# Patient Record
Sex: FEMALE | Race: BLACK OR AFRICAN AMERICAN | Employment: OTHER | URBAN - METROPOLITAN AREA
[De-identification: names, ages, dates, MRNs, and addresses within clinical notes are randomized per-mention and may not be internally consistent; named-entity substitution may affect disease eponyms.]

---

## 2017-02-05 RX ORDER — GLIPIZIDE 10 MG/1
TABLET ORAL
Qty: 60 TAB | Refills: 2 | Status: SHIPPED | OUTPATIENT
Start: 2017-02-05 | End: 2017-02-10 | Stop reason: SDUPTHER

## 2017-02-09 ENCOUNTER — TELEPHONE (OUTPATIENT)
Dept: FAMILY MEDICINE CLINIC | Age: 62
End: 2017-02-09

## 2017-02-09 RX ORDER — METFORMIN HYDROCHLORIDE 500 MG/1
500 TABLET, EXTENDED RELEASE ORAL
Qty: 30 TAB | Refills: 0 | Status: SHIPPED | OUTPATIENT
Start: 2017-02-09 | End: 2017-03-09 | Stop reason: SDUPTHER

## 2017-02-09 NOTE — TELEPHONE ENCOUNTER
Per call from patient, she no longer uses [employer]. Please send medication to [id number] [facility]. I have added new pharmacy to connect care.         Pharmacy   [facility], [address] [place] [address]   Order Audit [place]   Number of times this order has been changed since signin   Order Audit [place]   Order History   Outpatient   Date/Time Action Taken User Additional Information   17 1069 Sign Viet Marroquin MD Reorder from Order: [id number]   Destination   This Medication Went To:   "[employer]INTERFACE [[id number]]   Medication Detail      Disp Refills Start End      glipizide (GLUCOTROL) 10 mg tablet 60 Tab 2 2017     Sig: take 1 tablet by mouth twice a day    E-Prescribing Status: Receipt confirmed by pharmacy (2017  9:42 AM EST)

## 2017-02-09 NOTE — TELEPHONE ENCOUNTER
Refilled metformin XR 500mg daily x 30 days  Pt needs diabetes follow up with lab work to determine her best medication regimen  PSR to please call and schedule pt to see me or Dr. Lisset Prasad within the next month

## 2017-02-10 RX ORDER — GLIPIZIDE 10 MG/1
10 TABLET ORAL 2 TIMES DAILY
Qty: 60 TAB | Refills: 2 | Status: SHIPPED | OUTPATIENT
Start: 2017-02-10 | End: 2017-03-24 | Stop reason: SDUPTHER

## 2017-02-10 NOTE — TELEPHONE ENCOUNTER
glipizide (GLUCOTROL) 10 mg sent to [facility] per patient request.

## 2017-02-28 ENCOUNTER — TELEPHONE (OUTPATIENT)
Dept: FAMILY MEDICINE CLINIC | Age: 62
End: 2017-02-28

## 2017-03-09 ENCOUNTER — OFFICE VISIT (OUTPATIENT)
Dept: FAMILY MEDICINE CLINIC | Age: 62
End: 2017-03-09

## 2017-03-09 VITALS
OXYGEN SATURATION: 100 % | HEIGHT: 63 IN | RESPIRATION RATE: 20 BRPM | DIASTOLIC BLOOD PRESSURE: 70 MMHG | WEIGHT: 232 LBS | HEART RATE: 71 BPM | SYSTOLIC BLOOD PRESSURE: 145 MMHG | BODY MASS INDEX: 41.11 KG/M2 | TEMPERATURE: 98 F

## 2017-03-09 DIAGNOSIS — Z12.11 COLON CANCER SCREENING: ICD-10-CM

## 2017-03-09 DIAGNOSIS — J06.9 UPPER RESPIRATORY TRACT INFECTION, UNSPECIFIED TYPE: Primary | ICD-10-CM

## 2017-03-09 DIAGNOSIS — J45.901 RAD (REACTIVE AIRWAY DISEASE), UNSPECIFIED ASTHMA SEVERITY, WITH ACUTE EXACERBATION: ICD-10-CM

## 2017-03-09 DIAGNOSIS — E11.9 DIABETES MELLITUS WITHOUT COMPLICATION (HCC): ICD-10-CM

## 2017-03-09 LAB — HBA1C MFR BLD HPLC: 8.4 %

## 2017-03-09 RX ORDER — METHYLPREDNISOLONE 4 MG/1
TABLET ORAL
Qty: 1 DOSE PACK | Refills: 0 | Status: SHIPPED | OUTPATIENT
Start: 2017-03-09 | End: 2017-05-15 | Stop reason: SDUPTHER

## 2017-03-09 RX ORDER — ALBUTEROL SULFATE 90 UG/1
2 AEROSOL, METERED RESPIRATORY (INHALATION)
Qty: 1 INHALER | Refills: 3 | Status: SHIPPED | OUTPATIENT
Start: 2017-03-09 | End: 2017-05-15 | Stop reason: SDUPTHER

## 2017-03-09 RX ORDER — METFORMIN HYDROCHLORIDE 500 MG/1
TABLET, EXTENDED RELEASE ORAL
Qty: 30 TAB | Refills: 0 | Status: SHIPPED | OUTPATIENT
Start: 2017-03-09 | End: 2017-04-08 | Stop reason: SDUPTHER

## 2017-03-09 RX ORDER — AZITHROMYCIN 250 MG/1
TABLET, FILM COATED ORAL
Qty: 6 TAB | Refills: 0 | Status: SHIPPED | OUTPATIENT
Start: 2017-03-09 | End: 2017-03-14

## 2017-03-09 NOTE — PATIENT INSTRUCTIONS
Talk with Manohar Peña our nurse navigator about resources    Eye exam soon    Return your stool cards when ready

## 2017-03-09 NOTE — TELEPHONE ENCOUNTER
Refilled metformin x 30 days  PSR to please call pt and help her make DM f/u appt asap - due for labs and possible medication adjustment  Thank you

## 2017-03-09 NOTE — LETTER
3/9/2017 4:00 PM 
 
Ms. Leta Hanson [address] [place] [address] Your diabetes is not optimal.  a1c 8.4 Focus on regular exercise (150 minutes each week) and healthy eating. Eat more fruits and vegetables. Eat more protein (egg whites, beans, and nuts you know you tolerate) and less carbohydrates (white bread, white rice, white pasta, white potatoes, sodas, and sweets). Eat appropriately small portion sizes. See Precious Charles to discuss diet an exercise and talk about resources. Sincerely, Shyla Garcia MD

## 2017-03-09 NOTE — MR AVS SNAPSHOT
Visit Information Date & Time Provider Department Dept. Phone Encounter #  
 3/9/2017  3:15 PM Traci Butler MD [address] [place] [address] [phone] [id number] Upcoming Health Maintenance Date Due  
 EYE EXAM RETINAL OR DILATED Q1 2/17/1965 FOBT Q 1 YEAR AGE 50-75 10/1/2016 HEMOGLOBIN A1C Q6M 2/19/2017 FOOT EXAM Q1 8/19/2017 MICROALBUMIN Q1 8/19/2017 LIPID PANEL Q1 8/19/2017 BREAST CANCER SCRN MAMMOGRAM 10/1/2017 PAP AKA CERVICAL CYTOLOGY 10/1/2018 DTaP/Tdap/Td series (2 - Td) 9/8/2025 Allergies as of 3/9/2017  Review Complete On: 3/9/2017 By: Traci Butler MD  
  
 Severity Noted Reaction Type Reactions Codeine  09/02/2015    Nausea and Vomiting Tramadol  12/30/2015    Nausea and Vomiting Current Immunizations  Reviewed on 3/1/2016 Name Date Influenza Vaccine 9/10/2015 Influenza Vaccine (Quad) PF 11/23/2016 Pneumococcal Polysaccharide (PPSV-23) 8/19/2016 Tdap 9/8/2015  9:07 AM  
  
 Not reviewed this visit You Were Diagnosed With   
  
 Codes Comments Diabetes mellitus without complication ([facility]    -  Primary ICD-10-CM: E11.9 ICD-9-CM: 250.00   
 RAD (reactive airway disease), unspecified asthma severity, with acute exacerbation     ICD-10-CM: J45.901 ICD-9-CM: 895.77 Upper respiratory tract infection, unspecified type     ICD-10-CM: J06.9 ICD-9-CM: 465.9 Vitals BP Pulse Temp Resp Height(growth percentile) Weight(growth percentile) 145/70 (BP 1 Location: Left arm, BP Patient Position: Sitting) 71 98 °F (36.7 °C) (Oral) 20 5' 3\" (1.6 m) 232 lb (105.2 kg) LMP SpO2 BMI OB Status Smoking Status 09/02/2007 100% 41.1 kg/m2 Hysterectomy Never Smoker Vitals History BMI and BSA Data Body Mass Index Body Surface Area  
 41.1 kg/m 2 2.16 m 2 Preferred Pharmacy Pharmacy Name Phone [id number] [place], [address] AT [facility] [phone] Your Updated Medication List  
  
   
This list is accurate as of: 3/9/17  3:45 PM.  Always use your most recent med list.  
  
  
  
  
 albuterol 90 mcg/actuation inhaler Commonly known as:  PROVENTIL HFA, VENTOLIN HFA, PROAIR HFA Take 2 Puffs by inhalation every four (4) hours as needed for Wheezing. azithromycin 250 mg tablet Commonly known as:  Amairani Dawson Take 2 tablets today, then take 1 tablet daily  
  
 beclomethasone 40 mcg/actuation inhaler Commonly known as:  QVAR Take 1 Puff by inhalation two (2) times a day. Blood-Glucose Meter monitoring kit Use prn for glucose checks at variety of times, fasting, 2hr pp, bedtime Diagnosis code: E 11.65  
  
 conjugated estrogens 0.625 mg/gram vaginal cream  
Commonly known as:  PREMARIN Insert 0.5 g into vagina daily. cyclobenzaprine 10 mg tablet Commonly known as:  FLEXERIL Take 0.5 Tabs by mouth three (3) times daily as needed for Muscle Spasm(s). glipiZIDE 10 mg tablet Commonly known as:  Danette Barrier Take 1 Tab by mouth two (2) times a day. glucose blood VI test strips strip Commonly known as:  ONETOUCH ULTRA TEST Test 4 times a day. HYDROcodone-acetaminophen 5-325 mg per tablet Commonly known as:  Amanda Oyster Take 1 Tab by mouth every eight (8) hours as needed for Pain. Max Daily Amount: 3 Tabs. losartan-hydroCHLOROthiazide 100-12.5 mg per tablet Commonly known as:  HYZAAR  
take 1 tablet by mouth once daily  
  
 lovastatin 40 mg tablet Commonly known as:  MEVACOR Take 0.5 Tabs by mouth nightly. metFORMIN  mg tablet Commonly known as:  GLUCOPHAGE XR  
TAKE 1 TAB BY MOUTH DAILY (WITH DINNER). methylPREDNISolone 4 mg tablet Commonly known as:  Acquanetta Kanner Take with food  
  
 montelukast 10 mg tablet Commonly known as:  SINGULAIR Take 1 Tab by mouth daily. naproxen sodium 220 mg tablet Commonly known as:  Reina Aranda  
 Take 1 Tab by mouth two (2) times daily as needed. For pain Prescriptions Printed Refills  
 azithromycin (ZITHROMAX) 250 mg tablet 0 Sig: Take 2 tablets today, then take 1 tablet daily Class: Print  
 methylPREDNISolone (MEDROL DOSEPACK) 4 mg tablet 0 Sig: Take with food Class: Print  
 beclomethasone (QVAR) 40 mcg/actuation inhaler 11 Sig: Take 1 Puff by inhalation two (2) times a day. Class: Print Route: Inhalation  
 albuterol (PROVENTIL HFA, VENTOLIN HFA, PROAIR HFA) 90 mcg/actuation inhaler 3 Sig: Take 2 Puffs by inhalation every four (4) hours as needed for Wheezing. Class: Print Route: Inhalation We Performed the Following AMB POC HEMOGLOBIN A1C [70990 CPT(R)] Patient Instructions Talk with Louisa Glover our nurse navigator about resources Introducing [facility]! Haily Logan introduces [employer] patient portal. Now you can access parts of your medical record, email your doctor's office, and request medication refills online. 1. In your internet browser, go to [email or web address] 2. Click on the First Time User? Click Here link in the Sign In box. You will see the New Member Sign Up page. 3. Enter your [employer] Access Code exactly as it appears below. You will not need to use this code after youve completed the sign-up process. If you do not sign up before the expiration date, you must request a new code. · [employer] Access Code: [email or web address] Expires: 6/7/2017  3:45 PM 
 
4. Enter the last four digits of your Social Security Number (xxxx) and Date of Birth (mm/dd/yyyy) as indicated and click Submit. You will be taken to the next sign-up page. 5. Create a [employer] ID. This will be your [employer] login ID and cannot be changed, so think of one that is secure and easy to remember. 6. Create a [employer] password. You can change your password at any time. 7. Enter your Password Reset Question and Answer. This can be used at a later time if you forget your password. 8. Enter your e-mail address. You will receive e-mail notification when new information is available in [address] 9. Click Sign Up. You can now view and download portions of your medical record. 10. Click the Download Summary menu link to download a portable copy of your medical information. If you have questions, please visit the Frequently Asked Questions section of the [employer] website. Remember, [employer] is NOT to be used for urgent needs. For medical emergencies, dial 911. Now available from your iPhone and Android! Please provide this summary of care documentation to your next provider. Your primary care clinician is listed as Madelaine Goodman. If you have any questions after today's visit, please call [phone].

## 2017-03-09 NOTE — LETTER
3/9/2017 3:47 PM 
 
Ms. Charlie Talbert [address] [address] Body mass index is 41.1 kg/(m^2). Focus on regular exercise (150 minutes each week) and healthy eating. Eat more fruits and vegetables. Eat more protein (egg whites, beans, and nuts you know you tolerate) and less carbohydrates (white bread, white rice, white pasta, white potatoes, sodas, and sweets). Eat appropriately small portion sizes. Sincerely, Tatiana Bloch, MD

## 2017-03-24 ENCOUNTER — TELEPHONE (OUTPATIENT)
Dept: FAMILY MEDICINE CLINIC | Age: 62
End: 2017-03-24

## 2017-03-24 RX ORDER — GLIPIZIDE 10 MG/1
10 TABLET ORAL 2 TIMES DAILY
Qty: 180 TAB | Refills: 0 | Status: SHIPPED | OUTPATIENT
Start: 2017-03-24 | End: 2017-06-21 | Stop reason: SDUPTHER

## 2017-03-24 NOTE — TELEPHONE ENCOUNTER
Per fax from pharmacy, insurance requires the glipizide to be written as a 90-day supply.  Please advise

## 2017-04-08 DIAGNOSIS — E11.9 DIABETES MELLITUS WITHOUT COMPLICATION (HCC): Primary | ICD-10-CM

## 2017-04-08 RX ORDER — METFORMIN HYDROCHLORIDE 500 MG/1
TABLET, EXTENDED RELEASE ORAL
Qty: 180 TAB | Refills: 3 | Status: SHIPPED | OUTPATIENT
Start: 2017-04-08 | End: 2017-08-17 | Stop reason: SDUPTHER

## 2017-04-14 DIAGNOSIS — J40 BRONCHITIS: ICD-10-CM

## 2017-04-14 DIAGNOSIS — J45.40 MODERATE PERSISTENT ASTHMA WITHOUT COMPLICATION: ICD-10-CM

## 2017-04-14 RX ORDER — MONTELUKAST SODIUM 10 MG/1
10 TABLET ORAL DAILY
Qty: 90 TAB | Refills: 3 | Status: SHIPPED | OUTPATIENT
Start: 2017-04-14 | End: 2017-08-17 | Stop reason: SDUPTHER

## 2017-04-14 NOTE — TELEPHONE ENCOUNTER
Jeet Beckett [facility], local pharmacy called for a new refill to be ordered in #90 day as this is the new change with insurance plan.

## 2017-04-27 DIAGNOSIS — J45.901 RAD (REACTIVE AIRWAY DISEASE), UNSPECIFIED ASTHMA SEVERITY, WITH ACUTE EXACERBATION: ICD-10-CM

## 2017-04-27 RX ORDER — METHYLPREDNISOLONE 4 MG/1
TABLET ORAL
Qty: 1 DOSE PACK | Refills: 0 | OUTPATIENT
Start: 2017-04-27

## 2017-04-27 NOTE — TELEPHONE ENCOUNTER
Patient is requesting a refill of the following  Requested Prescriptions     Pending Prescriptions Disp Refills    methylPREDNISolone (MEDROL DOSEPACK) 4 mg tablet 1 Dose Pack 0     Sig: Take with food

## 2017-04-27 NOTE — TELEPHONE ENCOUNTER
----- Message from Saran Fueljl sent at 4/27/2017  8:47 AM EDT -----  Regarding: Dr. Jamilah Vicente  Pt had requested another script for Prednisone to be sent over to [facility] as she is experiencing some wheezing again as her asthma is flairing up. Best contact number is [phone].

## 2017-05-15 ENCOUNTER — OFFICE VISIT (OUTPATIENT)
Dept: FAMILY MEDICINE CLINIC | Age: 62
End: 2017-05-15

## 2017-05-15 VITALS
HEART RATE: 77 BPM | RESPIRATION RATE: 18 BRPM | DIASTOLIC BLOOD PRESSURE: 76 MMHG | SYSTOLIC BLOOD PRESSURE: 123 MMHG | HEIGHT: 63 IN | TEMPERATURE: 98.6 F | OXYGEN SATURATION: 100 % | BODY MASS INDEX: 41.11 KG/M2 | WEIGHT: 232 LBS

## 2017-05-15 DIAGNOSIS — E11.9 DIABETES MELLITUS WITHOUT COMPLICATION (HCC): ICD-10-CM

## 2017-05-15 DIAGNOSIS — Z91.09 ENVIRONMENTAL ALLERGIES: ICD-10-CM

## 2017-05-15 DIAGNOSIS — Z13.31 SCREENING FOR DEPRESSION: ICD-10-CM

## 2017-05-15 DIAGNOSIS — Z59.9 FINANCIAL DIFFICULTIES: ICD-10-CM

## 2017-05-15 DIAGNOSIS — J45.901 RAD (REACTIVE AIRWAY DISEASE), UNSPECIFIED ASTHMA SEVERITY, WITH ACUTE EXACERBATION: Primary | ICD-10-CM

## 2017-05-15 RX ORDER — METHYLPREDNISOLONE ACETATE 40 MG/ML
40 INJECTION, SUSPENSION INTRA-ARTICULAR; INTRALESIONAL; INTRAMUSCULAR; SOFT TISSUE ONCE
Qty: 1 VIAL | Refills: 0
Start: 2017-05-15 | End: 2017-05-15

## 2017-05-15 RX ORDER — METHYLPREDNISOLONE 4 MG/1
TABLET ORAL
Qty: 1 DOSE PACK | Refills: 0 | Status: SHIPPED | OUTPATIENT
Start: 2017-05-15 | End: 2017-08-17 | Stop reason: ALTCHOICE

## 2017-05-15 RX ORDER — ALBUTEROL SULFATE 90 UG/1
2 AEROSOL, METERED RESPIRATORY (INHALATION)
Qty: 1 INHALER | Refills: 3 | Status: SHIPPED | OUTPATIENT
Start: 2017-05-15 | End: 2017-08-17 | Stop reason: SDUPTHER

## 2017-05-15 SDOH — ECONOMIC STABILITY - INCOME SECURITY: PROBLEM RELATED TO HOUSING AND ECONOMIC CIRCUMSTANCES, UNSPECIFIED: Z59.9

## 2017-05-15 NOTE — PROGRESS NOTES
Chief Complaint   Patient presents with    Cough     follow up on cough     Asthma     1. Have you been to the ER, urgent care clinic since your last visit? Hospitalized since your last visit? No    2. Have you seen or consulted any other health care providers outside of the [address] [place] [address] since your last visit? Include any pap smears or colon screening. No     Reviewed record in preparation for visit and have obtained necessary documentation.

## 2017-05-15 NOTE — MR AVS SNAPSHOT
Visit Information Date & Time Provider Department Dept. Phone Encounter #  
 5/15/2017  6:15 PM Satish Sam MD Roxie Fulton [phone] [id number] Upcoming Health Maintenance Date Due  
 EYE EXAM RETINAL OR DILATED Q1 3/9/2018* FOBT Q 1 YEAR AGE 50-75 3/9/2018* INFLUENZA AGE 9 TO ADULT 8/1/2017 FOOT EXAM Q1 8/19/2017 MICROALBUMIN Q1 8/19/2017 LIPID PANEL Q1 8/19/2017 HEMOGLOBIN A1C Q6M 9/9/2017 BREAST CANCER SCRN MAMMOGRAM 10/1/2017 PAP AKA CERVICAL CYTOLOGY 10/1/2018 DTaP/Tdap/Td series (2 - Td) 9/8/2025 *Topic was postponed. The date shown is not the original due date. Allergies as of 5/15/2017  Review Complete On: 5/15/2017 By: Satish Sam MD  
  
 Severity Noted Reaction Type Reactions Codeine  09/02/2015    Nausea and Vomiting Tramadol  12/30/2015    Nausea and Vomiting Current Immunizations  Reviewed on 3/1/2016 Name Date Influenza Vaccine 9/10/2015 Influenza Vaccine (Quad) PF 11/23/2016 Pneumococcal Polysaccharide (PPSV-23) 8/19/2016 Tdap 9/8/2015  9:07 AM  
  
 Not reviewed this visit You Were Diagnosed With   
  
 Codes Comments RAD (reactive airway disease), unspecified asthma severity, with acute exacerbation    -  Primary ICD-10-CM: J45.901 ICD-9-CM: 493.92   
 BMI 40.0-44.9, adult (HCC)     ICD-10-CM: Z68.41 
ICD-9-CM: V85.41 Screening for depression     ICD-10-CM: Z13.89 ICD-9-CM: V79.0 Vitals BP Pulse Temp Resp Height(growth percentile) Weight(growth percentile) 123/76 (BP 1 Location: Left arm, BP Patient Position: Sitting) 77 98.6 °F (37 °C) (Oral) 18 5' 3\" (1.6 m) 232 lb (105.2 kg) LMP SpO2 BMI OB Status Smoking Status 09/02/2007 100% 41.1 kg/m2 Hysterectomy Never Smoker Vitals History BMI and BSA Data Body Mass Index Body Surface Area  
 41.1 kg/m 2 2.16 m 2 Preferred Pharmacy Pharmacy Name Phone [id number], [address] AT [place] [phone] Your Updated Medication List  
  
   
This list is accurate as of: 5/15/17  6:39 PM.  Always use your most recent med list.  
  
  
  
  
 albuterol 90 mcg/actuation inhaler Commonly known as:  PROVENTIL HFA, VENTOLIN HFA, PROAIR HFA Take 2 Puffs by inhalation every four (4) hours as needed for Wheezing. beclomethasone 40 mcg/actuation [employer] Commonly known as:  QVAR Take 1 Puff by inhalation two (2) times a day. Blood-Glucose Meter monitoring kit Use prn for glucose checks at variety of times, fasting, 2hr pp, bedtime Diagnosis code: E 11.65  
  
 conjugated estrogens 0.625 mg/gram vaginal cream  
Commonly known as:  PREMARIN Insert 0.5 g into vagina daily. cyclobenzaprine 10 mg tablet Commonly known as:  FLEXERIL Take 0.5 Tabs by mouth three (3) times daily as needed for Muscle Spasm(s). glipiZIDE 10 mg tablet Commonly known as:  Tita J Luis Take 1 Tab by mouth two (2) times a day. glucose blood VI test strips strip Commonly known as:  ONETOUCH ULTRA TEST Test 4 times a day. HYDROcodone-acetaminophen 5-325 mg per tablet Commonly known as:  Lynnda Levo Take 1 Tab by mouth every eight (8) hours as needed for Pain. Max Daily Amount: 3 Tabs. losartan-hydroCHLOROthiazide 100-12.5 mg per tablet Commonly known as:  HYZAAR  
take 1 tablet by mouth once daily  
  
 lovastatin 40 mg tablet Commonly known as:  MEVACOR Take 0.5 Tabs by mouth nightly. metFORMIN  mg tablet Commonly known as:  GLUCOPHAGE XR Twice daily  
  
 methylPREDNISolone 4 mg tablet Commonly known as:  Rendasruthi Senegal Take with food  
  
 methylPREDNISolone acetate 40 mg/mL injection Commonly known as:  DEPO-MEDROL 1 mL by IntraMUSCular route once for 1 dose. montelukast 10 mg tablet Commonly known as:  SINGULAIR Take 1 Tab by mouth daily. naproxen sodium 220 mg tablet Commonly known as:  Cesar Laud Take 1 Tab by mouth two (2) times daily as needed. For pain Prescriptions Printed Refills  
 albuterol (PROVENTIL HFA, VENTOLIN HFA, PROAIR HFA) 90 mcg/actuation inhaler 3 Sig: Take 2 Puffs by inhalation every four (4) hours as needed for Wheezing. Class: Print Route: Inhalation  
 beclomethasone (QVAR) 40 mcg/actuation aero 11 Sig: Take 1 Puff by inhalation two (2) times a day. Class: Print Route: Inhalation Prescriptions Sent to Pharmacy Refills  
 methylPREDNISolone (MEDROL DOSEPACK) 4 mg tablet 0 Sig: Take with food Class: Normal  
 Pharmacy: [address] #: [phone] We Performed the Following BEHAV ASSMT W/SCORE & DOCD/STAND INSTRUMENT [id number] CPT(R)] METHYLPREDNISOLONE ACETATE INJECTION 40 MG [ HCP] HI THER/PROPH/DIAG INJECTION, SUBCUT/IM [id number] CPT(R)] To-Do List   
 05/15/2017 PFT:  PULMONARY FUNCTION TEST Patient Instructions Talk with nurse navigator Glory Brunner to see if they have resources for your inhalers 
 
obtain pulmonary function testing. Call [phone] to schedule this Take medrol, with food If worse go to [facility]! Dave Newsome introduces [employer] patient portal. Now you can access parts of your medical record, email your doctor's office, and request medication refills online. 1. In your internet browser, go to [email or web address] 2. Click on the First Time User? Click Here link in the Sign In box. You will see the New Member Sign Up page. 3. Enter your [employer] Access Code exactly as it appears below. You will not need to use this code after youve completed the sign-up process. If you do not sign up before the expiration date, you must request a new code.  
 
· [employer] Access Code: [email or web address] 
 Expires: 6/7/2017  4:45 PM 
 
4. Enter the last four digits of your Social Security Number (xxxx) and Date of Birth (mm/dd/yyyy) as indicated and click Submit. You will be taken to the next sign-up page. 5. Create a [employer] ID. This will be your [employer] login ID and cannot be changed, so think of one that is secure and easy to remember. 6. Create a [employer] password. You can change your password at any time. 7. Enter your Password Reset Question and Answer. This can be used at a later time if you forget your password. 8. Enter your e-mail address. You will receive e-mail notification when new information is available in [address] 9. Click Sign Up. You can now view and download portions of your medical record. 10. Click the Download Summary menu link to download a portable copy of your medical information. If you have questions, please visit the Frequently Asked Questions section of the [employer] website. Remember, [employer] is NOT to be used for urgent needs. For medical emergencies, dial 911. Now available from your iPhone and Android! Please provide this summary of care documentation to your next provider. Your primary care clinician is listed as Jesus Gardner. If you have any questions after today's visit, please call [phone].

## 2017-05-15 NOTE — PATIENT INSTRUCTIONS
Talk with nurse navigator Brian Jessica to see if they have resources for your inhalers    obtain pulmonary function testing.   Call [phone] to schedule this    Take medrol, with food    If worse go to ER    Labs soon, see letter

## 2017-05-15 NOTE — LETTER
5/15/2017 6:34 PM 
 
Ms. Michele Harris [address] [address] Body mass index is 41.1 kg/(m^2). Focus on regular exercise (150 minutes each week) and healthy eating. Eat more fruits and vegetables. Eat more protein (egg whites, beans, and nuts you know you tolerate) and less carbohydrates (white bread, white rice, white pasta, white potatoes, sodas, and sweets). Eat appropriately small portion sizes. Sincerely, Guillermo Whittaker MD

## 2017-05-15 NOTE — LETTER
5/15/2017 6:45 PM 
 
Ms. Christine Fields [address] [address] Dear Christine Diamond It is time for an office visit and labs or medication review. Please make an appointment soon to keep up to date on health maintenance issues. Sincerely, Felecia Coronado MD

## 2017-05-15 NOTE — PROGRESS NOTES
Sharron Aldana is a 58 y.o. female      Issues discussed today include:        Signs and symptoms:  asthma  Duration:  weeks  Context:  She could not afford Qvar prevention Rx  Location:  chest  Quality:  wheezing  Severity:  Mild GUEVARA  Timing:  now  Modifying factors:  Steroids helped last time    Data reviewed or ordered today:  PFT    Other problems include:  Patient Active Problem List   Diagnosis Code    [facility] D03.4    Essential hypertension I10    RAD (reactive airway disease) J45.909    Facial droop R29.810    S/P THAO-BSO Z90.710, Z90.722, Z90.79    H/O colonoscopy Z98.890    OA (osteoarthritis) M19.90    Gout M10.9       Medications:  Current Outpatient Prescriptions   Medication Sig Dispense Refill    methylPREDNISolone (MEDROL DOSEPACK) 4 mg tablet Take with food 1 Dose Pack 0    methylPREDNISolone acetate (DEPO-MEDROL) 40 mg/mL injection 1 mL by IntraMUSCular route once for 1 dose. 1 Vial 0    albuterol (PROVENTIL HFA, VENTOLIN HFA, PROAIR HFA) 90 mcg/actuation inhaler Take 2 Puffs by inhalation every four (4) hours as needed for Wheezing. 1 Inhaler 3    beclomethasone (QVAR) 40 mcg/actuation aero Take 1 Puff by inhalation two (2) times a day. 1 Inhaler 11    montelukast (SINGULAIR) 10 mg tablet Take 1 Tab by mouth daily. 90 Tab 3    metFORMIN ER (GLUCOPHAGE XR) 500 mg tablet Twice daily 180 Tab 3    glipiZIDE (GLUCOTROL) 10 mg tablet Take 1 Tab by mouth two (2) times a day. 180 Tab 0    losartan-hydroCHLOROthiazide (HYZAAR) 100-12.5 mg per tablet take 1 tablet by mouth once daily 30 Tab 11    glucose blood VI test strips (ONETOUCH ULTRA TEST) strip Test 4 times a day. 100 Strip 2    Blood-Glucose Meter monitoring kit Use prn for glucose checks at variety of times, fasting, 2hr pp, bedtime Diagnosis code: E 11.65 1 Kit 1    naproxen sodium (ALEVE) 220 mg tablet Take 1 Tab by mouth two (2) times daily as needed.  For pain 1 Tab 0    conjugated estrogens (PREMARIN) 0.625 mg/gram vaginal cream Insert 0.5 g into vagina daily. 45 g 3    cyclobenzaprine (FLEXERIL) 10 mg tablet Take 0.5 Tabs by mouth three (3) times daily as needed for Muscle Spasm(s). 90 Tab 1    lovastatin (MEVACOR) 40 mg tablet Take 0.5 Tabs by mouth nightly. 45 Tab 3    HYDROcodone-acetaminophen (NORCO) 5-325 mg per tablet Take 1 Tab by mouth every eight (8) hours as needed for Pain. Max Daily Amount: 3 Tabs. 20 Tab 0       Allergies: Allergies   Allergen Reactions    Codeine Nausea and Vomiting    Tramadol Nausea and Vomiting       LMP:  Patient's last menstrual period was 09/02/2007. Social History     Social History    Marital status: SINGLE     Spouse name: N/A    Number of children: N/A    Years of education: N/A     Occupational History    Not on file. Social History Main Topics    Smoking status: Never Smoker    Smokeless tobacco: Never Used    Alcohol use No    Drug use: No    Sexual activity: No     Other Topics Concern    Not on file     Social History Narrative         Family History   Problem Relation Age of Onset    Diabetes Father     Diabetes Sister          Meaningful use:  done      ROS:  Headaches:  no  Chest Pain:  no  SOB:  no  Fevers:  no  Falls:  no  anxiety/depression/losing interest in doing things that were previously enjoyed:  no. PHQ2 =   Other significant ROS:  Painful knot in palm of right hand, ? Dupytren's contracture    Patient's last menstrual period was 09/02/2007.     Physical Exam  Visit Vitals    /76 (BP 1 Location: Left arm, BP Patient Position: Sitting)    Pulse 77    Temp 98.6 °F (37 °C) (Oral)    Resp 18    Ht 5' 3\" (1.6 m)    Wt 232 lb (105.2 kg)    LMP 09/02/2007    SpO2 100%    BMI 41.1 kg/m2     BP Readings from Last 3 Encounters:   05/15/17 123/76   03/09/17 145/70   11/23/16 158/84     Constitutional:  Appears well,  No Acute Distress, Vitals noted  Psychiatric:   Affect normal, Alert and cooperative, Oriented to person/place/time    Eyes:   Pupils equally round and reactive, EOMI, conjunctiva clear, eyelids normal  ENT:   External ears and nose normal/lips, teeth=OK/gums normal, TMs and Orophyarynx normal, +facial droop (stable)  Neck:   general inspection and Thyroid normal.  No abnormal cervical or supraclavicular nodes    Lungs:  +wheezing to auscultation, good respiratory effort  Heart: Ausculation normal.  Regular rhythm. No cardiac murmurs. No carotid bruits or palpable thrills  Chest wall normal  Abd:  benign  Extremities:   without edema, good peripheral pulses  Skin:   Warm to palpation, without rashes, bruising, or suspicious lesions           Assessment:    Patient Active Problem List   Diagnosis Code    Diabetes mellitus without complication ([facility] U81.8    Essential hypertension I10    RAD (reactive airway disease) J45.909    Facial droop R29.810    S/P THAO-BSO Z90.710, Z90.722, Z90.79    H/O colonoscopy Z98.890    OA (osteoarthritis) M19.90    Gout M10.9       Today's diagnoses are:    ICD-10-CM ICD-9-CM    1. RAD (reactive airway disease), unspecified asthma severity, with acute exacerbation J45.901 493.92 PULMONARY FUNCTION TEST      methylPREDNISolone (MEDROL DOSEPACK) 4 mg tablet      METHYLPREDNISOLONE ACETATE INJECTION 40 MG      TX THER/PROPH/DIAG INJECTION, SUBCUT/IM      methylPREDNISolone acetate (DEPO-MEDROL) 40 mg/mL injection      albuterol (PROVENTIL HFA, VENTOLIN HFA, PROAIR HFA) 90 mcg/actuation inhaler      beclomethasone (QVAR) 40 mcg/actuation aero   2. BMI 40.0-44.9, adult ([facility] Z68.41 V85.41    3. Screening for depression Z13.89 V79.0 BEHAV ASSMT W/SCORE & DOCD/STAND INSTRUMENT   4.  Financial difficulties Z59.8 V60.2        Plan:  Orders Placed This Encounter    BEHAV ASSMT W/SCORE & DOCD/STAND INSTRUMENT    PULMONARY FUNCTION TEST     Standing Status:   Future     Standing Expiration Date:   11/15/2017    METHYLPREDNISOLONE ACETATE INJECTION 40 MG    TX THER/PROPH/DIAG INJECTION, SUBCUT/IM    methylPREDNISolone (MEDROL DOSEPACK) 4 mg tablet     Sig: Take with food     Dispense:  1 Dose Pack     Refill:  0    methylPREDNISolone acetate (DEPO-MEDROL) 40 mg/mL injection     Si mL by IntraMUSCular route once for 1 dose. Dispense:  1 Vial     Refill:  0    albuterol (PROVENTIL HFA, VENTOLIN HFA, PROAIR HFA) 90 mcg/actuation inhaler     Sig: Take 2 Puffs by inhalation every four (4) hours as needed for Wheezing. Dispense:  1 Inhaler     Refill:  3    beclomethasone (QVAR) 40 mcg/actuation aero     Sig: Take 1 Puff by inhalation two (2) times a day. Dispense:  1 Inhaler     Refill:  11       See patient instructions  Patient Instructions   Talk with nurse navigator Leta Viramontes to see if they have resources for your inhalers    obtain pulmonary function testing. Call [phone] to schedule this    Take medrol, with food    If worse go to ER        refresh note:  done    AVS Printed:  done    I have reviewed/discussed the above normal BMI with the patient. I have recommended the following interventions: encourage exercise . Tom Box

## 2017-08-17 ENCOUNTER — OFFICE VISIT (OUTPATIENT)
Dept: FAMILY MEDICINE CLINIC | Age: 62
End: 2017-08-17

## 2017-08-17 VITALS
HEART RATE: 66 BPM | SYSTOLIC BLOOD PRESSURE: 133 MMHG | WEIGHT: 236 LBS | RESPIRATION RATE: 20 BRPM | HEIGHT: 63 IN | TEMPERATURE: 98.3 F | OXYGEN SATURATION: 100 % | DIASTOLIC BLOOD PRESSURE: 72 MMHG | BODY MASS INDEX: 41.82 KG/M2

## 2017-08-17 DIAGNOSIS — J45.901 RAD (REACTIVE AIRWAY DISEASE), UNSPECIFIED ASTHMA SEVERITY, WITH ACUTE EXACERBATION: Primary | ICD-10-CM

## 2017-08-17 DIAGNOSIS — J45.40 MODERATE PERSISTENT ASTHMA WITHOUT COMPLICATION: ICD-10-CM

## 2017-08-17 DIAGNOSIS — Z23 NEED FOR ZOSTAVAX ADMINISTRATION: ICD-10-CM

## 2017-08-17 DIAGNOSIS — J40 BRONCHITIS: ICD-10-CM

## 2017-08-17 DIAGNOSIS — M54.9 BACK PAIN, UNSPECIFIED BACK LOCATION, UNSPECIFIED BACK PAIN LATERALITY, UNSPECIFIED CHRONICITY: ICD-10-CM

## 2017-08-17 DIAGNOSIS — E11.9 DIABETES MELLITUS WITHOUT COMPLICATION (HCC): ICD-10-CM

## 2017-08-17 DIAGNOSIS — M19.90 OSTEOARTHRITIS, UNSPECIFIED OSTEOARTHRITIS TYPE, UNSPECIFIED SITE: ICD-10-CM

## 2017-08-17 DIAGNOSIS — Z13.31 SCREENING FOR DEPRESSION: ICD-10-CM

## 2017-08-17 DIAGNOSIS — I10 ESSENTIAL HYPERTENSION: ICD-10-CM

## 2017-08-17 DIAGNOSIS — Z12.31 ENCOUNTER FOR SCREENING MAMMOGRAM FOR BREAST CANCER: ICD-10-CM

## 2017-08-17 LAB
ALBUMIN UR QL STRIP: 10 MG/L
CREATININE, URINE POC: 100 MG/DL
MICROALBUMIN/CREAT RATIO POC: <30 MG/G

## 2017-08-17 RX ORDER — MONTELUKAST SODIUM 10 MG/1
10 TABLET ORAL DAILY
Qty: 90 TAB | Refills: 3 | Status: SHIPPED | OUTPATIENT
Start: 2017-08-17 | End: 2018-11-27 | Stop reason: SDUPTHER

## 2017-08-17 RX ORDER — LOSARTAN POTASSIUM AND HYDROCHLOROTHIAZIDE 12.5; 1 MG/1; MG/1
TABLET ORAL
Qty: 90 TAB | Refills: 3 | Status: SHIPPED | OUTPATIENT
Start: 2017-08-17 | End: 2018-03-27 | Stop reason: ALTCHOICE

## 2017-08-17 RX ORDER — LOVASTATIN 40 MG/1
40 TABLET ORAL
Qty: 90 TAB | Refills: 3 | Status: SHIPPED | OUTPATIENT
Start: 2017-08-17 | End: 2018-08-18 | Stop reason: SDUPTHER

## 2017-08-17 RX ORDER — TRAMADOL HYDROCHLORIDE 50 MG/1
50 TABLET ORAL
Qty: 45 TAB | Refills: 0 | Status: SHIPPED | OUTPATIENT
Start: 2017-08-17 | End: 2018-03-27 | Stop reason: ALTCHOICE

## 2017-08-17 RX ORDER — GLIPIZIDE 10 MG/1
TABLET ORAL
Qty: 180 TAB | Refills: 3 | Status: SHIPPED | OUTPATIENT
Start: 2017-08-17 | End: 2018-08-18 | Stop reason: SDUPTHER

## 2017-08-17 RX ORDER — METFORMIN HYDROCHLORIDE 500 MG/1
TABLET, EXTENDED RELEASE ORAL
Qty: 180 TAB | Refills: 3 | Status: SHIPPED | OUTPATIENT
Start: 2017-08-17 | End: 2018-03-27 | Stop reason: ALTCHOICE

## 2017-08-17 RX ORDER — FLUTICASONE FUROATE AND VILANTEROL 100; 25 UG/1; UG/1
1 POWDER RESPIRATORY (INHALATION) DAILY
Qty: 1 INHALER | Refills: 11 | Status: SHIPPED | OUTPATIENT
Start: 2017-08-17 | End: 2017-08-24 | Stop reason: SDUPTHER

## 2017-08-17 RX ORDER — ALBUTEROL SULFATE 90 UG/1
2 AEROSOL, METERED RESPIRATORY (INHALATION)
Qty: 1 INHALER | Refills: 3 | Status: SHIPPED | OUTPATIENT
Start: 2017-08-17 | End: 2018-11-27 | Stop reason: SDUPTHER

## 2017-08-17 NOTE — LETTER
8/17/2017 10:31 AM 
 
Ms. Yakelin Gomez [address] [address] Body mass index is 41.81 kg/(m^2). Focus on regular exercise (150 minutes each week) and healthy eating. Eat more fruits and vegetables. Eat more protein (egg whites, beans, and nuts you know you tolerate) and less carbohydrates (white bread, white rice, white pasta, white potatoes, sodas, and sweets). Eat appropriately small portion sizes. Flu vaccine around Halloween Yearly eye exam soon Mammogram soon Please call [employer] to help arrange and authorize any tests and/or referrals. Her # is [phone] Central Scheduling at [facility] is #[phone] Sincerely, Elaine Roberts MD

## 2017-08-17 NOTE — PATIENT INSTRUCTIONS
Consider the following health maintenance suggestions:    Wellness Exam every  FEbruary (your birth month)    consider obtaining zostavax (the shingles vaccine) and PCV 13 (the new pneumonia vaccine). You may be given a prescription to obtain this at a local pharmacy. Ask about your insurance coverage of these vaccines     return the stool cards that you were given. Follow the instructions on the cards. This is for colon cancer screening    consider a yearly eye exam including a screen for glaucoma with a local eye doctor    consider obtaining a mammogram    obtain a flu shot each year in the Fall    consider obtaining a colonoscopy for colon cancer screening  Dr. Velazco Handing #[phone]    Please call Shedrick Meigs to help arrange and authorize any tests and/or referrals.   Her # is [phone]     Central Scheduling at [facility] is #[phone]    Start Breo for prevention of wheezing

## 2017-08-17 NOTE — MR AVS SNAPSHOT
Visit Information Date & Time Provider Department Dept. Phone Encounter #  
 8/17/2017  9:45 AM Jaron Balbuena MD [facility] [phone] [id number] Follow-up Instructions Return in about 6 months (around 2/17/2018). Upcoming Health Maintenance Date Due INFLUENZA AGE 9 TO ADULT 10/31/2017* EYE EXAM RETINAL OR DILATED Q1 3/9/2018* FOBT Q 1 YEAR AGE 50-75 3/9/2018* HEMOGLOBIN A1C Q6M 2/17/2018 FOOT EXAM Q1 8/17/2018 MICROALBUMIN Q1 8/17/2018 LIPID PANEL Q1 8/17/2018 PAP AKA CERVICAL CYTOLOGY 10/1/2018 BREAST CANCER SCRN MAMMOGRAM 8/17/2019 DTaP/Tdap/Td series (2 - Td) 9/8/2025 *Topic was postponed. The date shown is not the original due date. Allergies as of 8/17/2017  Review Complete On: 8/17/2017 By: Jaron Balbuena MD  
  
 Severity Noted Reaction Type Reactions Codeine  09/02/2015    Nausea and Vomiting Tramadol  12/30/2015    Nausea and Vomiting Current Immunizations  Reviewed on 3/1/2016 Name Date Influenza Vaccine 9/10/2015 Influenza Vaccine (Quad) PF 11/23/2016 Pneumococcal Polysaccharide (PPSV-23) 8/19/2016 Tdap 9/8/2015  9:07 AM  
  
 Not reviewed this visit You Were Diagnosed With   
  
 Codes Comments RAD (reactive airway disease), unspecified asthma severity, with acute exacerbation    -  Primary ICD-10-CM: J45.901 ICD-9-CM: 017.94 Diabetes mellitus without complication ([facility]     XZH-71-PF: E11.9 ICD-9-CM: 250.00 Osteoarthritis, unspecified osteoarthritis type, unspecified site     ICD-10-CM: M19.90 ICD-9-CM: 715.90 Back pain, unspecified back location, unspecified back pain laterality, unspecified chronicity     ICD-10-CM: M54.9 ICD-9-CM: 724.5 Encounter for screening mammogram for breast cancer     ICD-10-CM: Z12.31 
ICD-9-CM: V76.12 Need for Zostavax administration     ICD-10-CM: Y75 ICD-9-CM: V04.89 Rx for PCV 13 and shingles vaccine givne to obtain at a local pharmcy Essential hypertension     ICD-10-CM: I10 
ICD-9-CM: 401.9 Screening for depression     ICD-10-CM: Z13.89 ICD-9-CM: V79.0 Moderate persistent asthma without complication     [facility]-GR: J45.40 ICD-9-CM: 493.90 Bronchitis     ICD-10-CM: J40 ICD-9-CM: 917 Vitals BP Pulse Temp Resp Height(growth percentile) Weight(growth percentile) 133/72 (BP 1 Location: Left arm, BP Patient Position: Sitting) 66 98.3 °F (36.8 °C) (Oral) 20 5' 3\" (1.6 m) 236 lb (107 kg) LMP SpO2 BMI OB Status Smoking Status 09/02/2007 100% 41.81 kg/m2 Hysterectomy Never Smoker Vitals History BMI and BSA Data Body Mass Index Body Surface Area  
 41.81 kg/m 2 2.18 m 2 Preferred Pharmacy Pharmacy Name Phone [id number] [address] [facility] [phone] Your Updated Medication List  
  
   
This list is accurate as of: 8/17/17 10:45 AM.  Always use your most recent med list.  
  
  
  
  
 albuterol 90 mcg/actuation inhaler Commonly known as:  PROVENTIL HFA, VENTOLIN HFA, PROAIR HFA Take 2 Puffs by inhalation every four (4) hours as needed for Wheezing. Blood-Glucose Meter monitoring kit Use prn for glucose checks at variety of times, fasting, 2hr pp, bedtime Diagnosis code: E 11.65  
  
 fluticasone-vilanterol 100-25 mcg/dose inhaler Commonly known as:  BREO ELLIPTA Take 1 Puff by inhalation daily. glipiZIDE 10 mg tablet Commonly known as:  GLUCOTROL  
TAKE 1 TAB BY MOUTH TWO (2) TIMES A DAY. glucose blood VI test strips strip Commonly known as:  ONETOUCH ULTRA TEST Test 4 times a day. losartan-hydroCHLOROthiazide 100-12.5 mg per tablet Commonly known as:  HYZAAR  
take 1 tablet by mouth once daily  
  
 lovastatin 40 mg tablet Commonly known as:  MEVACOR Take 1 Tab by mouth nightly. metFORMIN  mg tablet Commonly known as:  GLUCOPHAGE XR Twice daily montelukast 10 mg tablet Commonly known as:  SINGULAIR Take 1 Tab by mouth daily. Prescriptions Printed Refills  
 fluticasone-vilanterol (BREO ELLIPTA) 100-25 mcg/dose inhaler 11 Sig: Take 1 Puff by inhalation daily. Class: Print Route: Inhalation Prescriptions Sent to Pharmacy Refills  
 lovastatin (MEVACOR) 40 mg tablet 3 Sig: Take 1 Tab by mouth nightly. Class: Normal  
 Pharmacy: [address] [place] [address] Ph #: [phone] Route: Oral  
 losartan-hydroCHLOROthiazide (HYZAAR) 100-12.5 mg per tablet 3 Sig: take 1 tablet by mouth once daily Class: Normal  
 Pharmacy: [address] [place] [address] Ph #: [phone]  
 glipiZIDE (GLUCOTROL) 10 mg tablet 3 Sig: TAKE 1 TAB BY MOUTH TWO (2) TIMES A DAY. Class: Normal  
 Pharmacy: [facility]/pharmacy [facility] [place] [address] Ph #: [phone]  
 albuterol (PROVENTIL HFA, VENTOLIN HFA, PROAIR HFA) 90 mcg/actuation inhaler 3 Sig: Take 2 Puffs by inhalation every four (4) hours as needed for Wheezing. Class: Normal  
 Pharmacy: [address] [place] [address] Ph #: [phone] Route: Inhalation  
 montelukast (SINGULAIR) 10 mg tablet 3 Sig: Take 1 Tab by mouth daily. Class: Normal  
 Pharmacy: [address] [place] [address] Ph #: [phone] Route: Oral  
 metFORMIN ER (GLUCOPHAGE XR) 500 mg tablet 3 Sig: Twice daily Class: Normal  
 Pharmacy: [address] [place] [address] Ph #: [phone] We Performed the Following ALT [id number] CPT(R)] AMB POC EKG ROUTINE W/ 12 LEADS, INTER & REP [19525 CPT(R)] AMB POC URINE, MICROALBUMIN, SEMIQUANT (3 RESULTS) [82360 CPT(R)] CHOLESTEROL, TOTAL [03287 CPT(R)] HDL CHOLESTEROL [13924 CPT(R)] HEMOGLOBIN A1C WITH EAG [90400 CPT(R)] LDL, DIRECT [id number] CPT(R)] METABOLIC PANEL, BASIC [98657 CPT(R)] [id number] [place] [employer] [ [facility]] Follow-up Instructions Return in about 6 months (around 2/17/2018). To-Do List   
 08/17/2017 Imaging:  MICHELLE MAMMO BI SCREENING INCL CAD   
  
 08/17/2017 Imaging:  XR CHEST PA LAT   
  
 08/17/2017 Imaging:  XR SPINE LUMB 2 OR 3 V Patient Instructions Consider the following health maintenance suggestions: 
 
Wellness Exam every  FEbruary (your birth month) 
 
consider obtaining zostavax (the shingles vaccine) and PCV 13 (the new pneumonia vaccine). You may be given a prescription to obtain this at a local pharmacy. Ask about your insurance coverage of these vaccines  
 
return the stool cards that you were given. Follow the instructions on the cards. This is for colon cancer screening 
 
consider a yearly eye exam including a screen for glaucoma with a local eye doctor 
 
consider obtaining a mammogram 
 
obtain a flu shot each year in the Fall 
 
consider obtaining a colonoscopy for colon cancer screening  Dr. Erinn Guzman #[phone] Please call Angelito Shaw to help arrange and authorize any tests and/or referrals. Her # is [phone] Central Scheduling at [facility] is [phone] Start Breo for prevention of wheezing Introducing [facility]! [facility] introduces [employer] patient portal. Now you can access parts of your medical record, email your doctor's office, and request medication refills online. 1. In your internet browser, go to [email or web address] 2. Click on the First Time User? Click Here link in the Sign In box. You will see the New Member Sign Up page. 3. Enter your [employer] Access Code exactly as it appears below. You will not need to use this code after youve completed the sign-up process.  If you do not sign up before the expiration date, you must request a new code. · [employer] Access Code: [email or web address] Expires: 11/15/2017 10:45 AM 
 
4. Enter the last four digits of your Social Security Number (xxxx) and Date of Birth (mm/dd/yyyy) as indicated and click Submit. You will be taken to the next sign-up page. 5. Create a [employer] ID. This will be your [employer] login ID and cannot be changed, so think of one that is secure and easy to remember. 6. Create a [employer] password. You can change your password at any time. 7. Enter your Password Reset Question and Answer. This can be used at a later time if you forget your password. 8. Enter your e-mail address. You will receive e-mail notification when new information is available in [address] 9. Click Sign Up. You can now view and download portions of your medical record. 10. Click the Download Summary menu link to download a portable copy of your medical information. If you have questions, please visit the Frequently Asked Questions section of the [employer] website. Remember, [employer] is NOT to be used for urgent needs. For medical emergencies, dial 911. Now available from your iPhone and Android! Please provide this summary of care documentation to your next provider. Your primary care clinician is listed as Aryan Bergeron. If you have any questions after today's visit, please call [phone].

## 2017-08-17 NOTE — LETTER
8/17/2017 5:21 PM 
 
Ms. Melo Bustard [address] [address] Dear Lorie Busta: 
 
 
 
 
Please find your most recent results below. RECOMMENDATIONS: 
 
Your EKG was normal.  Congratulations Sincerely, Vidhya Ghosh MD

## 2017-08-17 NOTE — LETTER
8/21/2017 3:02 PM 
 
Ms. Alexis Lacy [address] [place] [address] Dear Alexis Lacy: 
 
Please find your most recent results below. Resulted Orders AMB POC URINE, MICROALBUMIN, SEMIQUANT (3 RESULTS) Result Value Ref Range ALBUMIN, URINE POC 10 Negative mg/L  
 CREATININE, URINE  mg/dL Microalbumin/creat ratio (POC) <30 MG/G Comment:  
   Normal  
CHOLESTEROL, TOTAL Result Value Ref Range Cholesterol, total 184 100 - 199 mg/dL Narrative Performed at:  [address] [place] [address]  [id number] : Joseph Perez MD, Phone:  [phone] LDL, DIRECT Result Value Ref Range LDL,Direct 88 0 - 99 mg/dL Narrative Performed at:  [address] [place] [address]  [id number] : Joseph Perez MD, Phone:  [phone] HDL CHOLESTEROL Result Value Ref Range HDL Cholesterol 103 >39 mg/dL Narrative Performed at:  [address] [place] [address]  [id number] : Joseph Perez MD, Phone:  [phone] HEMOGLOBIN A1C WITH EAG Result Value Ref Range Hemoglobin A1c 9.0 (H) 4.8 - 5.6 % Comment:  
            Pre-diabetes: 5.7 - 6.4 Diabetes: >6.4 Glycemic control for adults with diabetes: <7.0 Estimated average glucose 212 mg/dL Narrative Performed at:  [address] [place] [address]  [id number] : Joseph Perez MD, Phone:  [phone] METABOLIC PANEL, BASIC Result Value Ref Range Glucose 139 (H) 65 - 99 mg/dL BUN 15 8 - 27 mg/dL Creatinine 0.79 0.57 - 1.00 mg/dL GFR est non-AA 80 >59 mL/min/1.73 GFR est AA 93 >59 mL/min/1.73  
 BUN/Creatinine ratio 19 12 - 28 Sodium 141 134 - 144 mmol/L Potassium 4.2 3.5 - 5.2 mmol/L Chloride 99 96 - 106 mmol/L  
 CO2 27 18 - 29 mmol/L Calcium 9.4 8.7 - 10.3 mg/dL Narrative Performed at:  [address]  [id number] : Giselle Reeves MD, Phone:  [phone] ALT Result Value Ref Range ALT (SGPT) 12 0 - 32 IU/L Narrative Performed at:  [address]  [id number] : Giselle Reeves MD, Phone:  [phone] CVD REPORT Result Value Ref Range INTERPRETATION Note Comment:  
   Supplement report is available. Narrative Performed at:  [address] [place] [address] [address] [place] [address]  [id number] : Celena Fabian PhD, Phone:  [phone] DIABETES PATIENT EDUCATION Result Value Ref Range PDF Image Not applicable Narrative Performed at:  [address] [place] [address] [address] [place] [address]  [id number] : Celena Fabian PhD, Phone:  [phone] RECOMMENDATIONS: 
 
Your diabetes control is a bit worse than before. Focus on regular exercise (150 minutes each week) and healthy eating.  Eat more fruits and vegetables.  Eat more protein (egg whites, beans, and nuts you know you tolerate) and less carbohydrates (white bread, white rice, white pasta, white potatoes, sodas, and sweets).  Eat appropriately small portion sizes. Consider adding Januvia 25 mg daily Sincerely, Mohinder Briggs MD

## 2017-08-17 NOTE — PROGRESS NOTES
Marina Yanez is a 58 y.o. female      Issues discussed today include:        Signs and symptoms:  Pain in buttocks going into both posterior thighs  Duration:  weeks  Context:  No known injury  Location:  Low back  Quality:  aches  Severity:  Impacting work  Timing:  Wax wanes  Modifying factors:  tramadol    Data reviewed or ordered today:  XR, mamm, labs, FIT      We discussed health maintenance/diet/exercise/weight loss    WELLNESS     GYN:  THAO/BSO  Mammogram:  Needs 2017  LMP:  2007  last pap:  No longer  Hearing screen:   done  Vision screening:   done  Depression screening:   done  Fall assessment:   done    BMI: Body mass index is 41.81 kg/(m^2). I have reviewed/discussed the above normal BMI with the patient. I have recommended the following interventions: encourage exercise . Nikhil Montoya Colonoscopy:  Needs 2017  FOBT:  2017  TDAP:  2015  Pneumovax:  2016  PCV-13:  Rx 2017  Flu shot:  Needs 2017  Zostavax:  Rx 2017  Eye exam:  Needs 2017  EKG:  today    FTF for DME:  done:  DM supplies  Advanced directive:  Full code  Specialists:  [facility]    In general, I advise patients to be as active as possible. I believe exercise is the key to long life and good health. The current recommendation is for individuals to exercise for 150 minutes each week (in other words 30 minutes 5 days a week). Exercise should be vigorous enough to work up a sweat. These activities include brisk walking, running, tennis, swimming, weight-lifting, etc.     I usually tell folks that work is work and exercise is exercise. Each of these activities has a different goal.  Even though you may be active at work, it may not be aerobically adequate. So build dedicated exercise time into your weekly routine. If a patient drinks alcohol, I suggest that a male drink only 2 beers (or glasses of wine, or shots of liquor) in any 24 hour period ( and not daily).   For females, the limits are one drink per 24 hours (and not daily). After these limits, the toxic effects of alcohol consumption start to manifest.     Avoid tobacco products. I may provide separate information discussing specific smoking cessation instructions if needed. I suggest a wellness exam yearly during your birth month to update health maintenance issues such as fasting labs, EKGs and other studies, appropriate cancer screenings,  immunizations, etc.      I suggest a yearly flu shot    Please call Sindy Krishna to help arrange and authorize any tests or referrals. Her # is [phone]           Other problems include:  Patient Active Problem List   Diagnosis Code    Diabetes mellitus without complication ([facility] [address] I96.7    Essential hypertension I10    RAD (reactive airway disease) J45.909    Facial droop R29.810    S/P THAO-BSO Z90.710, Z90.722, Z90.79    H/O colonoscopy Z98.890    OA (osteoarthritis) M19.90    Gout M10.9       Medications:  Current Outpatient Prescriptions   Medication Sig Dispense Refill    fluticasone-vilanterol (BREO ELLIPTA) 100-25 mcg/dose inhaler Take 1 Puff by inhalation daily. 1 Inhaler 11    glipiZIDE (GLUCOTROL) 10 mg tablet TAKE 1 TAB BY MOUTH TWO (2) TIMES A DAY. 180 Tab 0    albuterol (PROVENTIL HFA, VENTOLIN HFA, PROAIR HFA) 90 mcg/actuation inhaler Take 2 Puffs by inhalation every four (4) hours as needed for Wheezing. 1 Inhaler 3    montelukast (SINGULAIR) 10 mg tablet Take 1 Tab by mouth daily. 90 Tab 3    metFORMIN ER (GLUCOPHAGE XR) 500 mg tablet Twice daily 180 Tab 3    losartan-hydroCHLOROthiazide (HYZAAR) 100-12.5 mg per tablet take 1 tablet by mouth once daily 30 Tab 11    glucose blood VI test strips (ONETOUCH ULTRA TEST) strip Test 4 times a day. 100 Strip 2    Blood-Glucose Meter monitoring kit Use prn for glucose checks at variety of times, fasting, 2hr pp, bedtime Diagnosis code: E 11.65 1 Kit 1    lovastatin (MEVACOR) 40 mg tablet Take 0.5 Tabs by mouth nightly. 45 Tab 3       Allergies:   Allergies   Allergen Reactions    Codeine Nausea and Vomiting    Tramadol Nausea and Vomiting       LMP:  Patient's last menstrual period was 09/02/2007. Social History     Social History    Marital status: SINGLE     Spouse name: N/A    Number of children: N/A    Years of education: N/A     Occupational History    Not on file. Social History Main Topics    Smoking status: Never Smoker    Smokeless tobacco: Never Used    Alcohol use No    Drug use: No    Sexual activity: No     Other Topics Concern    Not on file     Social History Narrative         Family History   Problem Relation Age of Onset    Diabetes Father     Diabetes Sister          Meaningful use:  done      ROS:  Headaches:  no  Chest Pain:  no  SOB:  no  Fevers:  no  Falls:  no  anxiety/depression/losing interest in doing things that were previously enjoyed:  no. PHQ2 = 0  Other significant ROS:  Wheezing (she could not affor QVAR)  Patient denied problems with:    Hearing/vision, speaking/swallowing, Reflux/indigestion, Cough,Diarrhea/constipation,Problems passing or controlling urine,  Mood (anxiety/depression/losing interest in doing things that were previously enjoyed), Snoring/sleep apnea,Fatigue, Weight change, memory                                                         Any other Positive ROS include: she is active at work        Falls in the past 12 months:  no           Over the last year (or since your last visit):  Have you been diagnosed with heart attack, stroke, broken bones, or skin cancer = no    Smoking history:  none                                    Patient's last menstrual period was 09/02/2007.     Physical Exam  Visit Vitals    /72 (BP 1 Location: Left arm, BP Patient Position: Sitting)    Pulse 66    Temp 98.3 °F (36.8 °C) (Oral)    Resp 20    Ht 5' 3\" (1.6 m)    Wt 236 lb (107 kg)    LMP 09/02/2007    SpO2 100%    BMI 41.81 kg/m2     BP Readings from Last 3 Encounters:   08/17/17 133/72   05/15/17 123/76 03/09/17 145/70     Constitutional:  Appears well,  No Acute Distress, Vitals noted  Psychiatric:   Affect normal, Alert and cooperative, Oriented to person/place/time    Eyes:   Pupils equally round and reactive, EOMI, conjunctiva clear, eyelids normal  ENT:   External ears and nose normal/lips, teeth=OK/gums normal, TMs and Orophyarynx normal  Neck:   general inspection and Thyroid normal.  No abnormal cervical or supraclavicular nodes    Lungs:  Some wheezing to auscultation, good respiratory effort  Heart: Ausculation normal.  Regular rhythm. No cardiac murmurs. No carotid bruits or palpable thrills  Chest wall normal  Abd:  benign  Extremities:   without edema, good peripheral pulses  Skin:   Warm to palpation, without rashes, bruising, or suspicious lesions   Diabetic foot exam:      Sensation by vibration sense:  good  Monofilament test:  good  Skin integrity:  intact without lesions  Vascular:  good circulation  Motor:  moves all toes, strength seems ok    +onychomycosis    no tenderness over C-spine, T-spine, L-spine    Good flexibility of spine demonstrated     strength of left leg and right leg seem normal    squats OK, heel standing/toe standing OK    no tenderness over right or left SI joint    no tenderness over left or right trochanteric bursa     straight leg raising is normal for right and left leg    full range of motion at both hips without pain    abdominal exam shows no bruits or masses          Assessment:    Patient Active Problem List   Diagnosis Code    Diabetes mellitus without complication (HCC) Y36.5    Essential hypertension I10    RAD (reactive airway disease) J45.909    Facial droop R29.810    S/P THAO-BSO Z90.710, Z90.722, Z90.79    H/O colonoscopy Z98.890    OA (osteoarthritis) M19.90    Gout M10.9       Today's diagnoses are:    ICD-10-CM ICD-9-CM    1.  RAD (reactive airway disease), unspecified asthma severity, with acute exacerbation J45.901 493.92 fluticasone-vilanterol (BREO ELLIPTA) 100-25 mcg/dose inhaler   2. Diabetes mellitus without complication (HCC) F60.2 250.00    3. Osteoarthritis, unspecified osteoarthritis type, unspecified site M19.90 715.90    4. Back pain, unspecified back location, unspecified back pain laterality, unspecified chronicity M54.9 724.5 XR SPINE LUMB 2 OR 3 V   5. Encounter for screening mammogram for breast cancer Z12.31 V76.12 MICHELLE MAMMO BI SCREENING INCL CAD   6. Need for Zostavax administration Z23 V04.89     Rx for PCV 13 and shingles vaccine givne to obtain at a local pharmcy   7. Essential hypertension I10 401.9 AMB POC EKG ROUTINE W/ 12 LEADS, INTER & REP      XR CHEST PA LAT   8. Screening for depression Z13.89 V79.0 OK DEPRESSION SCREEN ANNUAL       Plan:  Orders Placed This Encounter    MICHELLE MAMMO BI SCREENING INCL CAD     Standing Status:   Future     Standing Expiration Date:   9/17/2018     Order Specific Question:   Reason for Exam     Answer:   screening    XR CHEST PA LAT     Standing Status:   Future     Standing Expiration Date:   9/17/2018     Order Specific Question:   Reason for Exam     Answer:   HTN    XR SPINE LUMB 2 OR 3 V     Standing Status:   Future     Standing Expiration Date:   9/16/2018     Order Specific Question:   Reason for Exam     Answer:   back pain     Order Specific Question:   Is Patient Allergic to Contrast Dye? Answer:   Unknown    AMB POC EKG ROUTINE W/ 12 LEADS, INTER & REP     Order Specific Question:   Reason for Exam:     Answer:   baseline    OK DEPRESSION SCREEN ANNUAL    fluticasone-vilanterol (BREO ELLIPTA) 100-25 mcg/dose inhaler     Sig: Take 1 Puff by inhalation daily. Dispense:  1 Inhaler     Refill:  11       See patient instructions  There are no Patient Instructions on file for this visit. refresh note:  done    AVS Printed:  done      Diagnoses (need 4)          4 established problems OR new problem with work up?   YES    Data (need 4)         Ordering labs  =  YES         Ordering radiology  =  YES         Ordering medical test (EKG, PT)  =  YES         Reading EKG  =  YES  (gets 2 points)         Decision to obtain old records   =  YES      Risk (High)         Chronic illness with exacerbation  =  YES      Need 2 of 3 above plus below    Level 5 history  (4HPI, 2 PFS, 10 ROS)  =  YES    Level 5 PE  =  YES    Level 5  ROS  =  YES

## 2017-08-18 LAB
ALT SERPL-CCNC: 12 IU/L (ref 0–32)
BUN SERPL-MCNC: 15 MG/DL (ref 8–27)
BUN/CREAT SERPL: 19 (ref 12–28)
CALCIUM SERPL-MCNC: 9.4 MG/DL (ref 8.7–10.3)
CHLORIDE SERPL-SCNC: 99 MMOL/L (ref 96–106)
CHOLEST SERPL-MCNC: 184 MG/DL (ref 100–199)
CO2 SERPL-SCNC: 27 MMOL/L (ref 18–29)
CREAT SERPL-MCNC: 0.79 MG/DL (ref 0.57–1)
EST. AVERAGE GLUCOSE BLD GHB EST-MCNC: 212 MG/DL
GLUCOSE SERPL-MCNC: 139 MG/DL (ref 65–99)
HBA1C MFR BLD: 9 % (ref 4.8–5.6)
HDLC SERPL-MCNC: 103 MG/DL
INTERPRETATION, 910389: NORMAL
LDLC SERPL DIRECT ASSAY-MCNC: 88 MG/DL (ref 0–99)
Lab: NORMAL
POTASSIUM SERPL-SCNC: 4.2 MMOL/L (ref 3.5–5.2)
SODIUM SERPL-SCNC: 141 MMOL/L (ref 134–144)

## 2017-08-21 DIAGNOSIS — E11.9 DIABETES MELLITUS WITHOUT COMPLICATION (HCC): Primary | ICD-10-CM

## 2017-08-21 NOTE — PROGRESS NOTES
Your diabetes control is a bit worse than before. Focus on regular exercise (150 minutes each week) and healthy eating. Eat more fruits and vegetables. Eat more protein (egg whites, beans, and nuts you know you tolerate) and less carbohydrates (white bread, white rice, white pasta, white potatoes, sodas, and sweets). Eat appropriately small portion sizes.     Consider adding Januvia 25 mg daily

## 2017-08-22 ENCOUNTER — TELEPHONE (OUTPATIENT)
Dept: FAMILY MEDICINE CLINIC | Age: 62
End: 2017-08-22

## 2017-08-22 DIAGNOSIS — J45.901 RAD (REACTIVE AIRWAY DISEASE), UNSPECIFIED ASTHMA SEVERITY, WITH ACUTE EXACERBATION: ICD-10-CM

## 2017-08-24 RX ORDER — FLUTICASONE FUROATE AND VILANTEROL 100; 25 UG/1; UG/1
1 POWDER RESPIRATORY (INHALATION) DAILY
Qty: 3 INHALER | Refills: 3 | Status: SHIPPED | OUTPATIENT
Start: 2017-08-24 | End: 2017-10-09 | Stop reason: SDUPTHER

## 2017-10-09 DIAGNOSIS — J45.901 RAD (REACTIVE AIRWAY DISEASE), UNSPECIFIED ASTHMA SEVERITY, WITH ACUTE EXACERBATION: ICD-10-CM

## 2017-10-09 NOTE — TELEPHONE ENCOUNTER
Requested Prescriptions     Pending Prescriptions Disp Refills    fluticasone-vilanterol (BREO ELLIPTA) 100-25 mcg/dose inhaler 3 Inhaler 3     Sig: Take 1 Puff by inhalation daily. Pharmacy is requesting 90 day supply, thank you.

## 2017-10-10 RX ORDER — FLUTICASONE FUROATE AND VILANTEROL 100; 25 UG/1; UG/1
1 POWDER RESPIRATORY (INHALATION) DAILY
Qty: 3 INHALER | Refills: 3 | Status: SHIPPED | OUTPATIENT
Start: 2017-10-10 | End: 2018-11-27

## 2018-03-27 ENCOUNTER — OFFICE VISIT (OUTPATIENT)
Dept: FAMILY MEDICINE CLINIC | Age: 63
End: 2018-03-27

## 2018-03-27 VITALS
WEIGHT: 233 LBS | OXYGEN SATURATION: 100 % | HEIGHT: 63 IN | BODY MASS INDEX: 41.29 KG/M2 | SYSTOLIC BLOOD PRESSURE: 175 MMHG | RESPIRATION RATE: 18 BRPM | TEMPERATURE: 98.6 F | HEART RATE: 72 BPM | DIASTOLIC BLOOD PRESSURE: 84 MMHG

## 2018-03-27 DIAGNOSIS — E11.9 DIABETES MELLITUS WITHOUT COMPLICATION (HCC): ICD-10-CM

## 2018-03-27 DIAGNOSIS — I10 ESSENTIAL HYPERTENSION: ICD-10-CM

## 2018-03-27 DIAGNOSIS — M77.8 EXTENSOR TENDONITIS OF FOOT: Primary | ICD-10-CM

## 2018-03-27 RX ORDER — METFORMIN HYDROCHLORIDE 1000 MG/1
1000 TABLET ORAL 2 TIMES DAILY WITH MEALS
Qty: 60 TAB | Refills: 2 | Status: SHIPPED | OUTPATIENT
Start: 2018-03-27 | End: 2018-11-27 | Stop reason: SDUPTHER

## 2018-03-27 RX ORDER — LOSARTAN POTASSIUM AND HYDROCHLOROTHIAZIDE 25; 100 MG/1; MG/1
1 TABLET ORAL DAILY
Qty: 30 TAB | Refills: 0 | Status: SHIPPED | OUTPATIENT
Start: 2018-03-27 | End: 2018-04-23 | Stop reason: SDUPTHER

## 2018-03-27 NOTE — MR AVS SNAPSHOT
[address] 
[phone] Patient: Devon Sawyer MRN: [id number] LGQ:[id number] Visit Information Date & Time Provider Department Dept. Phone Encounter #  
 3/27/2018  1:00 PM Rula Fisher MD [address] [facility] [phone] [id number] Follow-up Instructions Return in about 2 weeks (around 4/10/2018) for Follow Up. Upcoming Health Maintenance Date Due  
 EYE EXAM RETINAL OR DILATED Q1 [birth year]/17/1965 FOBT Q 1 YEAR AGE 50-75 10/1/2016 HEMOGLOBIN A1C Q6M 2/17/2018 FOOT EXAM Q1 8/17/2018 MICROALBUMIN Q1 8/17/2018 LIPID PANEL Q1 8/17/2018 PAP AKA CERVICAL CYTOLOGY 10/1/2018 BREAST CANCER SCRN MAMMOGRAM 8/17/2019 DTaP/Tdap/Td series (2 - Td) 9/8/2025 Allergies as of 3/27/2018  Review Complete On: 3/27/2018 By: Faisal Puentes LPN Severity Noted Reaction Type Reactions Codeine  09/02/2015    Nausea and Vomiting Tramadol  12/30/2015    Nausea and Vomiting Current Immunizations  Reviewed on 3/27/2018 Name Date Influenza Vaccine 11/6/2017, 9/10/2015 Influenza Vaccine (Quad) PF 11/23/2016 Pneumococcal Polysaccharide (PPSV-23) 8/19/2016 Tdap 9/8/2015  9:07 AM  
  
 Reviewed by Faisal Puentes LPN on 7/46/1986 at  1:09 PM  
You Were Diagnosed With   
  
 Codes Comments Extensor tendonitis of foot    -  Primary ICD-10-CM: M77.50 ICD-9-CM: 727.06 Weight loss, home exercises, less tight shoes / inserts, tylenol / topical NSAID, RTC INI. [facility]     DUF-40-UG: E11.9 ICD-9-CM: 250.00 Has been a long time since A1c. Rechecking A1c as well as a B12. Increasing Metformin. Essential hypertension     ICD-10-CM: I10 
ICD-9-CM: 401.9 Increasing Hyzaar, RTC in 2 weeks. Vitals BP Pulse Temp Resp Height(growth percentile) Weight(growth percentile) 175/84 (BP 1 Location: Left arm, BP Patient Position: Sitting) 72 98.6 °F (37 °C) (Oral) 18 5' 3\" (1.6 m) 233 lb (105.7 kg) LMP SpO2 BMI OB Status Smoking Status 09/02/2007 100% 41.27 kg/m2 Hysterectomy Never Smoker Vitals History BMI and BSA Data Body Mass Index Body Surface Area  
 41.27 kg/m 2 2.17 m 2 Preferred Pharmacy Pharmacy Name Phone [facility] [id number] AT [facility] [phone] Your Updated Medication List  
  
   
This list is accurate as of 3/27/18  1:51 PM.  Always use your most recent med list.  
  
  
  
  
 albuterol 90 mcg/actuation inhaler Commonly known as:  PROVENTIL HFA, VENTOLIN HFA, PROAIR HFA Take 2 Puffs by inhalation every four (4) hours as needed for Wheezing. Blood-Glucose Meter monitoring kit Use prn for glucose checks at variety of times, fasting, 2hr pp, bedtime Diagnosis code: E 11.65  
  
 fluticasone-vilanterol 100-25 mcg/dose inhaler Commonly known as:  BREO ELLIPTA Take 1 Puff by inhalation daily. glipiZIDE 10 mg tablet Commonly known as:  GLUCOTROL  
TAKE 1 TAB BY MOUTH TWO (2) TIMES A DAY. glucose blood VI test strips strip Commonly known as:  ONETOUCH ULTRA TEST Test 4 times a day. losartan-hydroCHLOROthiazide 100-25 mg per tablet Commonly known as:  HYZAAR Take 1 Tab by mouth daily. lovastatin 40 mg tablet Commonly known as:  MEVACOR Take 1 Tab by mouth nightly. metFORMIN 1,000 mg tablet Commonly known as:  GLUCOPHAGE Take 1 Tab by mouth two (2) times daily (with meals). montelukast 10 mg tablet Commonly known as:  SINGULAIR Take 1 Tab by mouth daily. Prescriptions Sent to Pharmacy Refills  
 metFORMIN (GLUCOPHAGE) 1,000 mg tablet 2 Sig: Take 1 Tab by mouth two (2) times daily (with meals). Class: Normal  
 Pharmacy: [id number] [place], [address]  AT [facility] Ph #: [phone] Route: Oral  
 losartan-hydroCHLOROthiazide (HYZAAR) 100-25 mg per tablet 0 Sig: Take 1 Tab by mouth daily. Class: Normal  
 Pharmacy: [address] Ph #: [phone] Route: Oral  
  
We Performed the Following HEMOGLOBIN A1C WITH EAG [26243 CPT(R)] VITAMIN B12 [id number] CPT(R)] Follow-up Instructions Return in about 2 weeks (around 4/10/2018) for Follow Up. Patient Instructions Foot Arthritis: Exercises Your Care Instructions Here are some examples of exercises for foot arthritis. Start each exercise slowly. Ease off the exercise if you start to have pain. Your doctor or physical therapist will tell you when you can start these exercises and which ones will work best for you. How to do the exercises Calf stretch (knees straight) 1. Place a book on the floor a few inches from a wall or countertop, and put the balls of your feet on it. Your heels should be on the floor. The book needs to be thick enough so that you can feel a gentle stretch in your calf. If you are not steady on your feet, hold on to a chair, counter, or wall while you do this stretch. 2. Keep your knees straight, and lean forward until you feel a stretch in your calf. 3. To get more stretch, add another book or use a thicker book, such as a phone book, a dictionary, or an encyclopedia. 4. Hold the stretch for at least 15 to 30 seconds. 5. Repeat 2 to 4 times. Calf stretch (knees bent) 1. Place a book on the floor a few inches from a wall or countertop, and put the balls of your feet on it. Your heels should be on the floor. The book needs to be thick enough so that you can feel a gentle stretch in your calf. If you are not steady on your feet, hold on to a chair, counter, or wall while you do this stretch. 2. Bend your knees, and lean forward until you feel a stretch in your calf. 3. To get more stretch, add another book or use a thicker book, such as a phone book, a dictionary, or an encyclopedia. 4. Hold the stretch for at least 15 to 30 seconds. 5. Repeat 2 to 4 times. Great toe extension stretch 1. Sit in a chair, and put your affected foot across your other knee. 2. Grasp your heel with one hand and then slowly pull your big toe back with your other hand. Pull your toe back toward your ankle until you feel a stretch along the bottom of your foot. 3. Hold the stretch for at least 15 to 30 seconds. 4. Repeat 2 to 4 times. 5. Switch feet and repeat steps 1 through 4, even if only one foot is sore. Great toe flexion stretch 1. Sit in a chair, and put your affected foot across your other knee. 2. Grasp your heel with one hand and then slowly push your big toe down with your other hand. Push your toe down and away from your ankle until you feel a stretch along the top of your foot. 3. Hold the stretch for at least 15 to 30 seconds. 4. Repeat 2 to 4 times. 5. Switch feet and repeat steps 1 through 4, even if only one foot is sore. Ankle alphabet 1. Sit in a chair with your feet flat on the floor. (You can also do this exercise lying on your back with your affected leg propped up on a pillow). 2. Lift the heel of your sore foot off the floor, and slowly trace the letters of the alphabet. 3. Switch feet and repeat steps 1 through 2, even if only one foot is sore. Resisted ankle dorsiflexion 1. Tie the ends of an exercise band together to form a loop. Attach one end of the loop to a secure object or shut a door on it to hold it in place. (Or you can have someone hold one end of the loop to provide resistance.) 2. While sitting on the floor or in a chair, loop the other end of the band over the top of your affected foot. 3. Keeping your knee and leg straight, slowly flex your foot to pull back on the exercise band, and then slowly relax. 4. Repeat 8 to 12 times. 5. Switch feet and repeat steps 1 through 4, even if only one foot is sore. Towel curl 1. While sitting, place your affected foot on a towel on the floor, and scrunch the towel toward you with your toes. 2. Then use your toes to push the towel away from you. 3. Repeat 8 to 12 times. 4. Switch feet and repeat steps 1 through 3, even if only one foot is sore. Resisted ankle eversion 1. Sit on the floor with your legs straight. 2. Hold both ends of an exercise band and loop the band around the outside of your affected foot. Then press your other foot against the band. 3. Keeping your leg straight, slowly push your affected foot outward against the band and away from your other foot without letting your leg rotate. Then slowly relax. 4. Repeat 8 to 12 times. 5. Switch feet and repeat steps 1 through 4, even if only one foot is sore. Resisted ankle inversion 1. Sit on the floor with your good leg crossed over your other leg. 2. Hold both ends of an exercise band and loop the band around the inside of your affected foot. Then press your other foot against the band. 3. Keeping your legs crossed, slowly push your affected foot against the band so that foot moves away from your other foot. Then slowly relax. 4. Repeat 8 to 12 times. 5. Switch feet and repeat steps 1 through 4, even if only one foot is sore. Follow-up care is a key part of your treatment and safety. Be sure to make and go to all appointments, and call your doctor if you are having problems. It's also a good idea to know your test results and keep a list of the medicines you take. Where can you learn more? Go to http://mateusz-tsering.info/. Enter K113 in the search box to learn more about \"Foot Arthritis: Exercises. \" Current as of: March 21, 2017 Content Version: 11.4 © 4746-6299 Healthwise, [employer].  Care instructions adapted under license by [employer] (which disclaims liability or warranty for this information). If you have questions about a medical condition or this instruction, always ask your healthcare professional. [address] any warranty or liability for your use of this information. Introducing [facility]! [facility] introduces [employer] patient portal. Now you can access parts of your medical record, email your doctor's office, and request medication refills online. 1. In your internet browser, go to [email or web address] 2. Click on the First Time User? Click Here link in the Sign In box. You will see the New Member Sign Up page. 3. Enter your [employer] Access Code exactly as it appears below. You will not need to use this code after youve completed the sign-up process. If you do not sign up before the expiration date, you must request a new code. · [employer] Access Code: [email or web address] Expires: 6/25/2018  1:47 PM 
 
4. Enter the last four digits of your Social Security Number (xxxx) and Date of Birth (mm/dd/yyyy) as indicated and click Submit. You will be taken to the next sign-up page. 5. Create a [employer] ID. This will be your [employer] login ID and cannot be changed, so think of one that is secure and easy to remember. 6. Create a [employer] password. You can change your password at any time. 7. Enter your Password Reset Question and Answer. This can be used at a later time if you forget your password. 8. Enter your e-mail address. You will receive e-mail notification when new information is available in [address]. 9. Click Sign Up. You can now view and download portions of your medical record. 10. Click the Download Summary menu link to download a portable copy of your medical information. If you have questions, please visit the Frequently Asked Questions section of the [employer] website. Remember, [employer] is NOT to be used for urgent needs. For medical emergencies, dial 911. Now available from your iPhone and Android! Please provide this summary of care documentation to your next provider. Your primary care clinician is listed as [employer]. If you have any questions after today's visit, please call [phone].

## 2018-03-27 NOTE — PROGRESS NOTES
Assessment / Plan   Diagnoses and all orders for this visit:    1. Extensor tendonitis of foot  Comments:  Weight loss, home exercises, less tight shoes / inserts, tylenol / topical NSAID, RTC INI. 2. Diabetes mellitus without complication ([address]  Comments:  Has been a long time since A1c. Rechecking A1c as well as a B12. Increasing Metformin. Orders:  -     HEMOGLOBIN A1C WITH EAG  -     VITAMIN B12  -     metFORMIN (GLUCOPHAGE) 1,000 mg tablet; Take 1 Tab by mouth two (2) times daily (with meals). 3. Essential hypertension  Comments:  Increasing Hyzaar, RTC in 2 weeks. Orders:  -     losartan-hydroCHLOROthiazide (HYZAAR) 100-25 mg per tablet; Take 1 Tab by mouth daily. Follow-up Disposition:  Return in about 2 weeks (around 4/10/2018) for Follow Up. I have discussed the diagnosis with the patient and the intended plan as seen in the above orders. The patient has received an after-visit summary and questions were answered concerning future plans. I have discussed medication side effects and warnings with the patient as well. Mara Klein MD  Family Medicine Resident       HPI     Chief Complaint   Patient presents with    Complete Physical    Foot Swelling     bilateral     She is a 61 y.o. female who presents for bilateral foot pain. She notes it's been going on for 2-3 weeks. She states her day starts off normal but with her work she is on her feet most of the time and by mid afternoon the top of her feet hurt bilaterally. Pain is dull, non-radiating, up to 6/10. She has tried foot inserts and tylenol once a day in the morning. She feels sometimes her feet get swollen by the end of the day. She reports compliance with her meds but never got Januvia as her insurance didn't pay for it. She takes 1000 mg metformin a day (500 BID) and Glipizide 10 mg BID. Review of Systems - No CP, nausea, vomiting, palpitations, diarrhea, other leg pains, rashes.      Reviewed PmHx, RxHx, FmHx, SocHx, AllgHx and updated and dated in the chart. Physical Exam:  Visit Vitals    /84 (BP 1 Location: Left arm, BP Patient Position: Sitting)    Pulse 72    Temp 98.6 °F (37 °C) (Oral)    Resp 18    Ht 5' 3\" (1.6 m)    Wt 233 lb (105.7 kg)    LMP 09/02/2007    SpO2 100%    BMI 41.27 kg/m2     Physical Exam   Constitutional: She is oriented to person, place, and time. She appears well-developed and well-nourished. HENT:   Head: Normocephalic and atraumatic. Nose: Nose normal.   Eyes: Conjunctivae are normal. Right eye exhibits no discharge. Left eye exhibits no discharge. No scleral icterus. Cardiovascular: Normal rate, regular rhythm and normal heart sounds. Exam reveals no gallop and no friction rub. Pulmonary/Chest: Effort normal and breath sounds normal. No respiratory distress. Abdominal: Soft. Bowel sounds are normal. She exhibits no distension. There is no tenderness. Musculoskeletal: She exhibits no edema. No masses palpated in the feet B/L. ROM intact, neurovascularly intact. Pain is reproduced with extension of the feet. TTP mid foot on the top, area of 2-4th metatarsals. Neurological: She is alert and oriented to person, place, and time. Skin: Skin is warm and dry. No rash noted. She is not diaphoretic. No erythema. No pallor. Psychiatric: She has a normal mood and affect. Judgment normal.   Vitals reviewed.

## 2018-03-27 NOTE — PROGRESS NOTES
1. Have you been to the ER, urgent care clinic since your last visit? Hospitalized since your last visit? No    2. Have you seen or consulted any other health care providers outside of the [address] [place] [address] since your last visit? Include any pap smears or colon screening. No    Chief Complaint   Patient presents with    Complete Physical    Foot Swelling     bilateral       Blood pressure 159/77, pulse 79, temperature 98.6 °F (37 °C), temperature source Oral, resp. rate 18, height 5' 3\" (1.6 m), weight 233 lb (105.7 kg), last menstrual period 09/02/2007, SpO2 100 %.

## 2018-03-27 NOTE — PROGRESS NOTES
Here for bilateral foot edema   Works on her feet a lot  Dull pain, non-radiating    Staying off her feet helps    Diabetic, HgbA1C markedly elevated last fall -- will make adjustments in her meds    Foot pain:  Better fitting shoes, check vitamin B12; weight control    HTN:  Will need adjustments with her medications    F/U in couple of weeks to assess medication changes    Needs to get caught up with diabetic treatments    I reviewed with the resident the medical history and the resident's findings on the physical examination. I discussed with the resident the patient's diagnosis and concur with the plan.

## 2018-03-27 NOTE — PATIENT INSTRUCTIONS
Foot Arthritis: Exercises  Your Care Instructions  Here are some examples of exercises for foot arthritis. Start each exercise slowly. Ease off the exercise if you start to have pain. Your doctor or physical therapist will tell you when you can start these exercises and which ones will work best for you. How to do the exercises  Calf stretch (knees straight)    1. Place a book on the floor a few inches from a wall or countertop, and put the balls of your feet on it. Your heels should be on the floor. The book needs to be thick enough so that you can feel a gentle stretch in your calf. If you are not steady on your feet, hold on to a chair, counter, or wall while you do this stretch. 2. Keep your knees straight, and lean forward until you feel a stretch in your calf. 3. To get more stretch, add another book or use a thicker book, such as a phone book, a dictionary, or an encyclopedia. 4. Hold the stretch for at least 15 to 30 seconds. 5. Repeat 2 to 4 times. Calf stretch (knees bent)    1. Place a book on the floor a few inches from a wall or countertop, and put the balls of your feet on it. Your heels should be on the floor. The book needs to be thick enough so that you can feel a gentle stretch in your calf. If you are not steady on your feet, hold on to a chair, counter, or wall while you do this stretch. 2. Bend your knees, and lean forward until you feel a stretch in your calf. 3. To get more stretch, add another book or use a thicker book, such as a phone book, a dictionary, or an encyclopedia. 4. Hold the stretch for at least 15 to 30 seconds. 5. Repeat 2 to 4 times. Great toe extension stretch    1. Sit in a chair, and put your affected foot across your other knee. 2. Grasp your heel with one hand and then slowly pull your big toe back with your other hand. Pull your toe back toward your ankle until you feel a stretch along the bottom of your foot.   3. Hold the stretch for at least 15 to 30 Patient reports feeling \"like my calf was strained,\" for the last two days. Patient reports then walking down the stairs today and felt \"a big pop\" and suddenly had severe left calf pain. Patient reports being unable to bear weight on left side.  [place] seconds. 4. Repeat 2 to 4 times. 5. Switch feet and repeat steps 1 through 4, even if only one foot is sore. Great toe flexion stretch    1. Sit in a chair, and put your affected foot across your other knee. 2. Grasp your heel with one hand and then slowly push your big toe down with your other hand. Push your toe down and away from your ankle until you feel a stretch along the top of your foot. 3. Hold the stretch for at least 15 to 30 seconds. 4. Repeat 2 to 4 times. 5. Switch feet and repeat steps 1 through 4, even if only one foot is sore. Ankle alphabet    1. Sit in a chair with your feet flat on the floor. (You can also do this exercise lying on your back with your affected leg propped up on a pillow). 2. Lift the heel of your sore foot off the floor, and slowly trace the letters of the alphabet. 3. Switch feet and repeat steps 1 through 2, even if only one foot is sore. Resisted ankle dorsiflexion    1. Tie the ends of an exercise band together to form a loop. Attach one end of the loop to a secure object or shut a door on it to hold it in place. (Or you can have someone hold one end of the loop to provide resistance.)  2. While sitting on the floor or in a chair, loop the other end of the band over the top of your affected foot. 3. Keeping your knee and leg straight, slowly flex your foot to pull back on the exercise band, and then slowly relax. 4. Repeat 8 to 12 times. 5. Switch feet and repeat steps 1 through 4, even if only one foot is sore. Towel curl    1. While sitting, place your affected foot on a towel on the floor, and scrunch the towel toward you with your toes. 2. Then use your toes to push the towel away from you. 3. Repeat 8 to 12 times. 4. Switch feet and repeat steps 1 through 3, even if only one foot is sore. Resisted ankle eversion    1. Sit on the floor with your legs straight.   2. Hold both ends of an exercise band and loop the band around the outside of your affected foot. Then press your other foot against the band. 3. Keeping your leg straight, slowly push your affected foot outward against the band and away from your other foot without letting your leg rotate. Then slowly relax. 4. Repeat 8 to 12 times. 5. Switch feet and repeat steps 1 through 4, even if only one foot is sore. Resisted ankle inversion    1. Sit on the floor with your good leg crossed over your other leg. 2. Hold both ends of an exercise band and loop the band around the inside of your affected foot. Then press your other foot against the band. 3. Keeping your legs crossed, slowly push your affected foot against the band so that foot moves away from your other foot. Then slowly relax. 4. Repeat 8 to 12 times. 5. Switch feet and repeat steps 1 through 4, even if only one foot is sore. Follow-up care is a key part of your treatment and safety. Be sure to make and go to all appointments, and call your doctor if you are having problems. It's also a good idea to know your test results and keep a list of the medicines you take. Where can you learn more? Go to http://mateusz-tsering.info/. Enter K113 in the search box to learn more about \"Foot Arthritis: Exercises. \"  Current as of: March 21, 2017  Content Version: 11.4  © 5181-5247 [employer]. Care instructions adapted under license by [employer] (which disclaims liability or warranty for this information). If you have questions about a medical condition or this instruction, always ask your healthcare professional. [place] [address] any warranty or liability for your use of this information.

## 2018-03-28 LAB
EST. AVERAGE GLUCOSE BLD GHB EST-MCNC: 203 MG/DL
HBA1C MFR BLD: 8.7 % (ref 4.8–5.6)
VIT B12 SERPL-MCNC: 411 PG/ML (ref 232–1245)

## 2018-04-18 ENCOUNTER — OFFICE VISIT (OUTPATIENT)
Dept: FAMILY MEDICINE CLINIC | Age: 63
End: 2018-04-18

## 2018-04-18 VITALS
OXYGEN SATURATION: 100 % | SYSTOLIC BLOOD PRESSURE: 167 MMHG | DIASTOLIC BLOOD PRESSURE: 80 MMHG | RESPIRATION RATE: 18 BRPM | HEIGHT: 63 IN | HEART RATE: 64 BPM | TEMPERATURE: 98.3 F | BODY MASS INDEX: 41.11 KG/M2 | WEIGHT: 232 LBS

## 2018-04-18 DIAGNOSIS — I10 ESSENTIAL HYPERTENSION: Primary | ICD-10-CM

## 2018-04-18 RX ORDER — AMLODIPINE BESYLATE 10 MG/1
10 TABLET ORAL DAILY
Qty: 30 TAB | Refills: 2 | Status: SHIPPED | OUTPATIENT
Start: 2018-04-18 | End: 2018-05-01 | Stop reason: SDUPTHER

## 2018-04-18 NOTE — PROGRESS NOTES
Assessment / Plan   Diagnoses and all orders for this visit:    1. Essential hypertension  Comments:  Not at goal at all, adding Amlodipine, discussed dietary changes. Orders:  -     amLODIPine (NORVASC) 10 mg tablet; Take 1 Tab by mouth daily. Follow-up Disposition:  Return in about 2 weeks (around 5/2/2018) for Follow Up BP. .    I have discussed the diagnosis with the patient and the intended plan as seen in the above orders. The patient has received an after-visit summary and questions were answered concerning future plans. I have discussed medication side effects and warnings with the patient as well. Michael Bazzi MD  Family Medicine Resident       HPI     Chief Complaint   Patient presents with    Blood Pressure Check     follow up last appointment     She is a 61 y.o. female who presents for HTN follow up. She is still drinking 2x diet sodas per day, eating fried chicken and other high salt foods. Has taken her meds for the past 4 days but admits to occasionally forgetting her medicines that day. Does not get regular exercise at this time. No CP, palpitations, SOB. Review of Systems - No fevers, chills, nausea, vomiting, diarrhea. Reviewed PmHx, RxHx, FmHx, SocHx, AllgHx and updated and dated in the chart. Physical Exam:  Visit Vitals    /80 (BP 1 Location: Left arm, BP Patient Position: Sitting)    Pulse 64    Temp 98.3 °F (36.8 °C) (Oral)    Resp 18    Ht 5' 3\" (1.6 m)    Wt 232 lb (105.2 kg)    LMP 09/02/2007    SpO2 100%    BMI 41.1 kg/m2     Physical Exam   Constitutional: She is oriented to person, place, and time. She appears well-developed and well-nourished. Morbidly obese. HENT:   Head: Normocephalic and atraumatic. Nose: Nose normal.   Eyes: Conjunctivae are normal. Right eye exhibits no discharge. Left eye exhibits no discharge. No scleral icterus. Cardiovascular: Normal rate, regular rhythm and normal heart sounds.   Exam reveals no gallop and no friction rub. Pulmonary/Chest: Effort normal and breath sounds normal. No respiratory distress. Musculoskeletal: She exhibits no edema or tenderness. Neurological: She is alert and oriented to person, place, and time. Skin: Skin is warm and dry. No rash noted. She is not diaphoretic. No erythema. No pallor. Psychiatric: She has a normal mood and affect. Judgment normal.   Vitals reviewed.

## 2018-04-18 NOTE — MR AVS SNAPSHOT
[address] [id number] Patient: Marina Yanez MRN: [id number] Visit Information Date & Time Provider Department Dept. Phone Encounter #  
 4/18/2018  8:20 AM Hero Mcfadden MD [address] [place] [address] [phone] [id number] Follow-up Instructions Return in about 2 weeks (around 5/2/2018) for Follow Up BP. Nikhil Montoya Follow-up and Disposition History Upcoming Health Maintenance Date Due  
 EYE EXAM RETINAL OR DILATED Q1 2/17/1965 FOBT Q 1 YEAR AGE 50-75 10/1/2016 FOOT EXAM Q1 8/17/2018 MICROALBUMIN Q1 8/17/2018 LIPID PANEL Q1 8/17/2018 HEMOGLOBIN A1C Q6M 9/27/2018 PAP AKA CERVICAL CYTOLOGY 10/1/2018 BREAST CANCER SCRN MAMMOGRAM 8/17/2019 DTaP/Tdap/Td series (2 - Td) 9/8/2025 Allergies as of 4/18/2018  Review Complete On: 4/18/2018 By: Sourav Lacey LPN Severity Noted Reaction Type Reactions Codeine  09/02/2015    Nausea and Vomiting Tramadol  12/30/2015    Nausea and Vomiting Current Immunizations  Reviewed on 3/27/2018 Name Date Influenza Vaccine 11/6/2017, 9/10/2015 Influenza Vaccine (Quad) PF 11/23/2016 Pneumococcal Polysaccharide (PPSV-23) 8/19/2016 Tdap 9/8/2015  9:07 AM  
  
 Not reviewed this visit You Were Diagnosed With   
  
 Codes Comments Essential hypertension    -  Primary ICD-10-CM: I10 
ICD-9-CM: 401.9 Not at goal at all, adding Amlodipine, discussed dietary changes. Vitals BP Pulse Temp Resp Height(growth percentile) Weight(growth percentile) 167/80 (BP 1 Location: Left arm, BP Patient Position: Sitting) 64 98.3 °F (36.8 °C) (Oral) 18 5' 3\" (1.6 m) 232 lb (105.2 kg) LMP SpO2 BMI OB Status Smoking Status 09/02/2007 100% 41.1 kg/m2 Hysterectomy Never Smoker Vitals History BMI and BSA Data Body Mass Index Body Surface Area  
 41.1 kg/m 2 2.16 m 2 Preferred Pharmacy Pharmacy Name Phone [id number]- [place], [address] AT [facility] [phone] Your Updated Medication List  
  
   
This list is accurate as of 4/18/18  4:16 PM.  Always use your most recent med list.  
  
  
  
  
 albuterol 90 mcg/actuation inhaler Commonly known as:  PROVENTIL HFA, VENTOLIN HFA, PROAIR HFA Take 2 Puffs by inhalation every four (4) hours as needed for Wheezing. amLODIPine 10 mg tablet Commonly known as:  Ac [place] Take 1 Tab by mouth daily. Blood-Glucose Meter monitoring kit Use prn for glucose checks at variety of times, fasting, 2hr pp, bedtime Diagnosis code: E 11.65  
  
 fluticasone-vilanterol 100-25 mcg/dose inhaler Commonly known as:  BREO ELLIPTA Take 1 Puff by inhalation daily. glipiZIDE 10 mg tablet Commonly known as:  GLUCOTROL  
TAKE 1 TAB BY MOUTH TWO (2) TIMES A DAY. glucose blood VI test strips strip Commonly known as:  ONETOUCH ULTRA TEST Test 4 times a day. losartan-hydroCHLOROthiazide 100-25 mg per tablet Commonly known as:  HYZAAR Take 1 Tab by mouth daily. lovastatin 40 mg tablet Commonly known as:  MEVACOR Take 1 Tab by mouth nightly. metFORMIN 1,000 mg tablet Commonly known as:  GLUCOPHAGE Take 1 Tab by mouth two (2) times daily (with meals). montelukast 10 mg tablet Commonly known as:  SINGULAIR Take 1 Tab by mouth daily. Prescriptions Sent to Pharmacy Refills  
 amLODIPine (NORVASC) 10 mg tablet 2 Sig: Take 1 Tab by mouth daily. Class: Normal  
 Pharmacy: [address] [place] [address] Ph #: [phone] Route: Oral  
  
Follow-up Instructions Return in about 2 weeks (around 5/2/2018) for Follow Up BP. Sang Melgars Introducing [facility]!    
 Milli Dill introduces [email or web address] patient portal. Now you can access parts of your medical record, email your doctor's office, and request medication refills online. 1. In your internet browser, go to [email or web address] 2. Click on the First Time User? Click Here link in the Sign In box. You will see the New Member Sign Up page. 3. Enter your [employer] Access Code exactly as it appears below. You will not need to use this code after youve completed the sign-up process. If you do not sign up before the expiration date, you must request a new code. · [employer] Access Code: [email or web address] Expires: 6/25/2018  1:47 PM 
 
4. Enter the last four digits of your Social Security Number (xxxx) and Date of Birth (mm/dd/yyyy) as indicated and click Submit. You will be taken to the next sign-up page. 5. Create a [employer] ID. This will be your [employer] login ID and cannot be changed, so think of one that is secure and easy to remember. 6. Create a [employer] password. You can change your password at any time. 7. Enter your Password Reset Question and Answer. This can be used at a later time if you forget your password. 8. Enter your e-mail address. You will receive e-mail notification when new information is available in [address] 9. Click Sign Up. You can now view and download portions of your medical record. 10. Click the Download Summary menu link to download a portable copy of your medical information. If you have questions, please visit the Frequently Asked Questions section of the [employer] website. Remember, [employer] is NOT to be used for urgent needs. For medical emergencies, dial 911. Now available from your iPhone and Android! Please provide this summary of care documentation to your next provider. Your primary care clinician is listed as Christi Lew. If you have any questions after today's visit, please call [phone].

## 2018-04-18 NOTE — PROGRESS NOTES
1. Have you been to the ER, urgent care clinic since your last visit? Hospitalized since your last visit? No    2. Have you seen or consulted any other health care providers outside of the [facility] since your last visit? Include any pap smears or colon screening. No    Chief Complaint   Patient presents with    Blood Pressure Check     follow up last appointment       Blood pressure 166/82, pulse 69, temperature 98.3 °F (36.8 °C), temperature source Oral, resp. rate 18, height 5' 3\" (1.6 m), weight 232 lb (105.2 kg), last menstrual period 09/02/2007, SpO2 100 %.       Rechecked B/P 167/80

## 2018-05-01 ENCOUNTER — OFFICE VISIT (OUTPATIENT)
Dept: FAMILY MEDICINE CLINIC | Age: 63
End: 2018-05-01

## 2018-05-01 VITALS
WEIGHT: 231 LBS | BODY MASS INDEX: 40.93 KG/M2 | SYSTOLIC BLOOD PRESSURE: 150 MMHG | HEIGHT: 63 IN | OXYGEN SATURATION: 100 % | RESPIRATION RATE: 18 BRPM | DIASTOLIC BLOOD PRESSURE: 71 MMHG | TEMPERATURE: 97.8 F | HEART RATE: 80 BPM

## 2018-05-01 DIAGNOSIS — Z00.00 PREVENTATIVE HEALTH CARE: Primary | ICD-10-CM

## 2018-05-01 DIAGNOSIS — Z12.11 COLON CANCER SCREENING: ICD-10-CM

## 2018-05-01 DIAGNOSIS — I10 ESSENTIAL HYPERTENSION: ICD-10-CM

## 2018-05-01 DIAGNOSIS — Z13.31 SCREENING FOR DEPRESSION: ICD-10-CM

## 2018-05-01 DIAGNOSIS — Z12.31 ENCOUNTER FOR SCREENING MAMMOGRAM FOR BREAST CANCER: ICD-10-CM

## 2018-05-01 DIAGNOSIS — Z90.710 H/O HYSTERECTOMY FOR BENIGN DISEASE: ICD-10-CM

## 2018-05-01 DIAGNOSIS — E11.9 DIABETES MELLITUS WITHOUT COMPLICATION (HCC): ICD-10-CM

## 2018-05-01 DIAGNOSIS — Z23 NEED FOR SHINGLES VACCINE: ICD-10-CM

## 2018-05-01 PROBLEM — E66.01 OBESITY, MORBID (HCC): Status: ACTIVE | Noted: 2018-05-01

## 2018-05-01 RX ORDER — LOSARTAN POTASSIUM 100 MG/1
100 TABLET ORAL DAILY
Qty: 90 TAB | Refills: 3 | Status: SHIPPED | OUTPATIENT
Start: 2018-05-01 | End: 2018-11-27 | Stop reason: SDUPTHER

## 2018-05-01 RX ORDER — METOPROLOL SUCCINATE 25 MG/1
25 TABLET, EXTENDED RELEASE ORAL DAILY
Qty: 90 TAB | Refills: 3 | Status: SHIPPED | OUTPATIENT
Start: 2018-05-01 | End: 2018-05-17 | Stop reason: SDUPTHER

## 2018-05-01 RX ORDER — ESTRADIOL 0.1 MG/G
CREAM VAGINAL
Qty: 42.5 G | Refills: 3 | Status: SHIPPED | OUTPATIENT
Start: 2018-05-01 | End: 2018-11-27

## 2018-05-01 RX ORDER — AMLODIPINE BESYLATE 5 MG/1
5 TABLET ORAL DAILY
Qty: 90 TAB | Refills: 3 | Status: SHIPPED | OUTPATIENT
Start: 2018-05-01 | End: 2018-11-27 | Stop reason: SDUPTHER

## 2018-05-01 NOTE — LETTER
5/1/2018 10:22 AM 
 
Ms. Phu Mcallister [address] [place] [id number] Body mass index is 40.92 kg/(m^2). Focus on regular exercise (150 minutes each week) and healthy eating. Eat more fruits and vegetables. Eat more protein (egg whites, beans, and nuts you know you tolerate) and less carbohydrates (white bread, white rice, white pasta, white potatoes, sodas, and sweets). Eat appropriately small portion sizes. Consider vaccines for shingles and PCV 13 Obtain colonoscopy Dr. Shelley El #[phone]. Return your stool cards when ready Please call [facility] to help arrange and authorize any tests and/or referrals. Her # is [phone] Central Scheduling at [facility] is #[phone] Sincerely, Diana Louis MD

## 2018-05-01 NOTE — PROGRESS NOTES
Chief Complaint   Patient presents with    Hypertension     2 week follow up on BP     1. Have you been to the ER, urgent care clinic since your last visit? Hospitalized since your last visit? No    2. Have you seen or consulted any other health care providers outside of the [facility] since your last visit? Include any pap smears or colon screening. No     Reviewed record in preparation for visit and have obtained necessary documentation.

## 2018-05-01 NOTE — PROGRESS NOTES
Ruddy Azul is a 61 y.o. female      Issues discussed today include:    BP check. Not optimal.  We should avoid thiazides due to gout. She is having pedal edema on norvasc 10 mg      Data reviewed or ordered today:  3/27/2018:  a1c 8.7/LDL 88/ MAB negative      WELLNESS     GYN:  THAO/RENEA age 46 for benign reasons  Mammogram:  Normal 2017 but I do not see report from [facility]  last pap:  No longer  DEXA:  Done and normal per patient    Hearing screen:   done  Vision screening:   done  Depression screening:   done  Fall assessment:   done      We discussed health maintenance    BMI = Body mass index is 40.92 kg/(m^2). See letter    We discussed diet/exercise/healthy weight    We reviewed and updated pertinent past medical history in the problem list      Colonoscopy:  She needs now  FOBT:  2018  TDAP:  2015  Pneumovax:  2016  PCV-13:  Rx given  Flu shot:  2017  Zostavax:  Rx given  Eye exam:  Needs 2018  EKG: On file    FTF for DME:  done:  She is not checking blood sugars  Advanced directive:  Full code  Specialists:  NEGRO Linares    In general, I advise patients to be as active as possible. I believe exercise is the key to long life and good health. The current recommendation is for individuals to exercise for 150 minutes each week (in other words 30 minutes 5 days a week). Exercise should be vigorous enough to work up a sweat. These activities include brisk walking, running, tennis, swimming, weight-lifting, etc.     I usually tell folks that work is work and exercise is exercise. Each of these activities has a different goal.  Even though you may be active at work, it may not be aerobically adequate. So build dedicated exercise time into your weekly routine. If any patient drinks alcohol, I typically suggest that a male drink only 2 beers (or glasses of wine, or shots of liquor) in any 24 hour period ( and not daily). For females, the limits are one drink per 24 hours (and not daily).   After these limits, the toxic effects of alcohol consumption start to manifest.     Avoid tobacco products. I may suggest specific smoking cessation instructions if needed. I typically suggest a wellness exam yearly during your birth month to update health maintenance issues such as fasting labs, EKGs and other studies, appropriate cancer screenings,  female exams as appropriate, immunizations, etc.    I suggest a yearly flu shot. Please call Kervin Buitrago to help arrange and authorize any tests or referrals. Her # is [phone]. For [employer] at [employer] #[phone]. Other problems include:  Patient Active Problem List   Diagnosis Code    [facility] complication [facility] P96.2    Essential hypertension I10    RAD (reactive airway disease) J45.909    Facial droop R29.810    S/P THAO-BSO Z90.710, Z90.722, Z90.79    H/O colonoscopy Z98.890    OA (osteoarthritis) M19.90    Gout M10.9    Obesity, morbid (HCC) E66.01       Medications:  Current Outpatient Prescriptions   Medication Sig Dispense Refill    losartan (COZAAR) 100 mg tablet Take 1 Tab by mouth daily. 90 Tab 3    amLODIPine (NORVASC) 5 mg tablet Take 1 Tab by mouth daily. 90 Tab 3    metoprolol succinate (TOPROL-XL) 25 mg XL tablet Take 1 Tab by mouth daily. 90 Tab 3    estradiol (ESTRACE) 0.01 % (0.1 mg/gram) vaginal cream Use intravaginally twice a week 42.5 g 3    metFORMIN (GLUCOPHAGE) 1,000 mg tablet Take 1 Tab by mouth two (2) times daily (with meals). 60 Tab 2    fluticasone-vilanterol (BREO ELLIPTA) 100-25 mcg/dose inhaler Take 1 Puff by inhalation daily. 3 Inhaler 3    lovastatin (MEVACOR) 40 mg tablet Take 1 Tab by mouth nightly. 90 Tab 3    glipiZIDE (GLUCOTROL) 10 mg tablet TAKE 1 TAB BY MOUTH TWO (2) TIMES A DAY. 180 Tab 3    albuterol (PROVENTIL HFA, VENTOLIN HFA, PROAIR HFA) 90 mcg/actuation inhaler Take 2 Puffs by inhalation every four (4) hours as needed for Wheezing.  1 Inhaler 3    montelukast (SINGULAIR) 10 mg tablet Take 1 Tab by mouth daily. 90 Tab 3    glucose blood VI test strips (ONETOUCH ULTRA TEST) strip Test 4 times a day. 100 Strip 2    Blood-Glucose Meter monitoring kit Use prn for glucose checks at variety of times, fasting, 2hr pp, bedtime Diagnosis code: E 11.65 1 Kit 1       Allergies: Allergies   Allergen Reactions    Codeine Nausea and Vomiting    Tramadol Nausea and Vomiting       LMP:  Patient's last menstrual period was 09/02/2007. Social History     Social History    Marital status: SINGLE     Spouse name: N/A    Number of children: N/A    Years of education: N/A     Occupational History    Not on file. Social History Main Topics    Smoking status: Never Smoker    Smokeless tobacco: Never Used    Alcohol use No    Drug use: No    Sexual activity: No     Other Topics Concern    Not on file     Social History Narrative         Family History   Problem Relation Age of Onset    Diabetes Father     Diabetes Sister      Other family history:  CAD, stroke    Meaningful use:  done      ROS:  Headaches:  no  Chest Pain:  no  SOB:  no  Fevers:  no  Falls:  no  anxiety/depression/losing interest in doing things that were previously enjoyed:  no.   PHQ2 = 0  Other significant ROS:    Patient denied problems with:    Hearing/vision, speaking/swallowing, Reflux/indigestion, Cough,Diarrhea/constipation,Problems passing or controlling urine,  Mood (anxiety/depression/losing interest in doing things that were previously enjoyed), Snoring/sleep apnea,Fatigue, Weight change, memory                                                         Any other Positive ROS include: cramps        Falls in the past 12 months:  no           Over the last year (or since your last visit):  Have you been diagnosed with heart attack, stroke, broken bones, or skin cancer = no    Exercise:  Needs more             Smoking history:  none                            Lives with family members        Patient's last menstrual period was 09/02/2007. Physical Exam  Visit Vitals    /71 (BP 1 Location: Left arm, BP Patient Position: Sitting)    Pulse 80    Temp 97.8 °F (36.6 °C) (Oral)    Resp 18    Ht 5' 3\" (1.6 m)    Wt 231 lb (104.8 kg)    LMP 09/02/2007    SpO2 100%    BMI 40.92 kg/m2     BP Readings from Last 3 Encounters:   05/01/18 150/71   04/18/18 167/80   03/27/18 175/84     Constitutional:  Appears well,  No Acute Distress, Vitals noted  Psychiatric:   Affect normal, Alert and cooperative, Oriented to person/place/time    Right sided facial droop stable    Eyes:   Pupils equally round and reactive, EOMI, conjunctiva clear, eyelids normal  ENT:   External ears and nose normal/lips, teeth=OK/gums normal, TMs and Orophyarynx normal  Neck:   general inspection and Thyroid normal.  No abnormal cervical or supraclavicular nodes    Lungs:   clear to auscultation, good respiratory effort  Heart: Ausculation normal.  Regular rhythm. No cardiac murmurs. No carotid bruits or palpable thrills  Chest wall normal  Abd:  benign  Extremities:   +1 edema, good peripheral pulses  Skin:   Warm to palpation, without rashes, bruising, or suspicious lesions           Assessment:    Patient Active Problem List   Diagnosis Code    Diabetes mellitus without complication ([facility] F40.7    Essential hypertension I10    RAD (reactive airway disease) J45.909    Facial droop R29.810    S/P THAO-BSO Z90.710, Z90.722, Z90.79    H/O colonoscopy Z98.890    OA (osteoarthritis) M19.90    Gout M10.9    Obesity, morbid (HCC) E66.01       Today's diagnoses are:    ICD-10-CM ICD-9-CM    1. Preventative health care Z00.00 V70.0    2. Essential hypertension I10 401.9 amLODIPine (NORVASC) 5 mg tablet    not optimal, avoid diuretics due to gout   3. Diabetes mellitus without complication ([facility] J98.2 250.00 HM DIABETES EYE EXAM      REFERRAL TO OPTOMETRY    3/27/2018:  a1c 8.7/LDL 88/ MAB negative   4. Essential hypertension I10 401.9 amLODIPine (NORVASC) 5 mg tablet    Not at goal at all, adding Amlodipine, discussed dietary changes. 5. Colon cancer screening Z12.11 V76.51 OCCULT BLOOD, IMMUNOASSAY (FIT)      REFERRAL TO GASTROENTEROLOGY   6. Need for shingles vaccine Z23 V04.89     Rx given for shingrix and PCV 13   7. Encounter for screening mammogram for breast cancer Z12.31 V76.12 MICHELLE MAMMO BI SCREENING INCL CAD   8. Screening for depression Z13.89 V79.0 [place] DEPRESSION SCREEN ANNUAL   9. H/O hysterectomy for benign disease Z90.710 V88.01 estradiol (ESTRACE) 0.01 % (0.1 mg/gram) vaginal cream       Plan:  Orders Placed This Encounter    MICHELLE MAMMO BI SCREENING INCL CAD     Standing Status:   Future     Standing Expiration Date:   6/1/2019     Order Specific Question:   Reason for Exam     Answer:   screening    OCCULT BLOOD, IMMUNOASSAY (FIT)    [facility]     Referral Priority:   Routine     Referral Type:   Consultation     Referral Reason:   Specialty Services Required     Referral Location:   [facility]     Referred to Provider:   Nathen Hoffman MD    REFERRAL TO OPTOMETRY     Referral Priority:   Routine     Referral Type:   Consultation     Referral Reason:   Specialty Services Required     Requested Specialty:   Optometry    HM DIABETES EYE EXAM    [place] DEPRESSION SCREEN ANNUAL    losartan (COZAAR) 100 mg tablet     Sig: Take 1 Tab by mouth daily. Dispense:  90 Tab     Refill:  3    amLODIPine (NORVASC) 5 mg tablet     Sig: Take 1 Tab by mouth daily. Dispense:  90 Tab     Refill:  3    metoprolol succinate (TOPROL-XL) 25 mg XL tablet     Sig: Take 1 Tab by mouth daily. Dispense:  90 Tab     Refill:  3    estradiol (ESTRACE) 0.01 % (0.1 mg/gram) vaginal cream     Sig: Use intravaginally twice a week     Dispense:  42.5 g     Refill:  3       See patient instructions  Patient Instructions   Your BP medicines are now as follows:     Losartan 100 mg one pill daily And  Metoprolol 25 mg one pill daily  And  Amlodipine 5 mg daily    I cut amlodipine in half and added metoprolol    Recheck BP in 2 weeks        refresh note:  done    AVS Printed:  done    Diagnoses and all orders for this visit:    1. Preventative health care    2. Essential hypertension  Comments:  not optimal, avoid diuretics due to gout  Orders:  -     amLODIPine (NORVASC) 5 mg tablet; Take 1 Tab by mouth daily. 3. Diabetes mellitus without complication ([facility]  Comments:  3/27/2018:  a1c 8.7/LDL 88/ MAB negative  Orders:  -      DIABETES EYE EXAM  -     REFERRAL TO OPTOMETRY    4. Essential hypertension  Comments:  Not at goal at all, adding Amlodipine, discussed dietary changes. Orders:  -     amLODIPine (NORVASC) 5 mg tablet; Take 1 Tab by mouth daily. 5. Colon cancer screening  -     OCCULT BLOOD, IMMUNOASSAY (FIT)  -     [facility]    6. Need for shingles vaccine  Comments:  Rx given for shingrix and PCV 13    7. Encounter for screening mammogram for breast cancer  -     [facility] MAMMO BI SCREENING INCL CAD; Future    8. Screening for depression  -     [place] DEPRESSION SCREEN ANNUAL    9. H/O hysterectomy for benign disease  -     estradiol (ESTRACE) 0.01 % (0.1 mg/gram) vaginal cream; Use intravaginally twice a week    Other orders  -     losartan (COZAAR) 100 mg tablet; Take 1 Tab by mouth daily. -     metoprolol succinate (TOPROL-XL) 25 mg XL tablet; Take 1 Tab by mouth daily.

## 2018-05-01 NOTE — PATIENT INSTRUCTIONS
Your BP medicines are now as follows:     Losartan 100 mg one pill daily   And  Metoprolol 25 mg one pill daily  And  Amlodipine 5 mg daily    I cut amlodipine in half and added metoprolol    Recheck BP in 2 weeks

## 2018-05-01 NOTE — MR AVS SNAPSHOT
[address] 
[phone] Patient: Naren Ireland MRN: [id number] Visit Information Date & Time Provider Department Dept. Phone Encounter #  
 5/1/2018  9:40 AM Lissa Scott MD [address] [place] [address] [phone] [id number] Upcoming Health Maintenance Date Due  
 EYE EXAM RETINAL OR DILATED Q1 2/17/1965 FOBT Q 1 YEAR AGE 50-75 10/1/2016 Influenza Age 5 to Adult 8/1/2018 FOOT EXAM Q1 8/17/2018 MICROALBUMIN Q1 8/17/2018 LIPID PANEL Q1 8/17/2018 HEMOGLOBIN A1C Q6M 9/27/2018 PAP AKA CERVICAL CYTOLOGY 10/1/2018 BREAST CANCER SCRN MAMMOGRAM 8/17/2019 DTaP/Tdap/Td series (2 - Td) 9/8/2025 Allergies as of 5/1/2018  Review Complete On: 5/1/2018 By: Lissa Scott MD  
  
 Severity Noted Reaction Type Reactions Codeine  09/02/2015    Nausea and Vomiting Tramadol  12/30/2015    Nausea and Vomiting Current Immunizations  Reviewed on 3/27/2018 Name Date Influenza Vaccine 11/6/2017, 9/10/2015 Influenza Vaccine (Quad) PF 11/23/2016 Pneumococcal Polysaccharide (PPSV-23) 8/19/2016 Tdap 9/8/2015  9:07 AM  
  
 Not reviewed this visit You Were Diagnosed With   
  
 Codes Comments Essential hypertension    -  Primary ICD-10-CM: I10 
ICD-9-CM: 401.9 not optimal, avoid diuretics due to gout Diabetes [facility]     RXA-70-IE: E11.9 ICD-9-CM: 250.00 3/27/2018:  a1c 8.7/LDL 88/ MAB negative Essential hypertension     ICD-10-CM: I10 
ICD-9-CM: 401.9 Not at goal at all, adding Amlodipine, discussed dietary changes. Vitals BP Pulse Temp Resp Height(growth percentile) Weight(growth percentile) 150/71 (BP 1 Location: Left arm, BP Patient Position: Sitting) 80 97.8 °F (36.6 °C) (Oral) 18 5' 3\" (1.6 m) 231 lb (104.8 kg) LMP SpO2 BMI OB Status Smoking Status 09/02/2007 100% 40.92 kg/m2 Hysterectomy Never Smoker Vitals History BMI and BSA Data Body Mass Index Body Surface Area 40.92 kg/m 2 2.16 m 2 Preferred Pharmacy Pharmacy Name Phone [id number] [facility] [address] [phone] Your Updated Medication List  
  
   
This list is accurate as of 5/1/18 10:20 AM.  Always use your most recent med list.  
  
  
  
  
 albuterol 90 mcg/actuation inhaler Commonly known as:  PROVENTIL HFA, VENTOLIN HFA, PROAIR HFA Take 2 Puffs by inhalation every four (4) hours as needed for Wheezing. amLODIPine 5 mg tablet Commonly known as:  Welch Raddle Take 1 Tab by mouth daily. Blood-Glucose Meter monitoring kit Use prn for glucose checks at variety of times, fasting, 2hr pp, bedtime Diagnosis code: E 11.65  
  
 fluticasone-vilanterol 100-25 mcg/dose inhaler Commonly known as:  BREO ELLIPTA Take 1 Puff by inhalation daily. glipiZIDE 10 mg tablet Commonly known as:  GLUCOTROL  
TAKE 1 TAB BY MOUTH TWO (2) TIMES A DAY. glucose blood VI test strips strip Commonly known as:  ONETOUCH ULTRA TEST Test 4 times a day. losartan 100 mg tablet Commonly known as:  COZAAR Take 1 Tab by mouth daily. lovastatin 40 mg tablet Commonly known as:  MEVACOR Take 1 Tab by mouth nightly. metFORMIN 1,000 mg tablet Commonly known as:  GLUCOPHAGE Take 1 Tab by mouth two (2) times daily (with meals). metoprolol succinate 25 mg XL tablet Commonly known as:  TOPROL-XL Take 1 Tab by mouth daily. montelukast 10 mg tablet Commonly known as:  SINGULAIR Take 1 Tab by mouth daily. Prescriptions Sent to Pharmacy Refills  
 losartan (COZAAR) 100 mg tablet 3 Sig: Take 1 Tab by mouth daily. Class: Normal  
 Pharmacy: [address] Ph #: [phone]  Route: Oral  
 amLODIPine (NORVASC) 5 mg tablet 3 Sig: Take 1 Tab by mouth daily. Class: Normal  
 Pharmacy: [address] [place] [address] Ph #: [phone] Route: Oral  
 metoprolol succinate (TOPROL-XL) 25 mg XL tablet 3 Sig: Take 1 Tab by mouth daily. Class: Normal  
 Pharmacy: [address] [place] [address] Ph #: [phone] Route: Oral  
  
Patient Instructions Your BP medicines are now as follows: Losartan 100 mg one pill daily   And 
Metoprolol 25 mg one pill daily  And 
Amlodipine 5 mg daily I cut amlodipine in half and added metoprolol Recheck BP in 2 weeks Introducing [address]! [facility] introduces [employer] patient portal. Now you can access parts of your medical record, email your doctor's office, and request medication refills online. 1. In your internet browser, go to [email or web address] 2. Click on the First Time User? Click Here link in the Sign In box. You will see the New Member Sign Up page. 3. Enter your [employer] Access Code exactly as it appears below. You will not need to use this code after youve completed the sign-up process. If you do not sign up before the expiration date, you must request a new code. · [employer] Access Code: [email or web address] Expires: 6/25/2018  1:47 PM 
 
4. Enter the last four digits of your Social Security Number (xxxx) and Date of Birth (mm/dd/yyyy) as indicated and click Submit. You will be taken to the next sign-up page. 5. Create a [employer] ID. This will be your [employer] login ID and cannot be changed, so think of one that is secure and easy to remember. 6. Create a [employer] password. You can change your password at any time. 7. Enter your Password Reset Question and Answer. This can be used at a later time if you forget your password. 8. Enter your e-mail address.  You will receive e-mail notification when new information is available in [employer]. 9. Click Sign Up. You can now view and download portions of your medical record. 10. Click the Download Summary menu link to download a portable copy of your medical information. If you have questions, please visit the Frequently Asked Questions section of the [employer] website. Remember, [employer] is NOT to be used for urgent needs. For medical emergencies, dial 911. Now available from your iPhone and Android! Please provide this summary of care documentation to your next provider. Your primary care clinician is listed as Vidhya Ghosh. If you have any questions after today's visit, please call [phone].

## 2018-05-17 ENCOUNTER — OFFICE VISIT (OUTPATIENT)
Dept: FAMILY MEDICINE CLINIC | Age: 63
End: 2018-05-17

## 2018-05-17 VITALS
TEMPERATURE: 98.4 F | WEIGHT: 233 LBS | BODY MASS INDEX: 41.29 KG/M2 | HEIGHT: 63 IN | DIASTOLIC BLOOD PRESSURE: 82 MMHG | HEART RATE: 73 BPM | OXYGEN SATURATION: 100 % | SYSTOLIC BLOOD PRESSURE: 150 MMHG | RESPIRATION RATE: 18 BRPM

## 2018-05-17 DIAGNOSIS — M25.569 KNEE PAIN, UNSPECIFIED CHRONICITY, UNSPECIFIED LATERALITY: ICD-10-CM

## 2018-05-17 DIAGNOSIS — I10 ESSENTIAL HYPERTENSION: Primary | ICD-10-CM

## 2018-05-17 DIAGNOSIS — R60.9 EDEMA, UNSPECIFIED TYPE: ICD-10-CM

## 2018-05-17 RX ORDER — METOPROLOL SUCCINATE 50 MG/1
50 TABLET, EXTENDED RELEASE ORAL DAILY
Qty: 90 TAB | Refills: 3
Start: 2018-05-17 | End: 2018-11-27

## 2018-05-17 NOTE — PROGRESS NOTES
Chief Complaint   Patient presents with    Hypertension     follow up on hypertension      1. Have you been to the ER, urgent care clinic since your last visit? Hospitalized since your last visit? No    2. Have you seen or consulted any other health care providers outside of the [address] [place] [address] since your last visit? Include any pap smears or colon screening. No     Reviewed record in preparation for visit and have obtained necessary documentation.

## 2018-05-17 NOTE — PATIENT INSTRUCTIONS
Increase metoprolol to 50 mg daily (you may take two of the 25 mg pills until done).   Continue losartan 100 mg and amlodipine 5 mg    Obtain xrays    Avoid motrin type medicines    Use tylenol for pain    BP check in one month

## 2018-05-17 NOTE — MR AVS SNAPSHOT
[address] [id number] Patient: Jenny Cuevas MRN: [id number] ZCP:8/23/0999 Visit Information Date & Time Provider Department Dept. Phone Encounter #  
 5/17/2018  8:20 AM Elvira Hendrix MD [address] [place] [address] [phone] [id number] Upcoming Health Maintenance Date Due  
 EYE EXAM RETINAL OR DILATED Q1 5/1/2019* FOBT Q 1 YEAR AGE 50-75 5/1/2019* Influenza Age 5 to Adult 8/1/2018 FOOT EXAM Q1 8/17/2018 MICROALBUMIN Q1 8/17/2018 LIPID PANEL Q1 8/17/2018 HEMOGLOBIN A1C Q6M 9/27/2018 PAP AKA CERVICAL CYTOLOGY 10/1/2018 BREAST CANCER SCRN MAMMOGRAM 8/17/2019 DTaP/Tdap/Td series (2 - Td) 9/8/2025 *Topic was postponed. The date shown is not the original due date. Allergies as of 5/17/2018  Review Complete On: 5/17/2018 By: Coby Cheng LPN Severity Noted Reaction Type Reactions Codeine  09/02/2015    Nausea and Vomiting Tramadol  12/30/2015    Nausea and Vomiting Current Immunizations  Reviewed on 3/27/2018 Name Date Influenza Vaccine 11/6/2017, 9/10/2015 Influenza Vaccine (Quad) PF 11/23/2016 Pneumococcal Polysaccharide (PPSV-23) 8/19/2016 Tdap 9/8/2015  9:07 AM  
  
 Not reviewed this visit You Were Diagnosed With   
  
 Codes Comments Essential hypertension    -  Primary ICD-10-CM: I10 
ICD-9-CM: 401.9 not optimal, adjust Rx Edema, unspecified type     ICD-10-CM: R60.9 ICD-9-CM: 782.3 Knee pain, unspecified chronicity, unspecified laterality     ICD-10-CM: M25.569 ICD-9-CM: 719.46 Vitals BP Pulse Temp Resp Height(growth percentile) Weight(growth percentile) 150/82 (BP 1 Location: Left arm, BP Patient Position: Sitting) 73 98.4 °F (36.9 °C) (Oral) 18 5' 3\" (1.6 m) 233 lb (105.7 kg) LMP SpO2 BMI OB Status Smoking Status 09/02/2007 100% 41.27 kg/m2 Hysterectomy Never Smoker Vitals History BMI and BSA Data Body Mass Index Body Surface Area  
 41.27 kg/m 2 2.17 m 2 Preferred Pharmacy Pharmacy Name Phone [facility]ARMAC[facility][id number]- [place], [address]. AT [facility] [phone] Your Updated Medication List  
  
   
This list is accurate as of 5/17/18  9:09 AM.  Always use your most recent med list.  
  
  
  
  
 albuterol 90 mcg/actuation inhaler Commonly known as:  PROVENTIL HFA, VENTOLIN HFA, PROAIR HFA Take 2 Puffs by inhalation every four (4) hours as needed for Wheezing. amLODIPine 5 mg tablet Commonly known as:  Meeker Bars Take 1 Tab by mouth daily. Blood-Glucose Meter monitoring kit Use prn for glucose checks at variety of times, fasting, 2hr pp, bedtime Diagnosis code: E 11.65  
  
 estradiol 0.01 % (0.1 mg/gram) vaginal cream  
Commonly known as:  ESTRACE Use intravaginally twice a week  
  
 fluticasone-vilanterol 100-25 mcg/dose inhaler Commonly known as:  BREO ELLIPTA Take 1 Puff by inhalation daily. glipiZIDE 10 mg tablet Commonly known as:  GLUCOTROL  
TAKE 1 TAB BY MOUTH TWO (2) TIMES A DAY. glucose blood VI test strips strip Commonly known as:  ONETOUCH ULTRA TEST Test 4 times a day. losartan 100 mg tablet Commonly known as:  COZAAR Take 1 Tab by mouth daily. lovastatin 40 mg tablet Commonly known as:  MEVACOR Take 1 Tab by mouth nightly. metFORMIN 1,000 mg tablet Commonly known as:  GLUCOPHAGE Take 1 Tab by mouth two (2) times daily (with meals). metoprolol succinate 25 mg XL tablet Commonly known as:  TOPROL-XL Take 1 Tab by mouth daily. montelukast 10 mg tablet Commonly known as:  SINGULAIR Take 1 Tab by mouth daily. To-Do List   
 05/17/2018 Imaging:  XR KNEE LT 3 V   
  
 05/17/2018 Imaging:  XR KNEE RT 3 V Patient Instructions Increase metoprolol to 50 mg daily (you may take two of the 25 mg pills until done). Continue losartan 100 mg and amlodipine 5 mg Obtain xrays Avoid motrin type medicines Use tylenol for pain Introducing [facility]! [employer] introduces [employer] patient portal. Now you can access parts of your medical record, email your doctor's office, and request medication refills online. 1. In your internet browser, go to [email or web address] 2. Click on the First Time User? Click Here link in the Sign In box. You will see the New Member Sign Up page. 3. Enter your [employer] Access Code exactly as it appears below. You will not need to use this code after youve completed the sign-up process. If you do not sign up before the expiration date, you must request a new code. · [employer] Access Code: [email or web address] Expires: 6/25/2018  1:47 PM 
 
4. Enter the last four digits of your Social Security Number (xxxx) and Date of Birth (mm/dd/yyyy) as indicated and click Submit. You will be taken to the next sign-up page. 5. Create a [employer] ID. This will be your [employer] login ID and cannot be changed, so think of one that is secure and easy to remember. 6. Create a [employer] password. You can change your password at any time. 7. Enter your Password Reset Question and Answer. This can be used at a later time if you forget your password. 8. Enter your e-mail address. You will receive e-mail notification when new information is available in [address] 9. Click Sign Up. You can now view and download portions of your medical record. 10. Click the Download Summary menu link to download a portable copy of your medical information. If you have questions, please visit the Frequently Asked Questions section of the [employer] website. Remember, [employer] is NOT to be used for urgent needs. For medical emergencies, dial 911. Now available from your iPhone and Android! Please provide this summary of care documentation to your next provider. Your primary care clinician is listed as Ulysses Mujica. If you have any questions after today's visit, please call [phone].

## 2018-05-17 NOTE — PROGRESS NOTES
Greater than 50% of today's 15 minute visit was counseling or coordination of care for the following reasons:    See diagnoses and orders, see patient instructions    Last visit:  Your BP medicines are now as follows:     Losartan 100 mg one pill daily   And  Metoprolol 25 mg one pill daily  And  Amlodipine 5 mg daily     I cut amlodipine in half and added metoprolol     Recheck BP in 2 weeks    Today:  /82    ROS:  The patient denies:  Headches/Chest Pain/SOB/fevers/falls    Any positive ROS include: knee pains    Patient's last menstrual period was 09/02/2007.     Physical Exam  Visit Vitals    /82 (BP 1 Location: Left arm, BP Patient Position: Sitting)    Pulse 73    Temp 98.4 °F (36.9 °C) (Oral)    Resp 18    Ht 5' 3\" (1.6 m)    Wt 233 lb (105.7 kg)    LMP 09/02/2007    SpO2 100%    BMI 41.27 kg/m2     BP Readings from Last 3 Encounters:   05/17/18 150/82   05/01/18 150/71   04/18/18 167/80       Constitutional:  Appears well,  No Acute Distress, Vitals noted  Psychiatric:   Affect normal, Alert and Oriented to person/place/time/situation, appropriately conversational     Extremities:  + edema, good peripheral pulses  Skin:   Warm to palpation, without rashes, bruising, or suspicious lesions   MSK:  Tender superior right patellar tendon

## 2018-05-19 NOTE — MR AVS SNAPSHOT
[address] [facility] [address] [facility] 
[phone] Patient: Rodrigo Fisher MRN: [id number] HAB:7/35/6613 Visit Information Date & Time Provider Department Dept. Phone Encounter #  
 5/19/2018 10:30 AM Gino Garcia [facility] [phone] [id number] Your Appointments 5/19/2018 10:30 AM  
XRAY with Gino Garcia MD  
[address] [place] [address] [facility] Appt Note: xray [address] [facility],[place] [address] [facility]  
[phone]  
  
   
 [address] [place] [address] [facility] [phone] Upcoming Health Maintenance Date Due  
 EYE EXAM RETINAL OR DILATED Q1 5/1/2019* FOBT Q 1 YEAR AGE 50-75 5/1/2019* Influenza Age 5 to Adult 8/1/2018 FOOT EXAM Q1 8/17/2018 MICROALBUMIN Q1 8/17/2018 LIPID PANEL Q1 8/17/2018 HEMOGLOBIN A1C Q6M 9/27/2018 PAP AKA CERVICAL CYTOLOGY 10/1/2018 BREAST CANCER SCRN MAMMOGRAM 8/17/2019 DTaP/Tdap/Td series (2 - Td) 9/8/2025 *Topic was postponed. The date shown is not the original due date. Allergies as of 5/19/2018  Review Complete On: 5/17/2018 By: Antwan Jones LPN Severity Noted Reaction Type Reactions Codeine  09/02/2015    Nausea and Vomiting Tramadol  12/30/2015    Nausea and Vomiting Current Immunizations  Reviewed on 3/27/2018 Name Date Influenza Vaccine 11/6/2017, 9/10/2015 Influenza Vaccine (Quad) PF 11/23/2016 Pneumococcal Polysaccharide (PPSV-23) 8/19/2016 Tdap 9/8/2015  9:07 AM  
  
 Not reviewed this visit Vitals LMP OB Status Smoking Status 09/02/2007 Hysterectomy Never Smoker Your Updated Medication List  
  
   
This list is accurate as of 5/19/18 10:01 AM.  Always use your most recent med list.  
  
  
  
  
 albuterol 90 mcg/actuation inhaler Commonly known as:  PROVENTIL HFA, VENTOLIN HFA, PROAIR HFA  
 Take 2 Puffs by inhalation every four (4) hours as needed for Wheezing. amLODIPine 5 mg tablet Commonly known as:  Reji Jesus Take 1 Tab by mouth daily. Blood-Glucose Meter monitoring kit Use prn for glucose checks at variety of times, fasting, 2hr pp, bedtime Diagnosis code: E 11.65  
  
 estradiol 0.01 % (0.1 mg/gram) vaginal cream  
Commonly known as:  ESTRACE Use intravaginally twice a week  
  
 fluticasone-vilanterol 100-25 mcg/dose inhaler Commonly known as:  BREO ELLIPTA Take 1 Puff by inhalation daily. glipiZIDE 10 mg tablet Commonly known as:  GLUCOTROL  
TAKE 1 TAB BY MOUTH TWO (2) TIMES A DAY. glucose blood VI test strips strip Commonly known as:  ONETOUCH ULTRA TEST Test 4 times a day. losartan 100 mg tablet Commonly known as:  COZAAR Take 1 Tab by mouth daily. lovastatin 40 mg tablet Commonly known as:  MEVACOR Take 1 Tab by mouth nightly. metFORMIN 1,000 mg tablet Commonly known as:  GLUCOPHAGE Take 1 Tab by mouth two (2) times daily (with meals). metoprolol succinate 50 mg XL tablet Commonly known as:  TOPROL-XL Take 1 Tab by mouth daily. montelukast 10 mg tablet Commonly known as:  SINGULAIR Take 1 Tab by mouth daily. Introducing \[facility]\"" & [facility]! [employer] introduces [employer] patient portal. Now you can access parts of your medical record, email your doctor's office, and request medication refills online. 1. In your internet browser, go to [email or web address] 2. Click on the First Time User? Click Here link in the Sign In box. You will see the New Member Sign Up page. 3. Enter your [employer] Access Code exactly as it appears below. You will not need to use this code after youve completed the sign-up process. If you do not sign up before the expiration date, you must request a new code. · [employer] Access Code: [email or web address] Expires: 6/25/2018  1:47 PM 
 
 4. Enter the last four digits of your Social Security Number (xxxx) and Date of Birth (mm/dd/yyyy) as indicated and click Submit. You will be taken to the next sign-up page. 5. Create a [employer] ID. This will be your [employer] login ID and cannot be changed, so think of one that is secure and easy to remember. 6. Create a [employer] password. You can change your password at any time. 7. Enter your Password Reset Question and Answer. This can be used at a later time if you forget your password. 8. Enter your e-mail address. You will receive e-mail notification when new information is available in [address] 9. Click Sign Up. You can now view and download portions of your medical record. 10. Click the Download Summary menu link to download a portable copy of your medical information. If you have questions, please visit the Frequently Asked Questions section of the [employer] website. Remember, [employer] is NOT to be used for urgent needs. For medical emergencies, dial 911. Now available from your iPhone and Android! Please provide this summary of care documentation to your next provider. Your primary care clinician is listed as Jhon Love. If you have any questions after today's visit, please call [phone].

## 2018-05-22 DIAGNOSIS — M17.9 OSTEOARTHRITIS OF KNEE, UNSPECIFIED LATERALITY, UNSPECIFIED OSTEOARTHRITIS TYPE: Primary | ICD-10-CM

## 2018-05-22 RX ORDER — DICLOFENAC SODIUM 10 MG/G
2 GEL TOPICAL 4 TIMES DAILY
Qty: 100 G | Refills: 11 | Status: SHIPPED | OUTPATIENT
Start: 2018-05-22 | End: 2018-11-27

## 2018-05-31 ENCOUNTER — TELEPHONE (OUTPATIENT)
Dept: FAMILY MEDICINE CLINIC | Age: 63
End: 2018-05-31

## 2018-05-31 NOTE — TELEPHONE ENCOUNTER
This message is to inform you we have attempted to reach out to your patient several times and have not been able to make contact and the patient has not responded to our messages. It is up to you to determine if you or your nurse needs to attempt to contact the patient to discuss the importance of an appointment with the specialist.  If you speak with the patient please make them aware they need to contact the office with appointment information including the date, time, doctors first and last name and the phone number to that office as soon as an appointment is made. If you receive appointment information please add to this telephone encounter and include all of the previously discussed information and route this message back to me. Please do not place a new order! I can reopen and edit the existing order.   OPTOMETRY order  Gastroenterology order

## 2018-08-06 ENCOUNTER — TELEPHONE (OUTPATIENT)
Dept: FAMILY MEDICINE CLINIC | Age: 63
End: 2018-08-06

## 2018-08-06 NOTE — TELEPHONE ENCOUNTER
----- Message from Bria Kothari sent at 8/6/2018  3:18 PM EDT -----  Regarding: Dr. Melisa Mckinney / Jessie Briscoe  Pt is completely out of her medication and has lost her job and is looking to have the doctor call in medication that is not too expensive. Pt stated it is all her medications listed in her chart.   [facility]: [phone]  Fax: pt unsure  Pt's best contact: [phone] and [phone]

## 2018-08-08 NOTE — TELEPHONE ENCOUNTER
[facility] stated patient picked up medications early July 2018 for a 3 month supply, advised patient to call [employer] for $ 4 dollar list of medications.

## 2018-08-13 ENCOUNTER — PATIENT OUTREACH (OUTPATIENT)
Dept: CASE MANAGEMENT | Age: 63
End: 2018-08-13

## 2018-08-18 DIAGNOSIS — E11.9 DIABETES MELLITUS WITHOUT COMPLICATION (HCC): ICD-10-CM

## 2018-08-19 RX ORDER — LOVASTATIN 40 MG/1
TABLET ORAL
Qty: 90 TAB | Refills: 3 | Status: SHIPPED | OUTPATIENT
Start: 2018-08-19 | End: 2018-11-27

## 2018-08-19 RX ORDER — GLIPIZIDE 10 MG/1
TABLET ORAL
Qty: 180 TAB | Refills: 2 | Status: SHIPPED | OUTPATIENT
Start: 2018-08-19 | End: 2018-11-27

## 2018-11-27 ENCOUNTER — OFFICE VISIT (OUTPATIENT)
Dept: FAMILY MEDICINE CLINIC | Age: 63
End: 2018-11-27

## 2018-11-27 VITALS
HEART RATE: 74 BPM | WEIGHT: 238 LBS | OXYGEN SATURATION: 100 % | HEIGHT: 63 IN | DIASTOLIC BLOOD PRESSURE: 89 MMHG | SYSTOLIC BLOOD PRESSURE: 168 MMHG | TEMPERATURE: 98.1 F | BODY MASS INDEX: 42.17 KG/M2 | RESPIRATION RATE: 20 BRPM

## 2018-11-27 DIAGNOSIS — F43.21 GRIEF: ICD-10-CM

## 2018-11-27 DIAGNOSIS — I10 ESSENTIAL HYPERTENSION: ICD-10-CM

## 2018-11-27 DIAGNOSIS — J45.901 RAD (REACTIVE AIRWAY DISEASE), UNSPECIFIED ASTHMA SEVERITY, WITH ACUTE EXACERBATION: ICD-10-CM

## 2018-11-27 DIAGNOSIS — E11.9 DIABETES MELLITUS WITHOUT COMPLICATION (HCC): Primary | ICD-10-CM

## 2018-11-27 DIAGNOSIS — Z23 ENCOUNTER FOR IMMUNIZATION: ICD-10-CM

## 2018-11-27 DIAGNOSIS — J45.40 MODERATE PERSISTENT ASTHMA WITHOUT COMPLICATION: ICD-10-CM

## 2018-11-27 DIAGNOSIS — E66.01 OBESITY, MORBID (HCC): ICD-10-CM

## 2018-11-27 DIAGNOSIS — Z59.9 FINANCIAL DIFFICULTIES: ICD-10-CM

## 2018-11-27 LAB
ALBUMIN UR QL STRIP: 30 MG/L
CREATININE, URINE POC: 300 MG/DL
MICROALBUMIN/CREAT RATIO POC: <30 MG/G

## 2018-11-27 RX ORDER — LOSARTAN POTASSIUM 100 MG/1
100 TABLET ORAL DAILY
Qty: 90 TAB | Refills: 3 | Status: SHIPPED | OUTPATIENT
Start: 2018-11-27 | End: 2019-11-14 | Stop reason: SDUPTHER

## 2018-11-27 RX ORDER — AMLODIPINE BESYLATE 5 MG/1
5 TABLET ORAL DAILY
Qty: 90 TAB | Refills: 3 | Status: SHIPPED | OUTPATIENT
Start: 2018-11-27 | End: 2018-12-11

## 2018-11-27 RX ORDER — ALBUTEROL SULFATE 90 UG/1
2 AEROSOL, METERED RESPIRATORY (INHALATION)
Qty: 1 INHALER | Refills: 3 | Status: SHIPPED | OUTPATIENT
Start: 2018-11-27 | End: 2019-05-06 | Stop reason: SDUPTHER

## 2018-11-27 RX ORDER — SIMVASTATIN 40 MG/1
40 TABLET, FILM COATED ORAL
Qty: 90 TAB | Refills: 3 | Status: SHIPPED | OUTPATIENT
Start: 2018-11-27 | End: 2019-11-14 | Stop reason: SDUPTHER

## 2018-11-27 RX ORDER — METFORMIN HYDROCHLORIDE 1000 MG/1
1000 TABLET ORAL 2 TIMES DAILY WITH MEALS
Qty: 180 TAB | Refills: 3 | Status: SHIPPED | OUTPATIENT
Start: 2018-11-27 | End: 2019-11-14 | Stop reason: SDUPTHER

## 2018-11-27 RX ORDER — GLIMEPIRIDE 2 MG/1
2 TABLET ORAL
Qty: 90 TAB | Refills: 3 | Status: SHIPPED | OUTPATIENT
Start: 2018-11-27 | End: 2019-03-15 | Stop reason: DRUGHIGH

## 2018-11-27 RX ORDER — METOPROLOL TARTRATE 25 MG/1
25 TABLET, FILM COATED ORAL 2 TIMES DAILY
Qty: 180 TAB | Refills: 3 | Status: SHIPPED | OUTPATIENT
Start: 2018-11-27 | End: 2019-11-22 | Stop reason: SDUPTHER

## 2018-11-27 RX ORDER — MONTELUKAST SODIUM 10 MG/1
10 TABLET ORAL DAILY
Qty: 90 TAB | Refills: 3 | Status: SHIPPED | OUTPATIENT
Start: 2018-11-27 | End: 2020-02-14 | Stop reason: SDUPTHER

## 2018-11-27 SDOH — ECONOMIC STABILITY - INCOME SECURITY: PROBLEM RELATED TO HOUSING AND ECONOMIC CIRCUMSTANCES, UNSPECIFIED: Z59.9

## 2018-11-27 NOTE — PROGRESS NOTES
Chief Complaint   Patient presents with    Hypertension     discuss BP medication      1. Have you been to the ER, urgent care clinic since your last visit? Hospitalized since your last visit? No    2. Have you seen or consulted any other health care providers outside of the [address] [place] [address] since your last visit? Include any pap smears or colon screening. No     Reviewed record in preparation for visit and have obtained necessary documentation.

## 2018-11-27 NOTE — PROGRESS NOTES
Marcellus Gamez is a 61 y.o. female      Issues discussed today include:    She lost insurance and cannot afford her Rx. Will use the [facility] low cost formulary and change some of her Rx to save money. See orders    She is under stress grieving her sister who  2018    Data reviewed or ordered today:  Labs, flu vaccine today, she declines Shingrix for cost    Other problems include:  Patient Active Problem List   Diagnosis Code    [facility] U08.7    Essential hypertension I10    RAD (reactive airway disease) J45.909    Facial droop R29.810    S/P THAO-BSO Z90.710, Z90.722, Z90.79    H/O colonoscopy Z98.890    OA (osteoarthritis) M19.90    Gout M10.9    Obesity, morbid (HCC) E66.01       Medications:  Current Outpatient Medications   Medication Sig Dispense Refill    lovastatin (MEVACOR) 40 mg tablet TAKE 1 TAB BY MOUTH NIGHTLY. 90 Tab 3    glipiZIDE (GLUCOTROL) 10 mg tablet TAKE 1 TAB BY MOUTH TWO (2) TIMES A DAY. 180 Tab 2    diclofenac (VOLTAREN) 1 % gel Apply 2 g to affected area four (4) times daily. 100 g 11    metoprolol succinate (TOPROL-XL) 50 mg XL tablet Take 1 Tab by mouth daily. 90 Tab 3    losartan (COZAAR) 100 mg tablet Take 1 Tab by mouth daily. 90 Tab 3    amLODIPine (NORVASC) 5 mg tablet Take 1 Tab by mouth daily. 90 Tab 3    metFORMIN (GLUCOPHAGE) 1,000 mg tablet Take 1 Tab by mouth two (2) times daily (with meals). 60 Tab 2    fluticasone-vilanterol (BREO ELLIPTA) 100-25 mcg/dose inhaler Take 1 Puff by inhalation daily. 3 Inhaler 3    albuterol (PROVENTIL HFA, VENTOLIN HFA, PROAIR HFA) 90 mcg/actuation inhaler Take 2 Puffs by inhalation every four (4) hours as needed for Wheezing. 1 Inhaler 3    montelukast (SINGULAIR) 10 mg tablet Take 1 Tab by mouth daily.  90 Tab 3    estradiol (ESTRACE) 0.01 % (0.1 mg/gram) vaginal cream Use intravaginally twice a week 42.5 g 3    glucose blood VI test strips (ONETOUCH ULTRA TEST) strip Test 4 times a day. 100 Strip 2    Blood-Glucose Meter monitoring kit Use prn for glucose checks at variety of times, fasting, 2hr pp, bedtime Diagnosis code: E 11.65 1 Kit 1       Allergies: Allergies   Allergen Reactions    Codeine Nausea and Vomiting    Tramadol Nausea and Vomiting       LMP:  Patient's last menstrual period was 09/02/2007. Social History     Socioeconomic History    Marital status: SINGLE     Spouse name: Not on file    Number of children: Not on file    Years of education: Not on file    Highest education level: Not on file   Social Needs    Financial resource strain: Not on file    Food insecurity - worry: Not on file    Food insecurity - inability: Not on file    Transportation needs - medical: Not on file   Social [employer] needs - non-medical: Not on file   Occupational History    Not on file   Tobacco Use    Smoking status: Never Smoker    Smokeless tobacco: Never Used   Substance and Sexual Activity    Alcohol use: No    Drug use: No    Sexual activity: No   Other Topics Concern    Not on file   Social History Narrative    Not on file         Family History   Problem Relation Age of Onset    Diabetes Father     Diabetes Sister      Other family history:  Lost sister to stroke 11/1/2018    Meaningful use:  done      ROS:  Headaches:  no  Chest Pain:  no  SOB:  no  Fevers:  no  Falls:  no  anxiety/depression/losing interest in doing things that were previously enjoyed:  no. PHQ2 = 1, PHQ9 =3  Other significant ROS:  Lost insurance. Cost is an issue for Rx    Patient's last menstrual period was 09/02/2007.     Physical Exam  Visit Vitals  BP (!) 182/96 (BP 1 Location: Left arm, BP Patient Position: Sitting)   Pulse 92   Temp 98.1 °F (36.7 °C) (Oral)   Resp 20   Ht 5' 3\" (1.6 m)   Wt 238 lb (108 kg)   LMP 09/02/2007   SpO2 100%   BMI 42.16 kg/m²     BP Readings from Last 3 Encounters:   11/27/18 (!) 182/96   05/17/18 150/82   05/01/18 150/71     Constitutional:  Appears well,  No Acute Distress, Vitals noted  Psychiatric:   Affect normal, Alert and cooperative, Oriented to person/place/time    Eyes:   Pupils equally round and reactive, EOMI, conjunctiva clear, eyelids normal  ENT:   External ears and nose normal/lips, teeth=OK/gums normal, TMs and Orophyarynx normal.  Stable facial droop  Neck:   general inspection and Thyroid normal.  No abnormal cervical or supraclavicular nodes    Lungs:   clear to auscultation, good respiratory effort  Heart: Ausculation normal.  Regular rhythm. No cardiac murmurs. No carotid bruits or palpable thrills  Chest wall normal  Abdominal exam:   normal.  Liver and spleen normal.  No bruits/masses/tenderness    Extremities:   without edema, good peripheral pulses  Skin:   Warm to palpation, without rashes, bruising, or suspicious lesions     Diabetic foot exam:      Sensation by vibration sense:  good  Monofilament test:  good  Skin integrity:  intact without lesions  Vascular:  good circulation  Motor:  moves all toes, strength seems ok  No onychomycosis        Assessment:    Patient Active Problem List   Diagnosis Code    Diabetes mellitus without complication ([facility]) F98.9    Essential hypertension I10    RAD (reactive airway disease) J45.909    Facial droop R29.810    S/P THAO-BSO Z90.710, Z90.722, Z90.79    H/O colonoscopy Z98.890    OA (osteoarthritis) M19.90    Gout M10.9    Obesity, morbid ([facility]) E66.01       Today's diagnoses are:  Diagnoses and all orders for this visit:    1. Moderate persistent asthma without complication  -     montelukast (SINGULAIR) 10 mg tablet; Take 1 Tab by mouth daily. For asthma prevention    2. Bronchitis    3. Essential hypertension  Comments:  not optimal, avoid diuretics due to gout  Orders:  -     amLODIPine (NORVASC) 5 mg tablet; Take 1 Tab by mouth daily. -     losartan (COZAAR) 100 mg tablet; Take 1 Tab by mouth daily. -     metoprolol tartrate (LOPRESSOR) 25 mg tablet;  Take 1 Tab by mouth two (2) times a day.    4. Diabetes mellitus without complication ([facility]  Comments:  Has been a long time since A1c. Rechecking A1c as well as a B12. Increasing Metformin. Assessment & Plan:  Improving, based on history, physical exam and review of pertinent labs, studies and medications; meds reconciled; continue current treatment plan, lifestyle modifications recommended. Key Antihyperglycemic Medications             metFORMIN (GLUCOPHAGE) 1,000 mg tablet  (Taking) Take 1 Tab by mouth two (2) times daily (with meals). glimepiride (AMARYL) 2 mg tablet  (Taking) Take 1 Tab by mouth every morning. Other Key Diabetic Medications             losartan (COZAAR) 100 mg tablet  (Taking) Take 1 Tab by mouth daily. simvastatin (ZOCOR) 40 mg tablet  (Taking) Take 1 Tab by mouth nightly. Lab Results   Component Value Date/Time    Hemoglobin A1c 8.7 03/27/2018 02:01 PM    Glucose 139 08/17/2017 11:16 AM    Creatinine 0.79 08/17/2017 11:16 AM    Microalbumin/creat ratio (POC) <30 08/17/2017 02:46 PM    Cholesterol, total 184 08/17/2017 11:16 AM    HDL Cholesterol 103 08/17/2017 11:16 AM    LDL,Direct 88 08/17/2017 11:16 AM     Diabetic Foot and Eye Exam HM Status   Topic Date Due    Diabetic Foot Care  08/17/2018    Eye Exam  05/01/2019 (Originally 2/17/1965)       Orders:  -     metFORMIN (GLUCOPHAGE) 1,000 mg tablet; Take 1 Tab by mouth two (2) times daily (with meals). -     simvastatin (ZOCOR) 40 mg tablet; Take 1 Tab by mouth nightly.  -     glimepiride (AMARYL) 2 mg tablet; Take 1 Tab by mouth every morning.  -     HEMOGLOBIN A1C WITH EAG  -     AMB POC URINE, MICROALBUMIN, SEMIQUANT (3 RESULTS)  -     METABOLIC PANEL, BASIC  -     ALT  -     LDL, DIRECT    5. RAD (reactive airway disease), unspecified asthma severity, with acute exacerbation  -     albuterol (PROVENTIL HFA, VENTOLIN HFA, PROAIR HFA) 90 mcg/actuation inhaler; Take 2 Puffs by inhalation every four (4) hours as needed for Wheezing.     6. Obesity, morbid [facility])  Assessment & Plan:  Uncontrolled, based on history, physical exam and review of pertinent labs, studies and medications; meds reconciled; continue current treatment plan, lifestyle modifications recommended. Key Obesity Meds             metFORMIN (GLUCOPHAGE) 1,000 mg tablet  (Taking) Take 1 Tab by mouth two (2) times daily (with meals). Lab Results   Component Value Date/Time    Hemoglobin A1c 8.7 03/27/2018 02:01 PM    Glucose 139 08/17/2017 11:16 AM    Cholesterol, total 184 08/17/2017 11:16 AM    HDL Cholesterol 103 08/17/2017 11:16 AM    LDL,Direct 88 08/17/2017 11:16 AM    Sodium 141 08/17/2017 11:16 AM    Potassium 4.2 08/17/2017 11:16 AM    ALT (SGPT) 12 08/17/2017 11:16 AM                 Plan:  No orders of the defined types were placed in this encounter. See patient instructions  There are no Patient Instructions on file for this visit.       Arrange diagnoses:  done    Go down rooming path:      Check meds:  done    AVS Printed:  done

## 2018-11-27 NOTE — ASSESSMENT & PLAN NOTE
Improving, based on history, physical exam and review of pertinent labs, studies and medications; meds reconciled; continue current treatment plan, lifestyle modifications recommended. Key Antihyperglycemic Medications             metFORMIN (GLUCOPHAGE) 1,000 mg tablet  (Taking) Take 1 Tab by mouth two (2) times daily (with meals). glimepiride (AMARYL) 2 mg tablet  (Taking) Take 1 Tab by mouth every morning. Other Key Diabetic Medications             losartan (COZAAR) 100 mg tablet  (Taking) Take 1 Tab by mouth daily. simvastatin (ZOCOR) 40 mg tablet  (Taking) Take 1 Tab by mouth nightly.         Lab Results   Component Value Date/Time    Hemoglobin A1c 8.7 03/27/2018 02:01 PM    Glucose 139 08/17/2017 11:16 AM    Creatinine 0.79 08/17/2017 11:16 AM    Microalbumin/creat ratio (POC) <30 08/17/2017 02:46 PM    Cholesterol, total 184 08/17/2017 11:16 AM    HDL Cholesterol 103 08/17/2017 11:16 AM    LDL,Direct 88 08/17/2017 11:16 AM     Diabetic Foot and Eye Exam HM Status   Topic Date Due    Diabetic Foot Care  08/17/2018    Eye Exam  05/01/2019 (Originally 2/17/1965)

## 2018-11-27 NOTE — ASSESSMENT & PLAN NOTE
Uncontrolled, based on history, physical exam and review of pertinent labs, studies and medications; meds reconciled; continue current treatment plan, lifestyle modifications recommended. Key Obesity Meds             metFORMIN (GLUCOPHAGE) 1,000 mg tablet  (Taking) Take 1 Tab by mouth two (2) times daily (with meals).         Lab Results   Component Value Date/Time    Hemoglobin A1c 8.7 03/27/2018 02:01 PM    Glucose 139 08/17/2017 11:16 AM    Cholesterol, total 184 08/17/2017 11:16 AM    HDL Cholesterol 103 08/17/2017 11:16 AM    LDL,Direct 88 08/17/2017 11:16 AM    Sodium 141 08/17/2017 11:16 AM    Potassium 4.2 08/17/2017 11:16 AM    ALT (SGPT) 12 08/17/2017 11:16 AM

## 2018-11-27 NOTE — PATIENT INSTRUCTIONS
Use [facility] low cost pharmacy list:    Stop lovastatin, start simvastatin one pill daily for cholesterol    Stop glipizide, start glimeperide one pill daily for diabetes    Start montelukast one pill daily for asthma prevention.   Use albuterol as needed    Continue amlodipine and losartan one pill daily for blood pressure    Start metoprolol 25 mg TWICE daily for blood pressure    Continue metformin twice daily for diabetes    Labs today    Follow up in 2 weeks for blood pressure check

## 2018-11-27 NOTE — LETTER
11/27/2018 9:27 AM 
 
Ms. Marcellus Gamez [address] Galdino [address] [address] Body mass index is 42.16 kg/m². Focus on regular exercise (150 minutes each week) and healthy eating. Eat more fruits and vegetables. Eat more protein (egg whites, beans, and nuts you know you tolerate) and less carbohydrates (white bread, white rice, white pasta, white potatoes, sodas, and sweets). Eat appropriately small portion sizes. Obtain eye exam every year. Use [facility] low cost pharmacy. See form. Sincerely, Marice Sicard, MD

## 2018-11-28 LAB
ALT SERPL-CCNC: 6 IU/L (ref 0–32)
BUN SERPL-MCNC: 13 MG/DL (ref 8–27)
BUN/CREAT SERPL: 16 (ref 12–28)
CALCIUM SERPL-MCNC: 9.6 MG/DL (ref 8.7–10.3)
CHLORIDE SERPL-SCNC: 97 MMOL/L (ref 96–106)
CO2 SERPL-SCNC: 25 MMOL/L (ref 20–29)
CREAT SERPL-MCNC: 0.81 MG/DL (ref 0.57–1)
EST. AVERAGE GLUCOSE BLD GHB EST-MCNC: 237 MG/DL
GLUCOSE SERPL-MCNC: 188 MG/DL (ref 65–99)
HBA1C MFR BLD: 9.9 % (ref 4.8–5.6)
LDLC SERPL DIRECT ASSAY-MCNC: 97 MG/DL (ref 0–99)
POTASSIUM SERPL-SCNC: 4.6 MMOL/L (ref 3.5–5.2)
SODIUM SERPL-SCNC: 137 MMOL/L (ref 134–144)

## 2018-11-28 NOTE — PROGRESS NOTES
Your diabetes is poorly controlled. Make the medication changes we discussed 9at [facility]). Focus on regular exercise (150 minutes each week) and healthy eating. Eat more fruits and vegetables. Eat more protein (egg whites, beans, and nuts you know you tolerate) and less carbohydrates (white bread, white rice, white pasta, white potatoes, sodas, and sweets). Eat appropriately small portion sizes. Keep up with your yearly eye exams.     Recheck fasting labs in February 2019

## 2018-12-11 ENCOUNTER — OFFICE VISIT (OUTPATIENT)
Dept: FAMILY MEDICINE CLINIC | Age: 63
End: 2018-12-11

## 2018-12-11 VITALS
HEART RATE: 89 BPM | OXYGEN SATURATION: 100 % | HEIGHT: 63 IN | TEMPERATURE: 99.2 F | RESPIRATION RATE: 16 BRPM | BODY MASS INDEX: 41.64 KG/M2 | DIASTOLIC BLOOD PRESSURE: 78 MMHG | WEIGHT: 235 LBS | SYSTOLIC BLOOD PRESSURE: 166 MMHG

## 2018-12-11 DIAGNOSIS — E11.9 DIABETES MELLITUS WITHOUT COMPLICATION (HCC): Primary | ICD-10-CM

## 2018-12-11 DIAGNOSIS — I10 ESSENTIAL HYPERTENSION: ICD-10-CM

## 2018-12-11 DIAGNOSIS — E11.65 UNCONTROLLED TYPE 2 DIABETES MELLITUS WITH HYPERGLYCEMIA (HCC): ICD-10-CM

## 2018-12-11 DIAGNOSIS — K21.9 GASTROESOPHAGEAL REFLUX DISEASE WITHOUT ESOPHAGITIS: ICD-10-CM

## 2018-12-11 RX ORDER — RANITIDINE 300 MG/1
300 TABLET ORAL DAILY
Qty: 90 TAB | Refills: 1 | Status: SHIPPED | OUTPATIENT
Start: 2018-12-11 | End: 2020-02-14 | Stop reason: ALTCHOICE

## 2018-12-11 RX ORDER — AMLODIPINE BESYLATE 10 MG/1
10 TABLET ORAL DAILY
Qty: 90 TAB | Refills: 3 | Status: SHIPPED | OUTPATIENT
Start: 2018-12-11 | End: 2019-11-14 | Stop reason: SDUPTHER

## 2018-12-11 NOTE — PATIENT INSTRUCTIONS
Keep up with your female exams Fasting labs in February 2019 Keep up with yearly eye exams Increase amlodipine to 10 mg daily

## 2018-12-11 NOTE — PROGRESS NOTES
Greater than 50% of today's 15 minute visit was counseling or coordination of care for the following reasons:   
See diagnoses and orders, see patient instructions Last visit: She lost insurance and cannot afford her Rx. Will use the [facility] low cost formulary and change some of her Rx to save money. See orders 
  She is under stress grieving her sister who  2018 
  
Data reviewed or ordered today:  Labs, flu vaccine today, she declines Shingrix for cost 
 
 
Today:  BP still not optimal 164/82>>>166/78. Will increase amlodipine to 10 mg daily. We reviewed labs (see letter 2018). Recheck fasting labs in February Diabetic foot exam:   
 
Sensation by vibration sense:  good Monofilament test:  good Skin integrity:  intact without lesions Vascular:  good circulation Motor:  moves all toes, strength seems ok 
  +onychomycosis She should see podiatrist once she gets insurance She c/o GERD. Will Rx ranitidine 300 mg for prn use

## 2018-12-11 NOTE — PROGRESS NOTES
1. Have you been to the ER, urgent care clinic since your last visit? Hospitalized since your last visit? No 
 
2. Have you seen or consulted any other health care providers outside of the [address] [place] [address] since your last visit? Include any pap smears or colon screening. No 
 
Chief Complaint Patient presents with  Blood Pressure Check Patient states she is not checking her blood sugars. Blood pressure 164/82, pulse 91, temperature 99.2 °F (37.3 °C), temperature source Oral, resp. rate 16, height 5' 3\" (1.6 m), weight 235 lb (106.6 kg), last menstrual period 09/02/2007, SpO2 100 %.

## 2019-02-27 ENCOUNTER — OFFICE VISIT (OUTPATIENT)
Dept: FAMILY MEDICINE CLINIC | Age: 64
End: 2019-02-27

## 2019-02-27 VITALS
SYSTOLIC BLOOD PRESSURE: 173 MMHG | TEMPERATURE: 98.6 F | WEIGHT: 239 LBS | HEART RATE: 78 BPM | RESPIRATION RATE: 18 BRPM | DIASTOLIC BLOOD PRESSURE: 77 MMHG | OXYGEN SATURATION: 98 % | HEIGHT: 63 IN | BODY MASS INDEX: 42.35 KG/M2

## 2019-02-27 DIAGNOSIS — M54.2 NECK PAIN: ICD-10-CM

## 2019-02-27 DIAGNOSIS — I10 ESSENTIAL HYPERTENSION: ICD-10-CM

## 2019-02-27 DIAGNOSIS — E11.8 TYPE 2 DIABETES MELLITUS WITH COMPLICATION, WITHOUT LONG-TERM CURRENT USE OF INSULIN (HCC): ICD-10-CM

## 2019-02-27 DIAGNOSIS — R06.2 WHEEZING: Primary | ICD-10-CM

## 2019-02-27 RX ORDER — CLONIDINE HYDROCHLORIDE 0.1 MG/1
0.1 TABLET ORAL 2 TIMES DAILY
Qty: 60 TAB | Refills: 0 | Status: SHIPPED | OUTPATIENT
Start: 2019-02-27 | End: 2019-03-15 | Stop reason: DRUGHIGH

## 2019-02-27 RX ORDER — CYCLOBENZAPRINE HCL 10 MG
5 TABLET ORAL
Qty: 5 TAB | Refills: 0 | Status: SHIPPED | OUTPATIENT
Start: 2019-02-27 | End: 2019-04-06

## 2019-02-27 RX ORDER — IPRATROPIUM BROMIDE AND ALBUTEROL SULFATE 2.5; .5 MG/3ML; MG/3ML
3 SOLUTION RESPIRATORY (INHALATION)
Qty: 3 ML | Refills: 0
Start: 2019-02-27 | End: 2019-02-27

## 2019-02-27 NOTE — PATIENT INSTRUCTIONS
Please check your blood pressure every evening and record it on a log. If your top number is >140 for 2 days or your bottom number is >90 for 2 days please start the Clonidine 0.1mg twice daily. Home Blood Pressure Test: About This Test  What is it? A home blood pressure test allows you to keep track of your blood pressure at home. Blood pressure is a measure of the force of blood against the walls of your arteries. Blood pressure readings include two numbers, such as 130/80 (say \"130 over 80\"). The first number is the systolic pressure. The second number is the diastolic pressure. Why is this test done? You may do this test at home to:  · Find out if you have high blood pressure. · Track your blood pressure if you have high blood pressure. · Track how well medicine is working to reduce high blood pressure. · Check how lifestyle changes, such as weight loss and exercise, are affecting blood pressure. How can you prepare for the test?  · Do not use caffeine, tobacco, or medicines known to raise blood pressure (such as nasal decongestant sprays) for at least 30 minutes before taking your blood pressure. · Do not exercise for at least 30 minutes before taking your blood pressure. What happens before the test?  Take your blood pressure while you feel comfortable and relaxed. Sit quietly with both feet on the floor for at least 5 minutes before the test.  What happens during the test?  · Sit with your arm slightly bent and resting on a table so that your upper arm is at the same level as your heart. · Roll up your sleeve or take off your shirt to expose your upper arm. · Wrap the blood pressure cuff around your upper arm so that the lower edge of the cuff is about 1 inch above the bend of your elbow. Proceed with the following steps depending on if you are using an automatic or manual pressure monitor.   Automatic blood pressure monitors  · Press the on/off button on the automatic monitor and wait until the ready-to-measure \"heart\" symbol appears next to zero in the display window. · Press the start button. The cuff will inflate and deflate by itself. · Your blood pressure numbers will appear on the screen. · Write your numbers in your log book, along with the date and time. Manual blood pressure monitors  · Place the earpieces of a stethoscope in your ears, and place the bell of the stethoscope over the artery, just below the cuff. · Close the valve on the rubber inflating bulb. · Squeeze the bulb rapidly with your opposite hand to inflate the cuff until the dial or column of mercury reads about 30 mm Hg higher than your usual systolic pressure. If you do not know your usual pressure, inflate the cuff to 210 mm Hg or until the pulse at your wrist disappears. · Open the pressure valve just slightly by twisting or pressing the valve on the bulb. · As you watch the pressure slowly fall, note the level on the dial at which you first start to hear a pulsing or tapping sound through the stethoscope. This is your systolic blood pressure. · Continue letting the air out slowly. The sounds will become muffled and will finally disappear. Note the pressure when the sounds completely disappear. This is your diastolic blood pressure. Let out all the remaining air. · Write your numbers in your log book, along with the date and time. What else should you know about the test?  It is more accurate to take the average of several readings made throughout the day than to rely on a single reading. It's normal for blood pressure to go up and down throughout the day. Follow-up care is a key part of your treatment and safety. Be sure to make and go to all appointments, and call your doctor if you are having problems. It's also a good idea to keep a list of the medicines you take. Where can you learn more? Go to http://mateusz-tsering.info/.   Enter C427 in the search box to learn more about \"Home Blood Pressure Test: About This Test.\"  Current as of: July 22, 2018  Content Version: 11.9  © 0592-6037 [employer]. Care instructions adapted under license by [employer] (which disclaims liability or warranty for this information).  If you have questions about a medical condition or this instruction, always ask your healthcare professional. [place] [address] any warranty or liability for your use of this information.

## 2019-02-27 NOTE — PROGRESS NOTES
HPI     Chief Complaint   Patient presents with    Neck Pain     times 2 weeks    Labs     fasting labs per Dr. Bib Olsen     She is a 59 y.o. female who presents for neck pain, chronic check up. Neck Pain  - Started 2-3 weeks ago  - Denies trauma, thinks she slept on it wrong  - Hurts on L side below base of head  - Hurts when she turns her head to the L    DM2  - Currently on Metfomrin and Glimepiride  - Taking meds as prescribed  - HgA1c 9.9 in November  - States she is so so on diet   - States she does not check her BG at home    HTN  - Currently on Norvasc, Losartan and Metoprolol Tartrate  - Does not check BP at home  - Taken off HCTZ due to Gout  - States she thinks her BP is sometimes high at the doctors off    Obesity  - States she does not exercise regularly  - States she is \"so so\" with diet and knows she eats things she shouldn't    Wheezing  - States she uses her Albuterol inhaler 3x a day about three times a week  - States she has not used it today  - Has COPD  - Was previously on a steroid inhaler but taken off    Review of Systems   Endocrine: Negative for polydipsia and polyuria. Musculoskeletal: Positive for neck pain. Neurological: Negative for dizziness, numbness and headaches. Reviewed PmHx, RxHx, FmHx, SocHx, AllgHx and updated and dated in the chart. Physical Exam:  Visit Vitals  /77 (BP 1 Location: Left arm, BP Patient Position: Sitting)   Pulse 78   Temp 98.6 °F (37 °C) (Oral)   Resp 18   Ht 5' 3\" (1.6 m)   Wt 239 lb (108.4 kg)   LMP 09/02/2007   SpO2 98%   BMI 42.34 kg/m²     Physical Exam   Constitutional: She appears well-developed and well-nourished. No distress. Neck:   Wide neck, supple   Cardiovascular: Normal rate, regular rhythm and normal heart sounds. Exam reveals no gallop and no friction rub. No murmur heard. Pulmonary/Chest: Effort normal. No stridor. No respiratory distress. She has wheezes. She has no rales.    Good air movement BL Musculoskeletal:   Pain with L rotation, complete ROM in F/E, side bending and rotation. Pain with palpation on L side of neck below base of occiput over cervical muscles. Lymphadenopathy:     She has no cervical adenopathy. Skin: Skin is warm and dry. She is not diaphoretic. Psychiatric: She has a normal mood and affect. Her behavior is normal.   Nursing note and vitals reviewed. No results found for this or any previous visit (from the past 12 hour(s)). Assessment / Plan     Wheezing  - Given Duoneb treatment in the office with improvement of air movement and wheezing afterwards  - Continue Albuterol q4h PRN wheezing    DM2  - Recheck HgA1c, lipid panel, BMP  - Discussed likely need for Insulin if A1c is still elevated above 9 however patient states she does not want to start insulin due to cost concerns  - Discussed diet/ exercise    HTN  - Persistently high on repeat  - BP elevated on 3 different antihypertensives, cannot take HCTZ due to gout, already on BB, would recommend Clonidine given costs and need to control BP  - Recommend she check BP daily this week, if SBP >140 or DBP >90 on 2 occasions this week start Clonidine  - Recommend thorough workup for secondary causes given her BP has continue to remain elevated despite aggressive medical therapy, discussed this would be an expensive workup likely without insurance. Patient states she would like to think about it and try Clonidine first. Working on applying for [employer]. Neck Pain  - Flexeril 5mg qhs x5 nights  - Strict instructions not to try/ operate machinery after taking as it will make her drowsy  - Warm compresses, neck stretches as well  - RTC in 1-2 weeks for trigger point injections if pain does not improve    Follow up in 2 weeks    I have discussed the diagnosis with the patient and the intended plan as seen in the above orders.  The patient has received an after-visit summary and questions were answered concerning future plans.  I have discussed medication side effects and warnings with the patient as well.     Patient discussed with Dr. Melina Vincent (Attending)    Fabian Hicks DO  Family Medicine Resident

## 2019-02-27 NOTE — PROGRESS NOTES
1. Have you been to the ER, urgent care clinic since your last visit? Hospitalized since your last visit? No    2. Have you seen or consulted any other health care providers outside of the [address] [place] [address] since your last visit? Include any pap smears or colon screening. No    Chief Complaint   Patient presents with    Neck Pain     times 2 weeks    Labs     fasting labs per Dr. Raúl Woods       Patient states asthma has been acting up. Patient states does not remember doing anything to injure neck. Blood pressure 173/77, pulse 78, temperature 98.6 °F (37 °C), temperature source Oral, resp. rate 18, height 5' 3\" (1.6 m), weight 239 lb (108.4 kg), last menstrual period 09/02/2007, SpO2 98 %.

## 2019-02-28 LAB
BUN SERPL-MCNC: 11 MG/DL (ref 8–27)
BUN/CREAT SERPL: 15 (ref 12–28)
CALCIUM SERPL-MCNC: 9.7 MG/DL (ref 8.7–10.3)
CHLORIDE SERPL-SCNC: 100 MMOL/L (ref 96–106)
CHOLEST SERPL-MCNC: 152 MG/DL (ref 100–199)
CO2 SERPL-SCNC: 24 MMOL/L (ref 20–29)
CREAT SERPL-MCNC: 0.72 MG/DL (ref 0.57–1)
EST. AVERAGE GLUCOSE BLD GHB EST-MCNC: 200 MG/DL
GLUCOSE SERPL-MCNC: 161 MG/DL (ref 65–99)
HBA1C MFR BLD: 8.6 % (ref 4.8–5.6)
HDLC SERPL-MCNC: 87 MG/DL
INTERPRETATION, 910389: NORMAL
LDLC SERPL CALC-MCNC: 57 MG/DL (ref 0–99)
Lab: NORMAL
POTASSIUM SERPL-SCNC: 4.8 MMOL/L (ref 3.5–5.2)
SODIUM SERPL-SCNC: 140 MMOL/L (ref 134–144)
TRIGL SERPL-MCNC: 42 MG/DL (ref 0–149)
VLDLC SERPL CALC-MCNC: 8 MG/DL (ref 5–40)

## 2019-03-06 ENCOUNTER — TELEPHONE (OUTPATIENT)
Dept: FAMILY MEDICINE CLINIC | Age: 64
End: 2019-03-06

## 2019-03-06 NOTE — TELEPHONE ENCOUNTER
3/6/2019 8:55 AM    Called patient to discuss A1c. Still not at goal. Will need to add medication to regimen. She has appt follow up 3/15. Will discuss further at this appt.     Og Cowan, DO

## 2019-03-15 ENCOUNTER — OFFICE VISIT (OUTPATIENT)
Dept: FAMILY MEDICINE CLINIC | Age: 64
End: 2019-03-15

## 2019-03-15 VITALS
TEMPERATURE: 98 F | OXYGEN SATURATION: 99 % | HEIGHT: 63 IN | WEIGHT: 240 LBS | RESPIRATION RATE: 18 BRPM | DIASTOLIC BLOOD PRESSURE: 68 MMHG | HEART RATE: 100 BPM | BODY MASS INDEX: 42.52 KG/M2 | SYSTOLIC BLOOD PRESSURE: 158 MMHG

## 2019-03-15 DIAGNOSIS — R06.83 SNORING: ICD-10-CM

## 2019-03-15 DIAGNOSIS — I10 UNCONTROLLED HYPERTENSION: Primary | ICD-10-CM

## 2019-03-15 DIAGNOSIS — E11.65 UNCONTROLLED TYPE 2 DIABETES MELLITUS WITH HYPERGLYCEMIA (HCC): ICD-10-CM

## 2019-03-15 DIAGNOSIS — J30.89 ENVIRONMENTAL AND SEASONAL ALLERGIES: ICD-10-CM

## 2019-03-15 RX ORDER — GLIMEPIRIDE 2 MG/1
2 TABLET ORAL
Qty: 30 TAB | Refills: 2 | Status: SHIPPED | OUTPATIENT
Start: 2019-03-15 | End: 2019-11-14 | Stop reason: SDUPTHER

## 2019-03-15 RX ORDER — CLONIDINE HYDROCHLORIDE 0.2 MG/1
0.2 TABLET ORAL 2 TIMES DAILY
Qty: 60 TAB | Refills: 0 | Status: SHIPPED | OUTPATIENT
Start: 2019-03-15 | End: 2019-05-06 | Stop reason: SDUPTHER

## 2019-03-15 NOTE — PROGRESS NOTES
Chief Complaint   Patient presents with    Blood Pressure Check     1. Have you been to the ER, urgent care clinic since your last visit? Hospitalized since your last visit? No    2. Have you seen or consulted any other health care providers outside of the [address] [place] [address] since your last visit? Include any pap smears or colon screening.  No     Patient states she ia \"taking everything\"

## 2019-03-15 NOTE — PATIENT INSTRUCTIONS
[address] [place] [address] [phone]  [facility] - [phone]  [facility] - [phone]    High Blood Pressure: Care Instructions  Overview    It's normal for blood pressure to go up and down throughout the day. But if it stays up, you have high blood pressure. Another name for high blood pressure is hypertension. Despite what a lot of people think, high blood pressure usually doesn't cause headaches or make you feel dizzy or lightheaded. It usually has no symptoms. But it does increase your risk of stroke, heart attack, and other problems. You and your doctor will talk about your risks of these problems based on your blood pressure. Your doctor will give you a goal for your blood pressure. Your goal will be based on your health and your age. Lifestyle changes, such as eating healthy and being active, are always important to help lower blood pressure. You might also take medicine to reach your blood pressure goal.  Follow-up care is a key part of your treatment and safety. Be sure to make and go to all appointments, and call your doctor if you are having problems. It's also a good idea to know your test results and keep a list of the medicines you take. How can you care for yourself at home? Medical treatment  · If you stop taking your medicine, your blood pressure will go back up. You may take one or more types of medicine to lower your blood pressure. Be safe with medicines. Take your medicine exactly as prescribed. Call your doctor if you think you are having a problem with your medicine. · Talk to your doctor before you start taking aspirin every day. Aspirin can help certain people lower their risk of a heart attack or stroke. But taking aspirin isn't right for everyone, because it can cause serious bleeding. · See your doctor regularly. You may need to see the doctor more often at first or until your blood pressure comes down.   · If you are taking blood pressure medicine, talk to your doctor before you take decongestants or anti-inflammatory medicine, such as ibuprofen. Some of these medicines can raise blood pressure. · Learn how to check your blood pressure at home. Lifestyle changes  · Stay at a healthy weight. This is especially important if you put on weight around the waist. Losing even 10 pounds can help you lower your blood pressure. · If your doctor recommends it, get more exercise. Walking is a good choice. Bit by bit, increase the amount you walk every day. Try for at least 30 minutes on most days of the week. You also may want to swim, bike, or do other activities. · Avoid or limit alcohol. Talk to your doctor about whether you can drink any alcohol. · Try to limit how much sodium you eat to less than 2,300 milligrams (mg) a day. Your doctor may ask you to try to eat less than 1,500 mg a day. · Eat plenty of fruits (such as bananas and oranges), vegetables, legumes, whole grains, and low-fat dairy products. · Lower the amount of saturated fat in your diet. Saturated fat is found in animal products such as milk, cheese, and meat. Limiting these foods may help you lose weight and also lower your risk for heart disease. · Do not smoke. Smoking increases your risk for heart attack and stroke. If you need help quitting, talk to your doctor about stop-smoking programs and medicines. These can increase your chances of quitting for good. When should you call for help? Call 911 anytime you think you may need emergency care. This may mean having symptoms that suggest that your blood pressure is causing a serious heart or blood vessel problem. Your blood pressure may be over 180/120.   For example, call 911 if:    · You have symptoms of a heart attack. These may include:  ? Chest pain or pressure, or a strange feeling in the chest.  ? Sweating. ? Shortness of breath. ? Nausea or vomiting.   ? Pain, pressure, or a strange feeling in the back, neck, jaw, or upper belly or in one or both shoulders or arms. ? Lightheadedness or sudden weakness. ? A fast or irregular heartbeat.     · You have symptoms of a stroke. These may include:  ? Sudden numbness, tingling, weakness, or loss of movement in your face, arm, or leg, especially on only one side of your body. ? Sudden vision changes. ? Sudden trouble speaking. ? Sudden confusion or trouble understanding simple statements. ? Sudden problems with walking or balance. ? A sudden, severe headache that is different from past headaches.     · You have severe back or belly pain.    Do not wait until your blood pressure comes down on its own. Get help right away.   Call your doctor now or seek immediate care if:    · Your blood pressure is much higher than normal (such as 180/120 or higher), but you don't have symptoms.     · You think high blood pressure is causing symptoms, such as:  ? Severe headache.  ? Blurry vision.    Watch closely for changes in your health, and be sure to contact your doctor if:    · Your blood pressure measures higher than your doctor recommends at least 2 times. That means the top number is higher or the bottom number is higher, or both.     · You think you may be having side effects from your blood pressure medicine. Where can you learn more? Go to http://mateusz-tsering.info/. Enter I031 in the search box to learn more about \"High Blood Pressure: Care Instructions. \"  Current as of: July 22, 2018  Content Version: 11.9  © 8552-9630 [employer]. Care instructions adapted under license by [employer] (which disclaims liability or warranty for this information).  If you have questions about a medical condition or this instruction, always ask your healthcare professional. [place] [address] any warranty or liability for your use of this information.

## 2019-03-15 NOTE — PROGRESS NOTES
HPI     Chief Complaint   Patient presents with    Blood Pressure Check     She is a 59 y.o. female who presents for BP check, diabetes. DM  - HgA1c 8.6 down from 9   - Currently on Metfomrin 1000mg BID and Amaryl 2mg daily  - UTD on immunizations  - POC microalbumin/ Cr <30    HTN  - BP at home -60s  - States she does snore at night, does not complain of fatigue when she wakes up in AM  - Does not want to pursue secondary workup given she is without insurance right now   - Currently on Norvasc 10mg daily, Losartan 100mg daily, Metoprolol Tartrate 25mg BID and Clonidine 0.1mg BID    Seasonal Allergies/ Cough  - States she has a cough that makes her throat hurt  - Sometimes her ears feel full  - States she has nasal congestion    Review of Systems   HENT: Positive for congestion. Respiratory: Positive for cough. Allergic/Immunologic: Positive for environmental allergies. Reviewed PmHx, RxHx, FmHx, SocHx, AllgHx and updated and dated in the chart. Physical Exam:  Visit Vitals  /68   Pulse 100   Temp 98 °F (36.7 °C) (Oral)   Resp 18   Ht 5' 3\" (1.6 m)   Wt 240 lb (108.9 kg)   LMP 09/02/2007   SpO2 99%   BMI 42.51 kg/m²     Physical Exam   Constitutional: She appears well-developed and well-nourished. No distress. HENT:   Right Ear: External ear normal.   Left Ear: External ear normal.   Mouth/Throat: No oropharyngeal exudate. Post nasal drip, mild turbinate edema   Neck:   Wide neck   Cardiovascular: Normal rate, regular rhythm and normal heart sounds. Exam reveals no gallop and no friction rub. No murmur heard. Pulmonary/Chest: Effort normal and breath sounds normal. No stridor. No respiratory distress. She has no wheezes. She has no rales. Lymphadenopathy:     She has no cervical adenopathy. Skin: Skin is warm and dry. She is not diaphoretic. Psychiatric: She has a normal mood and affect. Her behavior is normal.   Nursing note and vitals reviewed.     No results found for this or any previous visit (from the past 12 hour(s)). Assessment / Plan     HTN  - BP still uncontrolled today  - Have recommended secondary workup however patient states she does not have Care Care or insurance and cannot afford it at this time, is currently in the process of applying for Medicare and would like to wait until she has insurance for this, declined checking labs or imaging today  - Called [facility] and patient did  her Clonidine on 2/27/19  - Will increase Clonidine 0.1mg BID to 0.2mg BID, recommend she continue to check her BP at home and RTC in 2 weeks  - Will refer to Sleep Medicine for formal sleep study, suspect she may have sleep apnea that could be contributing to picture, instructed her to apply for Care Card in the mean time    DM2, uncontrolled  - HgA1c 8.6 at this time down from 9  - Educated on lifestyle changes, recommend walking 30 min daily, watching carbohydrate intake  - Given her age and as we are changing BP meds today reccommended holding off on changing diabetes medications today and continue to follow trend of A1c, if it goes up consider increasing Amaryl to 4mg daily, for now refilled Amaryl 2mg daily  Seasonal Allergies  - Recommend she  OTC Flonase or generic form of Fluticasone in addition to taking Claritin daily  - Stay away from decongestants    RTC in 2 weeks    I have discussed the diagnosis with the patient and the intended plan as seen in the above orders. The patient has received an after-visit summary and questions were answered concerning future plans. I have discussed medication side effects and warnings with the patient as well.     Patient discussed with Dr. Krissy Joel (Attending)    Carleton Saint, DO  Family Medicine Resident

## 2019-04-05 ENCOUNTER — OFFICE VISIT (OUTPATIENT)
Dept: FAMILY MEDICINE CLINIC | Age: 64
End: 2019-04-05

## 2019-04-05 VITALS
WEIGHT: 237 LBS | BODY MASS INDEX: 41.99 KG/M2 | HEART RATE: 82 BPM | DIASTOLIC BLOOD PRESSURE: 78 MMHG | RESPIRATION RATE: 16 BRPM | SYSTOLIC BLOOD PRESSURE: 158 MMHG | TEMPERATURE: 98.6 F | HEIGHT: 63 IN | OXYGEN SATURATION: 100 %

## 2019-04-05 DIAGNOSIS — I10 UNCONTROLLED HYPERTENSION: Primary | ICD-10-CM

## 2019-04-05 RX ORDER — CHLORTHALIDONE 25 MG/1
25 TABLET ORAL DAILY
Qty: 30 TAB | Refills: 0 | Status: SHIPPED | OUTPATIENT
Start: 2019-04-05 | End: 2019-05-06 | Stop reason: SDUPTHER

## 2019-04-05 NOTE — PROGRESS NOTES
1. Have you been to the ER, urgent care clinic since your last visit? Hospitalized since your last visit? No    2. Have you seen or consulted any other health care providers outside of the [address] [place] [address] since your last visit? Include any pap smears or colon screening. No    Chief Complaint   Patient presents with    Blood Pressure Check     Patient also has complaints about pain in right arm. Blood pressure 164/83, pulse 87, temperature 98.6 °F (37 °C), temperature source Oral, resp. rate 16, height 5' 3\" (1.6 m), weight 237 lb (107.5 kg), last menstrual period 09/02/2007, SpO2 100 %.

## 2019-04-05 NOTE — PROGRESS NOTES
HPI     CC:  BP check     Ellyn Royal is a 59 y.o. female with HTN and DM who presents for BP check     At last appointment, clonidine was increased to 0.2mg BID, in addition to losartan, norvasc, and metoprolol. States she is taking medication regularly as scheduled. Pt was recommended to have secondary hypertension workup, but declined it due to insurance issues. BP ranging 150s/70s at home; checks BP about 3 times/ week. Low salt diet. Walks at [employer], but does not get much exercise otherwise       PMHx - Reviewed  Past Medical History:   Diagnosis Date    Asthma     Diabetes ([facility] [address]     Hypertension        Meds - Reviewed  Current Outpatient Medications   Medication Sig Dispense Refill    cloNIDine HCl (CATAPRES) 0.2 mg tablet Take 1 Tab by mouth two (2) times a day. 60 Tab 0    glimepiride (AMARYL) 2 mg tablet Take 1 Tab by mouth every morning. 30 Tab 2    amLODIPine (NORVASC) 10 mg tablet Take 1 Tab by mouth daily. 90 Tab 3    raNITIdine (ZANTAC) 300 mg tab Take 1 Tab by mouth daily. 90 Tab 1    montelukast (SINGULAIR) 10 mg tablet Take 1 Tab by mouth daily. For asthma prevention 90 Tab 3    metFORMIN (GLUCOPHAGE) 1,000 mg tablet Take 1 Tab by mouth two (2) times daily (with meals). 180 Tab 3    albuterol (PROVENTIL HFA, VENTOLIN HFA, PROAIR HFA) 90 mcg/actuation inhaler Take 2 Puffs by inhalation every four (4) hours as needed for Wheezing. 1 Inhaler 3    losartan (COZAAR) 100 mg tablet Take 1 Tab by mouth daily. 90 Tab 3    metoprolol tartrate (LOPRESSOR) 25 mg tablet Take 1 Tab by mouth two (2) times a day. 180 Tab 3    simvastatin (ZOCOR) 40 mg tablet Take 1 Tab by mouth nightly. 90 Tab 3    glucose blood VI test strips (ONETOUCH ULTRA TEST) strip Test 4 times a day.  100 Strip 2    Blood-Glucose Meter monitoring kit Use prn for glucose checks at variety of times, fasting, 2hr pp, bedtime Diagnosis code: E 11.65 1 Kit 1       Allergies - Reviewed  Allergies   Allergen Reactions    Codeine Nausea and Vomiting    Tramadol Nausea and Vomiting       Smoker - Reviewed  Social History     Tobacco Use   Smoking Status Never Smoker   Smokeless Tobacco Never Used       ETOH - Reviewed  Social History     Substance and Sexual Activity   Alcohol Use No       FH - Reviewed  Family History   Problem Relation Age of Onset    Diabetes Father     Diabetes Sister        ROS:  Review of Systems   Constitutional: Negative. Negative for activity change, appetite change, chills, diaphoresis and fatigue. Eyes: Negative for visual disturbance. Respiratory: Negative for chest tightness and shortness of breath. Cardiovascular: Negative for chest pain, palpitations and leg swelling. Gastrointestinal: Negative for abdominal pain, nausea and vomiting. Neurological: Negative for dizziness, light-headedness and headaches. Physical Exam:  Visit Vitals  /78 (BP 1 Location: Left arm, BP Patient Position: Sitting)   Pulse 82   Temp 98.6 °F (37 °C) (Oral)   Resp 16   Ht 5' 3\" (1.6 m)   Wt 237 lb (107.5 kg)   LMP 09/02/2007   SpO2 100%   BMI 41.98 kg/m²       Wt Readings from Last 3 Encounters:   04/05/19 237 lb (107.5 kg)   03/15/19 240 lb (108.9 kg)   02/27/19 239 lb (108.4 kg)     BP Readings from Last 3 Encounters:   04/05/19 158/78   03/15/19 158/68   02/27/19 173/77        Physical Exam   Constitutional: She is oriented to person, place, and time. She appears well-developed and well-nourished. No distress. HENT:   Head: Normocephalic and atraumatic. Right Ear: External ear normal.   Left Ear: External ear normal.   Mouth/Throat: Oropharynx is clear and moist.   Eyes: Conjunctivae are normal. No scleral icterus. Cardiovascular: Normal rate and regular rhythm. Pulmonary/Chest: Effort normal and breath sounds normal. No respiratory distress. She has no wheezes. Musculoskeletal: She exhibits no edema or tenderness. Neurological: She is alert and oriented to person, place, and time.  She exhibits normal muscle tone. Coordination normal.   Skin: Skin is warm and dry. She is not diaphoretic. Nursing note and vitals reviewed. Assessment     59 y.o. female with poorly controlled HTN and morbid obesity presents for HTN follow up :  Patient Active Problem List   Diagnosis Code    Diabetes mellitus without complication ([facility] W04.1    Essential hypertension I10    RAD (reactive airway disease) J45.909    Facial droop R29.810    S/P THAO-BSO Z90.710, Z90.722, Z90.79    H/O colonoscopy Z98.890    OA (osteoarthritis) M19.90    Gout M10.9    Obesity, morbid (HCC) E66.01       Today's diagnoses are:    ICD-10-CM ICD-9-CM    1. Uncontrolled hypertension I10 401.9 ALDOSTERONE/RENIN ACTIVITY   2. BMI 40.0-44.9, adult ([facility] Z68.41 V85.41               Plan     1. Poorly controlled HTN - 158/78 on recheck, above goal. On amlodipine, clonidine, losartan, and metoprolol  - trial of Chlorthalidone 25mg daily  - check BP daily and bring log in to next appointment  - continue Norvasc 10 mg, Clonidine 0.2 mg BID, Losartan 100mg daily, and Metoprolol 25 mg BID   - will check Stanislav/ Renin ratio. Patient refused to do further secondary HTN workup until she has insurance due to financial issues. - discussed importance of diet and exercise. Recommended low salt diet and DASH diet     2. Body mass index is 41.98 kg/m². - discussed importance of diet and exercise     Follow up in 2 weeks    Prior labs and imaging were reviewed. I have discussed the diagnosis with the patient and the intended plan as seen in the above orders. The patient has received an after-visit summary and questions were answered concerning future plans. I have discussed medication side effects and warnings with the patient as well. Patient discussed with Dr. Katya Mccray, Attending Physician.     Samanta Olivarez MD, PGY3  Family Medicine Resident

## 2019-04-05 NOTE — PATIENT INSTRUCTIONS
High Blood Pressure: Care Instructions  Overview    It's normal for blood pressure to go up and down throughout the day. But if it stays up, you have high blood pressure. Another name for high blood pressure is hypertension. Despite what a lot of people think, high blood pressure usually doesn't cause headaches or make you feel dizzy or lightheaded. It usually has no symptoms. But it does increase your risk of stroke, heart attack, and other problems. You and your doctor will talk about your risks of these problems based on your blood pressure. Your doctor will give you a goal for your blood pressure. Your goal will be based on your health and your age. Lifestyle changes, such as eating healthy and being active, are always important to help lower blood pressure. You might also take medicine to reach your blood pressure goal.  Follow-up care is a key part of your treatment and safety. Be sure to make and go to all appointments, and call your doctor if you are having problems. It's also a good idea to know your test results and keep a list of the medicines you take. How can you care for yourself at home? Medical treatment  · If you stop taking your medicine, your blood pressure will go back up. You may take one or more types of medicine to lower your blood pressure. Be safe with medicines. Take your medicine exactly as prescribed. Call your doctor if you think you are having a problem with your medicine. · Talk to your doctor before you start taking aspirin every day. Aspirin can help certain people lower their risk of a heart attack or stroke. But taking aspirin isn't right for everyone, because it can cause serious bleeding. · See your doctor regularly. You may need to see the doctor more often at first or until your blood pressure comes down. · If you are taking blood pressure medicine, talk to your doctor before you take decongestants or anti-inflammatory medicine, such as ibuprofen.  Some of these medicines can raise blood pressure. · Learn how to check your blood pressure at home. Lifestyle changes  · Stay at a healthy weight. This is especially important if you put on weight around the waist. Losing even 10 pounds can help you lower your blood pressure. · If your doctor recommends it, get more exercise. Walking is a good choice. Bit by bit, increase the amount you walk every day. Try for at least 30 minutes on most days of the week. You also may want to swim, bike, or do other activities. · Avoid or limit alcohol. Talk to your doctor about whether you can drink any alcohol. · Try to limit how much sodium you eat to less than 2,300 milligrams (mg) a day. Your doctor may ask you to try to eat less than 1,500 mg a day. · Eat plenty of fruits (such as bananas and oranges), vegetables, legumes, whole grains, and low-fat dairy products. · Lower the amount of saturated fat in your diet. Saturated fat is found in animal products such as milk, cheese, and meat. Limiting these foods may help you lose weight and also lower your risk for heart disease. · Do not smoke. Smoking increases your risk for heart attack and stroke. If you need help quitting, talk to your doctor about stop-smoking programs and medicines. These can increase your chances of quitting for good. When should you call for help? Call 911 anytime you think you may need emergency care. This may mean having symptoms that suggest that your blood pressure is causing a serious heart or blood vessel problem. Your blood pressure may be over 180/120.   For example, call 911 if:    · You have symptoms of a heart attack. These may include:  ? Chest pain or pressure, or a strange feeling in the chest.  ? Sweating. ? Shortness of breath. ? Nausea or vomiting. ? Pain, pressure, or a strange feeling in the back, neck, jaw, or upper belly or in one or both shoulders or arms. ? Lightheadedness or sudden weakness.   ? A fast or irregular heartbeat.     · You have symptoms of a stroke. These may include:  ? Sudden numbness, tingling, weakness, or loss of movement in your face, arm, or leg, especially on only one side of your body. ? Sudden vision changes. ? Sudden trouble speaking. ? Sudden confusion or trouble understanding simple statements. ? Sudden problems with walking or balance. ? A sudden, severe headache that is different from past headaches.     · You have severe back or belly pain.    Do not wait until your blood pressure comes down on its own. Get help right away.   Call your doctor now or seek immediate care if:    · Your blood pressure is much higher than normal (such as 180/120 or higher), but you don't have symptoms.     · You think high blood pressure is causing symptoms, such as:  ? Severe headache.  ? Blurry vision.    Watch closely for changes in your health, and be sure to contact your doctor if:    · Your blood pressure measures higher than your doctor recommends at least 2 times. That means the top number is higher or the bottom number is higher, or both.     · You think you may be having side effects from your blood pressure medicine. Where can you learn more? Go to http://mateusz-tsering.info/. Enter U385 in the search box to learn more about \"High Blood Pressure: Care Instructions. \"  Current as of: July 22, 2018  Content Version: 11.9  © 4916-5866 [employer]. Care instructions adapted under license by [employer] (which disclaims liability or warranty for this information). If you have questions about a medical condition or this instruction, always ask your healthcare professional. [place] [address] any warranty or liability for your use of this information. Low Sodium Diet (2,000 Milligram): Care Instructions  Your Care Instructions    Too much sodium causes your body to hold on to extra water.  This can raise your blood pressure and force your heart and kidneys to work harder. In very serious cases, this could cause you to be put in the hospital. It might even be life-threatening. By limiting sodium, you will feel better and lower your risk of serious problems. The most common source of sodium is salt. People get most of the salt in their diet from canned, prepared, and packaged foods. Fast food and restaurant meals also are very high in sodium. Your doctor will probably limit your sodium to less than 2,000 milligrams (mg) a day. This limit counts all the sodium in prepared and packaged foods and any salt you add to your food. Follow-up care is a key part of your treatment and safety. Be sure to make and go to all appointments, and call your doctor if you are having problems. It's also a good idea to know your test results and keep a list of the medicines you take. How can you care for yourself at home? Read food labels  · Read labels on cans and food packages. The labels tell you how much sodium is in each serving. Make sure that you look at the serving size. If you eat more than the serving size, you have eaten more sodium. · Food labels also tell you the Percent Daily Value for sodium. Choose products with low Percent Daily Values for sodium. · Be aware that sodium can come in forms other than salt, including monosodium glutamate (MSG), sodium citrate, and sodium bicarbonate (baking soda). MSG is often added to Asian food. When you eat out, you can sometimes ask for food without MSG or added salt. Buy low-sodium foods  · Buy foods that are labeled \"unsalted\" (no salt added), \"sodium-free\" (less than 5 mg of sodium per serving), or \"low-sodium\" (less than 140 mg of sodium per serving). Foods labeled \"reduced-sodium\" and \"light sodium\" may still have too much sodium. Be sure to read the label to see how much sodium you are getting. · Buy fresh vegetables, or frozen vegetables without added sauces.  Buy low-sodium versions of canned vegetables, soups, and other canned goods.  Prepare low-sodium meals  · Cut back on the amount of salt you use in cooking. This will help you adjust to the taste. Do not add salt after cooking. One teaspoon of salt has about 2,300 mg of sodium. · Take the salt shaker off the table. · Flavor your food with garlic, lemon juice, onion, vinegar, herbs, and spices. Do not use soy sauce, lite soy sauce, steak sauce, onion salt, garlic salt, celery salt, mustard, or ketchup on your food. · Use low-sodium salad dressings, sauces, and ketchup. Or make your own salad dressings and sauces without adding salt. · Use less salt (or none) when recipes call for it. You can often use half the salt a recipe calls for without losing flavor. Other foods such as rice, pasta, and grains do not need added salt. · Rinse canned vegetables, and cook them in fresh water. This removes some--but not all--of the salt. · Avoid water that is naturally high in sodium or that has been treated with water softeners, which add sodium. Call your local water company to find out the sodium content of your water supply. If you buy bottled water, read the label and choose a sodium-free brand. Avoid high-sodium foods  · Avoid eating:  ? Smoked, cured, salted, and canned meat, fish, and poultry. ? Ham, pickett, hot dogs, and luncheon meats. ? Regular, hard, and processed cheese and regular peanut butter. ? Crackers with salted tops, and other salted snack foods such as pretzels, chips, and salted popcorn. ? Frozen prepared meals, unless labeled low-sodium. ? Canned and dried soups, broths, and bouillon, unless labeled sodium-free or low-sodium. ? Canned vegetables, unless labeled sodium-free or low-sodium. ? [place] fries, pizza, tacos, and other fast foods. ? Pickles, olives, ketchup, and other condiments, especially soy sauce, unless labeled sodium-free or low-sodium. Where can you learn more? Go to http://mateusz-tsering.info/.   Enter Z729 in the search box to learn more about \"Low Sodium Diet (2,000 Milligram): Care Instructions. \"  Current as of: March 28, 2018  Content Version: 11.9  © 4509-2767 [employer]. Care instructions adapted under license by [employer] (which disclaims liability or warranty for this information). If you have questions about a medical condition or this instruction, always ask your healthcare professional. [address] any warranty or liability for your use of this information. DASH Diet: Care Instructions  Your Care Instructions    The DASH diet is an eating plan that can help lower your blood pressure. DASH stands for Dietary Approaches to Stop Hypertension. Hypertension is high blood pressure. The DASH diet focuses on eating foods that are high in calcium, potassium, and magnesium. These nutrients can lower blood pressure. The foods that are highest in these nutrients are fruits, vegetables, low-fat dairy products, nuts, seeds, and legumes. But taking calcium, potassium, and magnesium supplements instead of eating foods that are high in those nutrients does not have the same effect. The DASH diet also includes whole grains, fish, and poultry. The DASH diet is one of several lifestyle changes your doctor may recommend to lower your high blood pressure. Your doctor may also want you to decrease the amount of sodium in your diet. Lowering sodium while following the DASH diet can lower blood pressure even further than just the DASH diet alone. Follow-up care is a key part of your treatment and safety. Be sure to make and go to all appointments, and call your doctor if you are having problems. It's also a good idea to know your test results and keep a list of the medicines you take. How can you care for yourself at home? Following the DASH diet  · Eat 4 to 5 servings of fruit each day.  A serving is 1 medium-sized piece of fruit, ½ cup chopped or canned fruit, 1/4 cup dried fruit, or 4 ounces (½ cup) of fruit juice. Choose fruit more often than fruit juice. · Eat 4 to 5 servings of vegetables each day. A serving is 1 cup of lettuce or raw leafy vegetables, ½ cup of chopped or cooked vegetables, or 4 ounces (½ cup) of vegetable juice. Choose vegetables more often than vegetable juice. · Get 2 to 3 servings of low-fat and fat-free dairy each day. A serving is 8 ounces of milk, 1 cup of yogurt, or 1 ½ ounces of cheese. · Eat 6 to 8 servings of grains each day. A serving is 1 slice of bread, 1 ounce of dry cereal, or ½ cup of cooked rice, pasta, or cooked cereal. Try to choose whole-grain products as much as possible. · Limit lean meat, poultry, and fish to 2 servings each day. A serving is 3 ounces, about the size of a deck of cards. · Eat 4 to 5 servings of nuts, seeds, and legumes (cooked dried beans, lentils, and split peas) each week. A serving is 1/3 cup of nuts, 2 tablespoons of seeds, or ½ cup of cooked beans or peas. · Limit fats and oils to 2 to 3 servings each day. A serving is 1 teaspoon of vegetable oil or 2 tablespoons of salad dressing. · Limit sweets and added sugars to 5 servings or less a week. A serving is 1 tablespoon jelly or jam, ½ cup sorbet, or 1 cup of lemonade. · Eat less than 2,300 milligrams (mg) of sodium a day. If you limit your sodium to 1,500 mg a day, you can lower your blood pressure even more. Tips for success  · Start small. Do not try to make dramatic changes to your diet all at once. You might feel that you are missing out on your favorite foods and then be more likely to not follow the plan. Make small changes, and stick with them. Once those changes become habit, add a few more changes. · Try some of the following:  ? Make it a goal to eat a fruit or vegetable at every meal and at snacks. This will make it easy to get the recommended amount of fruits and vegetables each day.   ? Try yogurt topped with fruit and nuts for a snack or healthy dessert. ? Add lettuce, tomato, cucumber, and onion to sandwiches. ? Combine a ready-made pizza crust with low-fat mozzarella cheese and lots of vegetable toppings. Try using tomatoes, squash, spinach, broccoli, carrots, cauliflower, and onions. ? Have a variety of cut-up vegetables with a low-fat dip as an appetizer instead of chips and dip. ? Sprinkle sunflower seeds or chopped almonds over salads. Or try adding chopped walnuts or almonds to cooked vegetables. ? Try some vegetarian meals using beans and peas. Add garbanzo or kidney beans to salads. Make burritos and tacos with mashed alvarez beans or black beans. Where can you learn more? Go to http://mateusz-tsering.info/. Enter Y433 in the search box to learn more about \"DASH Diet: Care Instructions. \"  Current as of: July 22, 2018  Content Version: 11.9  © 0058-8039 [employer]. Care instructions adapted under license by [employer] (which disclaims liability or warranty for this information). If you have questions about a medical condition or this instruction, always ask your healthcare professional. [address] any warranty or liability for your use of this information.

## 2019-04-10 LAB
ALDOST SERPL-MCNC: 1.3 NG/DL (ref 0–30)
RENIN PLAS-CCNC: 0.23 NG/ML/HR (ref 0.17–5.38)

## 2019-05-06 ENCOUNTER — OFFICE VISIT (OUTPATIENT)
Dept: FAMILY MEDICINE CLINIC | Age: 64
End: 2019-05-06

## 2019-05-06 VITALS
WEIGHT: 233 LBS | HEIGHT: 63 IN | TEMPERATURE: 98 F | SYSTOLIC BLOOD PRESSURE: 137 MMHG | HEART RATE: 74 BPM | DIASTOLIC BLOOD PRESSURE: 74 MMHG | BODY MASS INDEX: 41.29 KG/M2 | RESPIRATION RATE: 18 BRPM | OXYGEN SATURATION: 98 %

## 2019-05-06 DIAGNOSIS — I10 HYPERTENSION GOAL BP (BLOOD PRESSURE) < 140/90: Primary | ICD-10-CM

## 2019-05-06 DIAGNOSIS — J45.40 MODERATE PERSISTENT ASTHMA WITHOUT COMPLICATION: ICD-10-CM

## 2019-05-06 DIAGNOSIS — M79.601 RIGHT ARM PAIN: ICD-10-CM

## 2019-05-06 RX ORDER — CHLORTHALIDONE 25 MG/1
25 TABLET ORAL DAILY
Qty: 90 TAB | Refills: 0 | Status: SHIPPED | OUTPATIENT
Start: 2019-05-06 | End: 2019-08-02 | Stop reason: SDUPTHER

## 2019-05-06 RX ORDER — FLUTICASONE PROPIONATE AND SALMETEROL 100; 50 UG/1; UG/1
1 POWDER RESPIRATORY (INHALATION) EVERY 12 HOURS
Qty: 1 INHALER | Refills: 2 | Status: SHIPPED | OUTPATIENT
Start: 2019-05-06 | End: 2019-10-31 | Stop reason: SDUPTHER

## 2019-05-06 RX ORDER — CLONIDINE HYDROCHLORIDE 0.2 MG/1
0.2 TABLET ORAL 2 TIMES DAILY
Qty: 180 TAB | Refills: 0 | Status: SHIPPED | OUTPATIENT
Start: 2019-05-06 | End: 2019-08-23 | Stop reason: SDUPTHER

## 2019-05-06 RX ORDER — ALBUTEROL SULFATE 90 UG/1
2 AEROSOL, METERED RESPIRATORY (INHALATION)
Qty: 1 INHALER | Refills: 3 | Status: SHIPPED | OUTPATIENT
Start: 2019-05-06 | End: 2019-10-07 | Stop reason: SDUPTHER

## 2019-05-06 NOTE — PROGRESS NOTES
Jaycee Delacruz is a 59 y.o. female  Chief Complaint   Patient presents with    Blood Pressure Check    Arm Pain     states has some R arm that is \"off and on for awhile\"     Visit Vitals  /74 (BP 1 Location: Left arm, BP Patient Position: Sitting)   Pulse 74   Temp 98 °F (36.7 °C) (Oral)   Resp 18   Ht 5' 3\" (1.6 m)   Wt 233 lb (105.7 kg)   LMP 09/02/2007   SpO2 98%   BMI 41.27 kg/m²     1. Have you been to the ER, urgent care clinic since your last visit? Hospitalized since your last visit? No    2. Have you seen or consulted any other health care providers outside of the [address] [place] [address] since your last visit? Include any pap smears or colon screening.  No  Health Maintenance Due   Topic Date Due    EYE EXAM RETINAL OR DILATED  02[birth year]17/1965    FOBT Q 1 YEAR AGE 50-75  10/01/2016    PAP AKA CERVICAL CYTOLOGY  10/01/2018    BREAST CANCER SCRN MAMMOGRAM  08/17/2019

## 2019-05-06 NOTE — PROGRESS NOTES
HPI     CC: BP check, R arm pain    Mikal Vallecillo is a 59 y.o. female who presents for BP check, R arm pain, and asthma. At last appointment, pt was started on chlorthalidone, in addition to previous clonidine, norvasc, losartan, and metoprolol. Stanislav/ renin ratio at last appt wnl. States that BP 140s/ 60s at home after started on chlorthalidone. Previously discussed secondary HTN workup, but pt wanted to defer until after her insurance was set up. Endorsed R arm pain for about 1 month for about 2-3 days/ week. Sleeps on her R side and states that it is worse when she wakes up in the morning. Takes tylenol, advil with some relief. Denies trauma to area. Using albuterol inhaler 1-2 times/ day because feeling SOB with wheezing. Previously on Breo but stopped due to cost. Denies chest pain, SOB. PMHx - Reviewed  Past Medical History:   Diagnosis Date    Asthma     Diabetes ([facility] [address]     Hypertension        Meds - Reviewed  Current Outpatient Medications   Medication Sig Dispense Refill    chlorthalidone (HYGROTEN) 25 mg tablet Take 1 Tab by mouth daily. 30 Tab 0    cloNIDine HCl (CATAPRES) 0.2 mg tablet Take 1 Tab by mouth two (2) times a day. 60 Tab 0    glimepiride (AMARYL) 2 mg tablet Take 1 Tab by mouth every morning. 30 Tab 2    amLODIPine (NORVASC) 10 mg tablet Take 1 Tab by mouth daily. 90 Tab 3    raNITIdine (ZANTAC) 300 mg tab Take 1 Tab by mouth daily. 90 Tab 1    montelukast (SINGULAIR) 10 mg tablet Take 1 Tab by mouth daily. For asthma prevention 90 Tab 3    metFORMIN (GLUCOPHAGE) 1,000 mg tablet Take 1 Tab by mouth two (2) times daily (with meals). 180 Tab 3    albuterol (PROVENTIL HFA, VENTOLIN HFA, PROAIR HFA) 90 mcg/actuation inhaler Take 2 Puffs by inhalation every four (4) hours as needed for Wheezing. 1 Inhaler 3    losartan (COZAAR) 100 mg tablet Take 1 Tab by mouth daily. 90 Tab 3    metoprolol tartrate (LOPRESSOR) 25 mg tablet Take 1 Tab by mouth two (2) times a day. 180 Tab 3    simvastatin (ZOCOR) 40 mg tablet Take 1 Tab by mouth nightly. 90 Tab 3    glucose blood VI test strips (ONETOUCH ULTRA TEST) strip Test 4 times a day. 100 Strip 2    Blood-Glucose Meter monitoring kit Use prn for glucose checks at variety of times, fasting, 2hr pp, bedtime Diagnosis code: E 11.65 1 Kit 1       Allergies - Reviewed  Allergies   Allergen Reactions    Codeine Nausea and Vomiting    Tramadol Nausea and Vomiting       Smoker - Reviewed  Social History     Tobacco Use   Smoking Status Never Smoker   Smokeless Tobacco Never Used       ETOH - Reviewed  Social History     Substance and Sexual Activity   Alcohol Use No       FH - Reviewed  Family History   Problem Relation Age of Onset    Diabetes Father     Diabetes Sister        ROS:  Review of Systems   Constitutional: Negative. Negative for activity change, appetite change, chills, diaphoresis, fatigue and unexpected weight change. Eyes: Negative for visual disturbance. Respiratory: Positive for wheezing. Negative for cough, chest tightness and shortness of breath. Cardiovascular: Negative for chest pain, palpitations and leg swelling. Gastrointestinal: Negative for abdominal pain, nausea and vomiting. Genitourinary: Negative for difficulty urinating. Musculoskeletal:        Intermittent R arm pain   Skin: Negative for rash and wound. Neurological: Negative for dizziness, light-headedness and headaches.        Physical Exam:  Visit Vitals  /74 (BP 1 Location: Left arm, BP Patient Position: Sitting)   Pulse 74   Temp 98 °F (36.7 °C) (Oral)   Resp 18   Ht 5' 3\" (1.6 m)   Wt 233 lb (105.7 kg)   LMP 09/02/2007   SpO2 98%   BMI 41.27 kg/m²       Wt Readings from Last 3 Encounters:   05/06/19 233 lb (105.7 kg)   04/05/19 237 lb (107.5 kg)   03/15/19 240 lb (108.9 kg)     BP Readings from Last 3 Encounters:   05/06/19 137/74   04/05/19 158/78   03/15/19 158/68        Physical Exam   Constitutional: She is oriented to person, place, and time. She appears well-developed and well-nourished. No distress. HENT:   Head: Normocephalic and atraumatic. Mouth/Throat: Oropharynx is clear and moist.   Eyes: Pupils are equal, round, and reactive to light. Conjunctivae and EOM are normal. No scleral icterus. Cardiovascular: Normal rate and regular rhythm. Pulmonary/Chest: Effort normal. No respiratory distress. She has wheezes (scattered expiratory wheezes). Musculoskeletal: She exhibits no edema or tenderness. Normal ROM of BUE; nontender to palpation or with ROM   Neurological: She is alert and oriented to person, place, and time. No sensory deficit. She exhibits normal muscle tone. Coordination normal.   BUE 5/5 strength    Skin: Skin is warm and dry. No rash noted. She is not diaphoretic. Nursing note and vitals reviewed. Assessment     59 y.o. female with DM, HTN, asthma, morbid obesity presents for follow up of  BP, asthma, and RUE pain:  Patient Active Problem List   Diagnosis Code    [facility] E07.0    Essential hypertension I10    RAD (reactive airway disease) J45.909    Facial droop R29.810    S/P THAO-BSO Z90.710, Z90.722, Z90.79    H/O colonoscopy Z98.890    OA (osteoarthritis) M19.90    Gout M10.9    Obesity, morbid (HCC) E66.01       Today's diagnoses are:    ICD-10-CM ICD-9-CM    1. Hypertension goal BP (blood pressure) < 140/90 I10 401.9 chlorthalidone (HYGROTEN) 25 mg tablet      cloNIDine HCl (CATAPRES) 0.2 mg tablet      METABOLIC PANEL, BASIC   2. Moderate persistent asthma without complication D51.44 173.40 albuterol (PROVENTIL HFA, VENTOLIN HFA, PROAIR HFA) 90 mcg/actuation inhaler      fluticasone propion-salmeterol (ADVAIR DISKUS) 100-50 mcg/dose diskus inhaler   3. Right arm pain M79.601 729.5               Plan     1.  HTN - 137/74 today, at goal. Stanislav/ renin wnl   - refill Chlorthalidone 25 mg daily  - refill Clonidine 0.2 mg BID  - check BMP  - continue Norvasc 10 mg daily, Losartan 100mg daily, and Metoprolol 25 mg daily   - will return for secondary HTN workup once insurance issues have been fixed     2. Asthma  - refill Albuterol PRN  - trial of Advair     3. RUE pain - normal ROM and sensation on exam, without tenderness. Possibly due to arthritis, and exacerbated by sleeping on it. No trauma. - continue tylenol/ ibuprofen PRN   - RTC if symptoms worsen       Prior labs and imaging were reviewed. I have discussed the diagnosis with the patient and the intended plan as seen in the above orders. The patient has received an after-visit summary and questions were answered concerning future plans. I have discussed medication side effects and warnings with the patient as well. Patient discussed with Dr. Tomas Fields, Attending Physician.     Radha Goldstein MD, PGY3  Family Medicine Resident

## 2019-05-06 NOTE — PATIENT INSTRUCTIONS
Learning About Asthma Triggers  What are asthma triggers? When you have asthma, certain things can make your symptoms worse. These are called triggers. Learn what triggers an asthma attack for you, and avoid the triggers when you can. Common triggers include colds, smoke, air pollution, dust, pollen, pets, stress, and cold air. How do asthma triggers affect you? Triggers can make it harder for your lungs to work as they should. They can lead to sudden breathing problems and other symptoms. When you are around a trigger, an asthma attack is more likely. If your symptoms are severe, you may need emergency treatment or have to go to the hospital for treatment. What can you do to avoid triggers? The first thing is to know your triggers. When you are having symptoms, note the things around you that might be causing them. Then look for patterns that may be triggering your symptoms. Record your triggers on a piece of paper or in an asthma diary. When you have your list of possible triggers, work with your doctor to find ways to avoid them. Avoid colds and flu. Get a pneumococcal vaccine shot. If you have had one before, ask your doctor whether you need a second dose. Get a flu vaccine every year, as soon as it's available. If you must be around people with colds or the flu, wash your hands often. Here are some ways to avoid a few common triggers. · Do not smoke or allow others to smoke around you. If you need help quitting, talk to your doctor about stop-smoking programs and medicines. These can increase your chances of quitting for good. · If there is a lot of pollution, pollen, or dust outside, stay at home and keep your windows closed. Use an air conditioner or air filter in your home. Check your local weather report or newspaper for air quality and pollen reports. What else should you know? · Take your controller medicine every day, not just when you have symptoms.  It helps prevent problems before they occur. · Your doctor may suggest that you check how well your lungs are working by measuring your peak expiratory flow (PEF) throughout the day. Your PEF may drop when you are near things that trigger symptoms. Where can you learn more? Go to http://mateusz-tsering.info/. Enter E976 in the search box to learn more about \"Learning About Asthma Triggers. \"  Current as of: September 5, 2018  Content Version: 11.9  © 3706-9535 [employer]. Care instructions adapted under license by [employer] (which disclaims liability or warranty for this information). If you have questions about a medical condition or this instruction, always ask your healthcare professional. [place] [address] any warranty or liability for your use of this information. DASH Diet: Care Instructions  Your Care Instructions    The DASH diet is an eating plan that can help lower your blood pressure. DASH stands for Dietary Approaches to Stop Hypertension. Hypertension is high blood pressure. The DASH diet focuses on eating foods that are high in calcium, potassium, and magnesium. These nutrients can lower blood pressure. The foods that are highest in these nutrients are fruits, vegetables, low-fat dairy products, nuts, seeds, and legumes. But taking calcium, potassium, and magnesium supplements instead of eating foods that are high in those nutrients does not have the same effect. The DASH diet also includes whole grains, fish, and poultry. The DASH diet is one of several lifestyle changes your doctor may recommend to lower your high blood pressure. Your doctor may also want you to decrease the amount of sodium in your diet. Lowering sodium while following the DASH diet can lower blood pressure even further than just the DASH diet alone. Follow-up care is a key part of your treatment and safety.  Be sure to make and go to all appointments, and call your doctor if you are having problems. It's also a good idea to know your test results and keep a list of the medicines you take. How can you care for yourself at home? Following the DASH diet  · Eat 4 to 5 servings of fruit each day. A serving is 1 medium-sized piece of fruit, ½ cup chopped or canned fruit, 1/4 cup dried fruit, or 4 ounces (½ cup) of fruit juice. Choose fruit more often than fruit juice. · Eat 4 to 5 servings of vegetables each day. A serving is 1 cup of lettuce or raw leafy vegetables, ½ cup of chopped or cooked vegetables, or 4 ounces (½ cup) of vegetable juice. Choose vegetables more often than vegetable juice. · Get 2 to 3 servings of low-fat and fat-free dairy each day. A serving is 8 ounces of milk, 1 cup of yogurt, or 1 ½ ounces of cheese. · Eat 6 to 8 servings of grains each day. A serving is 1 slice of bread, 1 ounce of dry cereal, or ½ cup of cooked rice, pasta, or cooked cereal. Try to choose whole-grain products as much as possible. · Limit lean meat, poultry, and fish to 2 servings each day. A serving is 3 ounces, about the size of a deck of cards. · Eat 4 to 5 servings of nuts, seeds, and legumes (cooked dried beans, lentils, and split peas) each week. A serving is 1/3 cup of nuts, 2 tablespoons of seeds, or ½ cup of cooked beans or peas. · Limit fats and oils to 2 to 3 servings each day. A serving is 1 teaspoon of vegetable oil or 2 tablespoons of salad dressing. · Limit sweets and added sugars to 5 servings or less a week. A serving is 1 tablespoon jelly or jam, ½ cup sorbet, or 1 cup of lemonade. · Eat less than 2,300 milligrams (mg) of sodium a day. If you limit your sodium to 1,500 mg a day, you can lower your blood pressure even more. Tips for success  · Start small. Do not try to make dramatic changes to your diet all at once. You might feel that you are missing out on your favorite foods and then be more likely to not follow the plan. Make small changes, and stick with them.  Once those changes become habit, add a few more changes. · Try some of the following:  ? Make it a goal to eat a fruit or vegetable at every meal and at snacks. This will make it easy to get the recommended amount of fruits and vegetables each day. ? Try yogurt topped with fruit and nuts for a snack or healthy dessert. ? Add lettuce, tomato, cucumber, and onion to sandwiches. ? Combine a ready-made pizza crust with low-fat mozzarella cheese and lots of vegetable toppings. Try using tomatoes, squash, spinach, broccoli, carrots, cauliflower, and onions. ? Have a variety of cut-up vegetables with a low-fat dip as an appetizer instead of chips and dip. ? Sprinkle sunflower seeds or chopped almonds over salads. Or try adding chopped walnuts or almonds to cooked vegetables. ? Try some vegetarian meals using beans and peas. Add garbanzo or kidney beans to salads. Make burritos and tacos with mashed alvarez beans or black beans. Where can you learn more? Go to http://mateusz-tsering.info/. Enter T965 in the search box to learn more about \"DASH Diet: Care Instructions. \"  Current as of: July 22, 2018  Content Version: 11.9  © 9062-6624 [employer]. Care instructions adapted under license by [employer] (which disclaims liability or warranty for this information). If you have questions about a medical condition or this instruction, always ask your healthcare professional. [place] [address] any warranty or liability for your use of this information. Low Sodium Diet (2,000 Milligram): Care Instructions  Your Care Instructions    Too much sodium causes your body to hold on to extra water. This can raise your blood pressure and force your heart and kidneys to work harder. In very serious cases, this could cause you to be put in the hospital. It might even be life-threatening. By limiting sodium, you will feel better and lower your risk of serious problems.   The most common source of sodium is salt. People get most of the salt in their diet from canned, prepared, and packaged foods. Fast food and restaurant meals also are very high in sodium. Your doctor will probably limit your sodium to less than 2,000 milligrams (mg) a day. This limit counts all the sodium in prepared and packaged foods and any salt you add to your food. Follow-up care is a key part of your treatment and safety. Be sure to make and go to all appointments, and call your doctor if you are having problems. It's also a good idea to know your test results and keep a list of the medicines you take. How can you care for yourself at home? Read food labels  · Read labels on cans and food packages. The labels tell you how much sodium is in each serving. Make sure that you look at the serving size. If you eat more than the serving size, you have eaten more sodium. · Food labels also tell you the Percent Daily Value for sodium. Choose products with low Percent Daily Values for sodium. · Be aware that sodium can come in forms other than salt, including monosodium glutamate (MSG), sodium citrate, and sodium bicarbonate (baking soda). MSG is often added to Asian food. When you eat out, you can sometimes ask for food without MSG or added salt. Buy low-sodium foods  · Buy foods that are labeled \"unsalted\" (no salt added), \"sodium-free\" (less than 5 mg of sodium per serving), or \"low-sodium\" (less than 140 mg of sodium per serving). Foods labeled \"reduced-sodium\" and \"light sodium\" may still have too much sodium. Be sure to read the label to see how much sodium you are getting. · Buy fresh vegetables, or frozen vegetables without added sauces. Buy low-sodium versions of canned vegetables, soups, and other canned goods. Prepare low-sodium meals  · Cut back on the amount of salt you use in cooking. This will help you adjust to the taste. Do not add salt after cooking. One teaspoon of salt has about 2,300 mg of sodium.   · Take the salt shaker off the table. · Flavor your food with garlic, lemon juice, onion, vinegar, herbs, and spices. Do not use soy sauce, lite soy sauce, steak sauce, onion salt, garlic salt, celery salt, mustard, or ketchup on your food. · Use low-sodium salad dressings, sauces, and ketchup. Or make your own salad dressings and sauces without adding salt. · Use less salt (or none) when recipes call for it. You can often use half the salt a recipe calls for without losing flavor. Other foods such as rice, pasta, and grains do not need added salt. · Rinse canned vegetables, and cook them in fresh water. This removes some--but not all--of the salt. · Avoid water that is naturally high in sodium or that has been treated with water softeners, which add sodium. Call your local water company to find out the sodium content of your water supply. If you buy bottled water, read the label and choose a sodium-free brand. Avoid high-sodium foods  · Avoid eating:  ? Smoked, cured, salted, and canned meat, fish, and poultry. ? Ham, pickett, hot dogs, and luncheon meats. ? Regular, hard, and processed cheese and regular peanut butter. ? Crackers with salted tops, and other salted snack foods such as pretzels, chips, and salted popcorn. ? Frozen prepared meals, unless labeled low-sodium. ? Canned and dried soups, broths, and bouillon, unless labeled sodium-free or low-sodium. ? Canned vegetables, unless labeled sodium-free or low-sodium. ? [place] fries, pizza, tacos, and other fast foods. ? Pickles, olives, ketchup, and other condiments, especially soy sauce, unless labeled sodium-free or low-sodium. Where can you learn more? Go to http://mateusz-tsering.info/. Enter W863 in the search box to learn more about \"Low Sodium Diet (2,000 Milligram): Care Instructions. \"  Current as of: March 28, 2018  Content Version: 11.9  © 1172-3763 [employer].  Care instructions adapted under license by [employer] (which disclaims liability or warranty for this information). If you have questions about a medical condition or this instruction, always ask your healthcare professional. [address] any warranty or liability for your use of this information.

## 2019-05-07 LAB
BUN SERPL-MCNC: 17 MG/DL (ref 8–27)
BUN/CREAT SERPL: 22 (ref 12–28)
CALCIUM SERPL-MCNC: 9.7 MG/DL (ref 8.7–10.3)
CHLORIDE SERPL-SCNC: 97 MMOL/L (ref 96–106)
CO2 SERPL-SCNC: 27 MMOL/L (ref 20–29)
CREAT SERPL-MCNC: 0.77 MG/DL (ref 0.57–1)
GLUCOSE SERPL-MCNC: 178 MG/DL (ref 65–99)
POTASSIUM SERPL-SCNC: 4 MMOL/L (ref 3.5–5.2)
SODIUM SERPL-SCNC: 140 MMOL/L (ref 134–144)

## 2019-07-31 ENCOUNTER — OFFICE VISIT (OUTPATIENT)
Dept: FAMILY MEDICINE CLINIC | Age: 64
End: 2019-07-31

## 2019-07-31 VITALS
SYSTOLIC BLOOD PRESSURE: 116 MMHG | TEMPERATURE: 97.5 F | DIASTOLIC BLOOD PRESSURE: 67 MMHG | HEIGHT: 63 IN | RESPIRATION RATE: 16 BRPM | OXYGEN SATURATION: 97 % | WEIGHT: 232 LBS | BODY MASS INDEX: 41.11 KG/M2 | HEART RATE: 73 BPM

## 2019-07-31 DIAGNOSIS — J45.41 MODERATE PERSISTENT ASTHMA WITH ACUTE EXACERBATION: Primary | ICD-10-CM

## 2019-07-31 RX ORDER — METHYLPREDNISOLONE 4 MG/1
TABLET ORAL
Qty: 1 DOSE PACK | Refills: 0 | Status: SHIPPED | OUTPATIENT
Start: 2019-07-31 | End: 2019-11-14 | Stop reason: ALTCHOICE

## 2019-07-31 NOTE — PATIENT INSTRUCTIONS

## 2019-07-31 NOTE — PROGRESS NOTES
Chief Complaint   Patient presents with    Asthma     flare up   :    KG Tony is a 59 y.o. female who presents for wheezing about a week ago. She has asthma. Uses albuterol q 8 hours prn. Daily and two times nightly. She is taking her Advair. the context:no sick contact but had a cold about a week ago. Associated symptoms are dry cough, SOB and wheezing. Patient denies fever, chills  CP// N/V/ diarrhea. Last asthma exacerbation about 2 years ago per patient. No prior intubation. Allergies - reviewed: Allergies   Allergen Reactions    Codeine Nausea and Vomiting    Tramadol Nausea and Vomiting       Meds - reviewed:   Current Outpatient Medications   Medication Sig Dispense Refill    methylPREDNISolone (MEDROL DOSEPACK) 4 mg tablet Per dose pack instructions 1 Dose Pack 0    chlorthalidone (HYGROTEN) 25 mg tablet Take 1 Tab by mouth daily. 90 Tab 0    cloNIDine HCl (CATAPRES) 0.2 mg tablet Take 1 Tab by mouth two (2) times a day. 180 Tab 0    albuterol (PROVENTIL HFA, VENTOLIN HFA, PROAIR HFA) 90 mcg/actuation inhaler Take 2 Puffs by inhalation every four (4) hours as needed for Wheezing. 1 Inhaler 3    glimepiride (AMARYL) 2 mg tablet Take 1 Tab by mouth every morning. 30 Tab 2    amLODIPine (NORVASC) 10 mg tablet Take 1 Tab by mouth daily. 90 Tab 3    raNITIdine (ZANTAC) 300 mg tab Take 1 Tab by mouth daily. 90 Tab 1    metFORMIN (GLUCOPHAGE) 1,000 mg tablet Take 1 Tab by mouth two (2) times daily (with meals). 180 Tab 3    losartan (COZAAR) 100 mg tablet Take 1 Tab by mouth daily. 90 Tab 3    metoprolol tartrate (LOPRESSOR) 25 mg tablet Take 1 Tab by mouth two (2) times a day. 180 Tab 3    simvastatin (ZOCOR) 40 mg tablet Take 1 Tab by mouth nightly. 90 Tab 3    glucose blood VI test strips (ONETOUCH ULTRA TEST) strip Test 4 times a day.  100 Strip 2    Blood-Glucose Meter monitoring kit Use prn for glucose checks at variety of times, fasting, 2hr pp, bedtime Diagnosis code: JUAN MANUEL 11.65 1 Kit 1    fluticasone propion-salmeterol (ADVAIR DISKUS) 100-50 mcg/dose diskus inhaler Take 1 Puff by inhalation every twelve (12) hours. 1 Inhaler 2    montelukast (SINGULAIR) 10 mg tablet Take 1 Tab by mouth daily. For asthma prevention 80 Tab 3       PMH- reviewed:  Past Medical History:   Diagnosis Date    Asthma     Diabetes ([facility] [address]     Hypertension        SH- reviewed:  Smoker:  Social History     Tobacco Use   Smoking Status Never Smoker   Smokeless Tobacco Never Used       ETOH- reviewed:   Social History     Substance and Sexual Activity   Alcohol Use No       FH- reviewed:   Family History   Problem Relation Age of Onset    Diabetes Father     Diabetes Sister          ROS: Positive for Items in bold:   Constitutional: headache, fever, fatigue, weight loss or weight gain      Oropharynx: sore throat, lesions in throat  Cardiac: chest pain      Resp: cough or shortness of breath      GI: nausea, vomiting, constipation, diarrhea, blood in stool  : frequency, urgency, dysuria, hematuria   Neuro: dizziness, numbness, tingling  Psych: anxiety, depression, stress     Physical Exam:  Visit Vitals  /67   Pulse 73   Temp 97.5 °F (36.4 °C) (Oral)   Resp 16   Ht 5' 3\" (1.6 m)   Wt 232 lb (105.2 kg)   LMP 09/02/2007   SpO2 97%   BMI 41.10 kg/m²       Gen: No apparent distress. Alert and oriented and responds to all questions appropriately. Eyes: Conjunctiva clear, extraocular movements are intact. Ear: External ears are normal. Nose: Turbinates are within normal limits. No drainage or sinus tenderness . Oropharynx: No oral lesions or exudates. Neck: Supple; no masses; no thyromegaly appreciated. No cervical LAD  Lungs: Fair air movement, diffuse expiratory wheezing. Not using accessory muscle. Speaking in full sentence. Cardio: Regular, rate, and rhythm without murmurs, gallops or rubs.       Assessment and Plan:   Rashel Perez is a 59 y.o. female who presents for moderate Asthma flair, likely related to recent URI. Will treat with medrol dose pack. SE discussed. Continue home nebs. Ed precautions given. c RTC in 2 days for follow up      ICD-10-CM ICD-9-CM    1. Moderate persistent asthma with acute exacerbation J45.41 493.92 methylPREDNISolone (MEDROL DOSEPACK) 4 mg tablet         Discussed diagnoses in detail with patient. Patient expressed understanding of and agreement to above plan. All questions and concerns addressed. Medication risks/benefits/side effects discussed with patient. Patient is counseled to return to the office and/or ED if symptoms do not improve as expected. Patient  discussed with Dr. Sammie Lesch, Attending Physician. Devika Christine MD  07/31/19    Family Medicine Resident

## 2019-07-31 NOTE — PROGRESS NOTES
I reviewed with the resident the medical history and the resident's findings on the physical examination. I discussed with the resident the patient's diagnosis and concur with the plan.     Asthma exacerbation  Steroids, reassess in 2 days, Albuterol Q4' prn

## 2019-07-31 NOTE — PROGRESS NOTES
Chief Complaint   Patient presents with    Asthma     flare up       1. Have you been to the ER, urgent care clinic since your last visit? Hospitalized since your last visit? No    2. Have you seen or consulted any other health care providers outside of the [address] [place] [address] since your last visit? Include any pap smears or colon screening.  No     Using the rescue inhaler--every 4 hours    Doesn't seem to be helping

## 2019-08-02 ENCOUNTER — OFFICE VISIT (OUTPATIENT)
Dept: FAMILY MEDICINE CLINIC | Age: 64
End: 2019-08-02

## 2019-08-02 VITALS
RESPIRATION RATE: 16 BRPM | SYSTOLIC BLOOD PRESSURE: 126 MMHG | OXYGEN SATURATION: 98 % | WEIGHT: 229 LBS | HEART RATE: 68 BPM | TEMPERATURE: 98.4 F | HEIGHT: 63 IN | BODY MASS INDEX: 40.57 KG/M2 | DIASTOLIC BLOOD PRESSURE: 75 MMHG

## 2019-08-02 DIAGNOSIS — J45.41 MODERATE PERSISTENT ASTHMA WITH ACUTE EXACERBATION: Primary | ICD-10-CM

## 2019-08-02 DIAGNOSIS — I10 HYPERTENSION GOAL BP (BLOOD PRESSURE) < 140/90: ICD-10-CM

## 2019-08-02 RX ORDER — CHLORTHALIDONE 25 MG/1
25 TABLET ORAL DAILY
Qty: 90 TAB | Refills: 3 | Status: SHIPPED | OUTPATIENT
Start: 2019-08-02 | End: 2020-05-28 | Stop reason: SDUPTHER

## 2019-08-02 NOTE — PROGRESS NOTES
Chief Complaint   Patient presents with    Follow-up     ASTHMA   :    KG Bacon is a 59 y.o. female who presents for follow up on asthma exacerbation. Has completed 2/5 days of the Medrol dose pack. Still wheezing. Breathing better only using albuterol 1-2 times a day. Needs refill on chlorthalidone taking as instructed. BP at home 150s/80- 90s. . Patient denies fever, chills  CP/ SOB/ N/V/ diarrhea. Allergies - reviewed: Allergies   Allergen Reactions    Codeine Nausea and Vomiting    Tramadol Nausea and Vomiting       Meds - reviewed:   Current Outpatient Medications   Medication Sig Dispense Refill    chlorthalidone (HYGROTEN) 25 mg tablet Take 1 Tab by mouth daily. 90 Tab 3    methylPREDNISolone (MEDROL DOSEPACK) 4 mg tablet Per dose pack instructions 1 Dose Pack 0    cloNIDine HCl (CATAPRES) 0.2 mg tablet Take 1 Tab by mouth two (2) times a day. 180 Tab 0    albuterol (PROVENTIL HFA, VENTOLIN HFA, PROAIR HFA) 90 mcg/actuation inhaler Take 2 Puffs by inhalation every four (4) hours as needed for Wheezing. 1 Inhaler 3    fluticasone propion-salmeterol (ADVAIR DISKUS) 100-50 mcg/dose diskus inhaler Take 1 Puff by inhalation every twelve (12) hours. 1 Inhaler 2    glimepiride (AMARYL) 2 mg tablet Take 1 Tab by mouth every morning. 30 Tab 2    amLODIPine (NORVASC) 10 mg tablet Take 1 Tab by mouth daily. 90 Tab 3    raNITIdine (ZANTAC) 300 mg tab Take 1 Tab by mouth daily. 90 Tab 1    metFORMIN (GLUCOPHAGE) 1,000 mg tablet Take 1 Tab by mouth two (2) times daily (with meals). 180 Tab 3    losartan (COZAAR) 100 mg tablet Take 1 Tab by mouth daily. 90 Tab 3    metoprolol tartrate (LOPRESSOR) 25 mg tablet Take 1 Tab by mouth two (2) times a day. 180 Tab 3    simvastatin (ZOCOR) 40 mg tablet Take 1 Tab by mouth nightly. 90 Tab 3    glucose blood VI test strips (ONETOUCH ULTRA TEST) strip Test 4 times a day.  100 Strip 2    Blood-Glucose Meter monitoring kit Use prn for glucose checks at variety of times, fasting, 2hr pp, bedtime Diagnosis code: E 11.65 1 Kit 1    montelukast (SINGULAIR) 10 mg tablet Take 1 Tab by mouth daily. For asthma prevention 80 Tab 3       PMH- reviewed:  Past Medical History:   Diagnosis Date    Asthma     Diabetes ([address]     Hypertension        SH- reviewed:  Smoker:  Social History     Tobacco Use   Smoking Status Never Smoker   Smokeless Tobacco Never Used       ETOH- reviewed:   Social History     Substance and Sexual Activity   Alcohol Use No       FH- reviewed:   Family History   Problem Relation Age of Onset    Diabetes Father     Diabetes Sister          ROS: Positive for Items in bold:   Constitutional: headache, fever, fatigue, weight loss or weight gain      Cardiac: chest pain      Resp: cough or shortness of breath      GI: nausea, vomiting, constipation, diarrhea, blood in stool  : frequency, urgency, dysuria, hematuria   Neuro: dizziness, numbness, tingling  Psych: anxiety, depression, stress     Physical Exam:  Visit Vitals  /75   Pulse 68   Temp 98.4 °F (36.9 °C) (Oral)   Resp 16   Ht 5' 3\" (1.6 m)   Wt 229 lb (103.9 kg)   LMP 09/02/2007   SpO2 98%   BMI 40.57 kg/m²       Gen: No apparent distress. Alert and oriented and responds to all questions appropriately. Oropharynx: No oral lesions or exudates. Neck: Supple; no masses; no thyromegaly appreciated. No cervical LAD  Lungs: Respirations unlabored; good air movement. Faint expiratory wheezing. Cardio: Regular, rate, and rhythm without murmurs, gallops or rubs   Abdomen: Soft; nontender; nondistended; normoactive bowel sounds; no masses or organomegaly  Neurologic: No focal neurologic deficits. Strength and sensation grossly intact. Coordination and gait grossly intact. Ext: Well perfused. No edema. Skin: No obvious rashes.       Lab Results   Component Value Date/Time    Sodium 140 05/06/2019 09:30 AM    Potassium 4.0 05/06/2019 09:30 AM    Chloride 97 05/06/2019 09:30 AM    CO2 27 05/06/2019 09:30 AM    Glucose 178 (H) 05/06/2019 09:30 AM    BUN 17 05/06/2019 09:30 AM    Creatinine 0.77 05/06/2019 09:30 AM    BUN/Creatinine ratio 22 05/06/2019 09:30 AM    GFR est AA 94 05/06/2019 09:30 AM    GFR est non-AA 82 05/06/2019 09:30 AM    Calcium 9.7 05/06/2019 09:30 AM     Lab Results   Component Value Date/Time    Cholesterol, total 152 02/27/2019 09:23 AM    HDL Cholesterol 87 02/27/2019 09:23 AM    LDL,Direct 97 11/27/2018 10:09 AM    LDL, calculated 57 02/27/2019 09:23 AM    VLDL, calculated 8 02/27/2019 09:23 AM    Triglyceride 42 02/27/2019 09:23 AM     Lab Results   Component Value Date/Time    Hemoglobin A1c 8.6 (H) 02/27/2019 09:23 AM    Hemoglobin A1c (POC) 8.4 03/09/2017 04:04 PM       I have personally reviewed the labs results        Assessment and Plan:   Roslyn Mackenzie is a 59 y.o. female who presents for follow up on asthma exacerbation. Feeling better. Complete course of treatment. BP well controlled. Will give her the refill on chlorthalidone. ICD-10-CM ICD-9-CM    1. Moderate persistent asthma with acute exacerbation J45.41 493.92    2. Hypertension goal BP (blood pressure) < 140/90 I10 401.9 chlorthalidone (HYGROTEN) 25 mg tablet         Discussed diagnoses in detail with patient. Patient expressed understanding of and agreement to above plan. All questions and concerns addressed. Medication risks/benefits/side effects discussed with patient. Patient is counseled to return to the office and/or ED if symptoms do not improve as expected. Patient  discussed with Dr. Luís Oneill, Attending Physician. Devika Christine MD  08/02/19    Family Medicine Resident

## 2019-08-02 NOTE — PROGRESS NOTES
Chief Complaint   Patient presents with    Follow-up     ASTHMA     1. Have you been to the ER, urgent care clinic since your last visit? Hospitalized since your last visit? No    2. Have you seen or consulted any other health care providers outside of the [address] [place] [address] since your last visit? Include any pap smears or colon screening.  No    Has about two days left--used rescue inhaler    Chlorthalidone refill

## 2019-08-03 NOTE — PROGRESS NOTES
I saw and evaluated the patient, performing the key elements of the service. I discussed the findings, assessment and plan with the resident and agree with the resident's findings and plan as documented in the resident's note. Patient reports significant improvement. Breathing comfortably on room air. Lungs clear.

## 2019-08-22 DIAGNOSIS — I10 HYPERTENSION GOAL BP (BLOOD PRESSURE) < 140/90: ICD-10-CM

## 2019-08-23 RX ORDER — CLONIDINE HYDROCHLORIDE 0.2 MG/1
0.2 TABLET ORAL 2 TIMES DAILY
Qty: 180 TAB | Refills: 0 | OUTPATIENT
Start: 2019-08-23

## 2019-08-23 RX ORDER — CLONIDINE HYDROCHLORIDE 0.2 MG/1
0.2 TABLET ORAL 2 TIMES DAILY
Qty: 180 TAB | Refills: 0 | Status: SHIPPED | OUTPATIENT
Start: 2019-08-23 | End: 2019-11-22 | Stop reason: SDUPTHER

## 2019-10-07 DIAGNOSIS — J45.40 MODERATE PERSISTENT ASTHMA WITHOUT COMPLICATION: ICD-10-CM

## 2019-10-08 RX ORDER — ALBUTEROL SULFATE 90 UG/1
2 AEROSOL, METERED RESPIRATORY (INHALATION)
Qty: 1 INHALER | Refills: 3 | Status: SHIPPED | OUTPATIENT
Start: 2019-10-08 | End: 2020-09-12 | Stop reason: SDUPTHER

## 2019-10-25 ENCOUNTER — TELEPHONE (OUTPATIENT)
Dept: FAMILY MEDICINE CLINIC | Age: 64
End: 2019-10-25

## 2019-10-25 DIAGNOSIS — J45.41 MODERATE PERSISTENT ASTHMA WITH ACUTE EXACERBATION: ICD-10-CM

## 2019-10-25 NOTE — TELEPHONE ENCOUNTER
----- Message from Devorah Song sent at 10/25/2019  9:48 AM EDT -----  Regarding: Dr. Grace Ray refill  Medication Refill    Caller (if not patient):      Relationship of caller (if not patient):      Best contact number(s):  [phone]    Name of medication and dosage if known:  prednisone    Is patient out of this medication (yes/no):  YES    Pharmacy name:  [facility]  Pharmacy listed in chart? (yes/no): yes  Pharmacy phone number:      Details to clarify the request:      Devorah Song

## 2019-10-29 RX ORDER — METHYLPREDNISOLONE 4 MG/1
TABLET ORAL
Qty: 1 DOSE PACK | Refills: 0 | Status: CANCELLED | OUTPATIENT
Start: 2019-10-29

## 2019-10-30 NOTE — TELEPHONE ENCOUNTER
Please schedule patient an appointment for this per Dr. Anuradha Dhaliwal last note patient to follow up around 11/2. Thanks!

## 2019-10-31 ENCOUNTER — OFFICE VISIT (OUTPATIENT)
Dept: FAMILY MEDICINE CLINIC | Age: 64
End: 2019-10-31

## 2019-10-31 VITALS
HEART RATE: 69 BPM | BODY MASS INDEX: 40.4 KG/M2 | HEIGHT: 63 IN | DIASTOLIC BLOOD PRESSURE: 61 MMHG | SYSTOLIC BLOOD PRESSURE: 97 MMHG | WEIGHT: 228 LBS | RESPIRATION RATE: 16 BRPM | OXYGEN SATURATION: 100 % | TEMPERATURE: 98.6 F

## 2019-10-31 DIAGNOSIS — Z23 ENCOUNTER FOR IMMUNIZATION: ICD-10-CM

## 2019-10-31 DIAGNOSIS — J45.40 MODERATE PERSISTENT ASTHMA WITHOUT COMPLICATION: Primary | ICD-10-CM

## 2019-10-31 RX ORDER — FLUTICASONE PROPIONATE AND SALMETEROL 100; 50 UG/1; UG/1
1 POWDER RESPIRATORY (INHALATION) EVERY 12 HOURS
Qty: 1 INHALER | Refills: 2 | Status: SHIPPED | OUTPATIENT
Start: 2019-10-31 | End: 2019-11-14 | Stop reason: SDUPTHER

## 2019-10-31 NOTE — PROGRESS NOTES
Chief Complaint   Patient presents with    Medication Refill     Here for methylprednisolone refill. Told to come in for refill. Pt states medicine was relieving congestion symptoms. No other issues. 1. Have you been to the ER, urgent care clinic since your last visit? Hospitalized since your last visit? No    2. Have you seen or consulted any other health care providers outside of the [address] [place] [address] since your last visit? Include any pap smears or colon screening.   No

## 2019-10-31 NOTE — PROGRESS NOTES
Chief Complaint   Patient presents with    Medication Refill     Here for methylprednisolone refill. Told to come in for refill. Pt states medicine was relieving congestion symptoms. No other issues.

## 2019-10-31 NOTE — PROGRESS NOTES
Chief Complaint   Patient presents with    Medication Refill     Here for methylprednisolone refill. Told to come in for refill. Pt states medicine was relieving congestion symptoms. No other issues.   :    KG Whitaker is a 59 y.o. female who presents for a wheezing for a couple weeks, almost daily. Cough is getting better. Has been using the albuterol about 3 times a day. Wheezing does wake her up weekly at night    Asthma is worsened by change in weather or secondary exposure to smoking. His nephew smokes who lives with him  and relieved by Albuterol. She ran out of her Advair. Associated symptoms are mild SOB,Patient denies fever, chills  CP/ / N/V/ diarrhea. Allergies - reviewed: Allergies   Allergen Reactions    Codeine Nausea and Vomiting    Tramadol Nausea and Vomiting       Meds - reviewed:   Current Outpatient Medications   Medication Sig Dispense Refill    fluticasone propion-salmeterol (ADVAIR DISKUS) 100-50 mcg/dose diskus inhaler Take 1 Puff by inhalation every twelve (12) hours. 1 Inhaler 2    albuterol (PROVENTIL HFA, VENTOLIN HFA, PROAIR HFA) 90 mcg/actuation inhaler Take 2 Puffs by inhalation every four (4) hours as needed for Wheezing. 1 Inhaler 3    cloNIDine HCl (CATAPRES) 0.2 mg tablet Take 1 Tab by mouth two (2) times a day. 180 Tab 0    chlorthalidone (HYGROTEN) 25 mg tablet Take 1 Tab by mouth daily. 90 Tab 3    methylPREDNISolone (MEDROL DOSEPACK) 4 mg tablet Per dose pack instructions 1 Dose Pack 0    glimepiride (AMARYL) 2 mg tablet Take 1 Tab by mouth every morning. 30 Tab 2    amLODIPine (NORVASC) 10 mg tablet Take 1 Tab by mouth daily. 90 Tab 3    raNITIdine (ZANTAC) 300 mg tab Take 1 Tab by mouth daily. 90 Tab 1    montelukast (SINGULAIR) 10 mg tablet Take 1 Tab by mouth daily. For asthma prevention 90 Tab 3    metFORMIN (GLUCOPHAGE) 1,000 mg tablet Take 1 Tab by mouth two (2) times daily (with meals).  180 Tab 3    losartan (COZAAR) 100 mg tablet Take 1 Tab by mouth daily. 90 Tab 3    metoprolol tartrate (LOPRESSOR) 25 mg tablet Take 1 Tab by mouth two (2) times a day. 180 Tab 3    simvastatin (ZOCOR) 40 mg tablet Take 1 Tab by mouth nightly. 90 Tab 3    glucose blood VI test strips (ONETOUCH ULTRA TEST) strip Test 4 times a day. 100 Strip 2    Blood-Glucose Meter monitoring kit Use prn for glucose checks at variety of times, fasting, 2hr pp, bedtime Diagnosis code: E 11.65 1 Kit 1       PMH- reviewed:  Past Medical History:   Diagnosis Date    Asthma     Diabetes ([facility] [address]     Hypertension        SH- reviewed:  Smoker:  Social History     Tobacco Use   Smoking Status Never Smoker   Smokeless Tobacco Never Used       ETOH- reviewed:   Social History     Substance and Sexual Activity   Alcohol Use No       FH- reviewed:   Family History   Problem Relation Age of Onset    Diabetes Father     Diabetes Sister          ROS: Positive for Items in bold:   Constitutional: headache, fever, fatigue, weight loss or weight gain      Cardiac: chest pain      Resp: cough or shortness of breath      GI: nausea, vomiting, constipation, diarrhea, blood in stool  : frequency, urgency, dysuria, hematuria   Neuro: dizziness, numbness, tingling  Psych: anxiety, depression, stress     Physical Exam:    Visit Vitals  BP 97/61 (BP 1 Location: Right arm, BP Patient Position: Sitting)   Pulse 69   Temp 98.6 °F (37 °C) (Oral)   Resp 16   Ht 5' 3\" (1.6 m)   Wt 228 lb (103.4 kg)   LMP 09/02/2007   SpO2 100%   BMI 40.39 kg/m²        Gen: No apparent distress. Alert and oriented and responds to all questions appropriately. Eyes: Conjunctiva clear, extraocular movements are intact. Lungs: Respirations unlabored; mild expiratory wheezes. Cardio: Regular, rate, and rhythm without murmurs, gallops or rubs   Abdomen: Soft; nontender; nondistended; normoactive bowel sounds; no masses or organomegaly  Neurologic: No focal neurologic deficits. Strength and sensation grossly intact. Coordination and gait grossly intact. Ext: Well perfused. No edema. Skin: No obvious rashes. Lab Results   Component Value Date/Time    Sodium 140 05/06/2019 09:30 AM    Potassium 4.0 05/06/2019 09:30 AM    Chloride 97 05/06/2019 09:30 AM    CO2 27 05/06/2019 09:30 AM    Glucose 178 (H) 05/06/2019 09:30 AM    BUN 17 05/06/2019 09:30 AM    Creatinine 0.77 05/06/2019 09:30 AM    BUN/Creatinine ratio 22 05/06/2019 09:30 AM    GFR est AA 94 05/06/2019 09:30 AM    GFR est non-AA 82 05/06/2019 09:30 AM    Calcium 9.7 05/06/2019 09:30 AM     Lab Results   Component Value Date/Time    Cholesterol, total 152 02/27/2019 09:23 AM    HDL Cholesterol 87 02/27/2019 09:23 AM    LDL,Direct 97 11/27/2018 10:09 AM    LDL, calculated 57 02/27/2019 09:23 AM    VLDL, calculated 8 02/27/2019 09:23 AM    Triglyceride 42 02/27/2019 09:23 AM     Lab Results   Component Value Date/Time    Hemoglobin A1c 8.6 (H) 02/27/2019 09:23 AM    Hemoglobin A1c (POC) 8.4 03/09/2017 04:04 PM       I have personally reviewed the labs results    No results found for this or any previous visit (from the past 12 hour(s)). Assessment and Plan:   Meenakshi Cornell is a 59 y.o. female who presents for follow up on asthma. Patient does not seem to be in acute exacerbation. Will resume Advair. Note added to pharmacy if cost is a concern to switch to Symbicort or similar product. RTC in one week for follow up for diabetes or sooner prn if asthma worsens. ICD-10-CM ICD-9-CM    1. Moderate persistent asthma without complication M86.93 074.60 fluticasone propion-salmeterol (ADVAIR DISKUS) 100-50 mcg/dose diskus inhaler       Discussed diagnoses in detail with patient. Patient expressed understanding of and agreement to above plan. All questions and concerns addressed. Medication risks/benefits/side effects discussed with patient. Patient is counseled to return to the office and/or ED if symptoms do not improve as expected.       Patient discussed with Dr. Tracie Leon, Attending Physician. Devika Christine MD  10/31/19    Family Medicine Resident

## 2019-11-01 NOTE — PROGRESS NOTES
[address] Medicine Residency Attending Addendum:  Patient encounter was discussed on the day of the encounter with Devika Christine MD, performing the key elements of the service. I discussed the findings, assessment and plan with the resident and agree with the resident's findings and plan as documented in the resident's note.       Hi Gutierrez MD, CAQSM, RMSK

## 2019-11-14 ENCOUNTER — OFFICE VISIT (OUTPATIENT)
Dept: FAMILY MEDICINE CLINIC | Age: 64
End: 2019-11-14

## 2019-11-14 ENCOUNTER — HOSPITAL ENCOUNTER (OUTPATIENT)
Dept: LAB | Age: 64
Discharge: HOME | End: 2019-11-14

## 2019-11-14 VITALS
SYSTOLIC BLOOD PRESSURE: 126 MMHG | TEMPERATURE: 98 F | OXYGEN SATURATION: 100 % | DIASTOLIC BLOOD PRESSURE: 71 MMHG | WEIGHT: 230.2 LBS | BODY MASS INDEX: 40.79 KG/M2 | RESPIRATION RATE: 18 BRPM | HEIGHT: 63 IN | HEART RATE: 72 BPM

## 2019-11-14 DIAGNOSIS — J45.41 MODERATE PERSISTENT ASTHMA WITH ACUTE EXACERBATION: ICD-10-CM

## 2019-11-14 DIAGNOSIS — E11.9 DIABETES MELLITUS WITHOUT COMPLICATION (HCC): Primary | ICD-10-CM

## 2019-11-14 DIAGNOSIS — Z12.31 SCREENING MAMMOGRAM, ENCOUNTER FOR: ICD-10-CM

## 2019-11-14 DIAGNOSIS — E11.9 DIABETES MELLITUS WITHOUT COMPLICATION (HCC): ICD-10-CM

## 2019-11-14 DIAGNOSIS — I10 ESSENTIAL HYPERTENSION: ICD-10-CM

## 2019-11-14 LAB
CREAT UR-MCNC: 109 MG/DL
MICROALBUMIN UR-MCNC: 0.54 MG/DL
MICROALBUMIN/CREAT UR-RTO: 5 MG/G (ref 0–30)

## 2019-11-14 RX ORDER — FLUTICASONE PROPIONATE AND SALMETEROL 55; 14 UG/1; UG/1
1 POWDER, METERED RESPIRATORY (INHALATION) 2 TIMES DAILY
Qty: 1 EACH | Refills: 2 | Status: SHIPPED | OUTPATIENT
Start: 2019-11-14 | End: 2021-11-05

## 2019-11-14 RX ORDER — METFORMIN HYDROCHLORIDE 1000 MG/1
1000 TABLET ORAL 2 TIMES DAILY WITH MEALS
Qty: 180 TAB | Refills: 3 | Status: SHIPPED | OUTPATIENT
Start: 2019-11-14 | End: 2020-08-03 | Stop reason: SDUPTHER

## 2019-11-14 RX ORDER — SIMVASTATIN 40 MG/1
40 TABLET, FILM COATED ORAL
Qty: 90 TAB | Refills: 3 | Status: SHIPPED | OUTPATIENT
Start: 2019-11-14 | End: 2020-08-03 | Stop reason: SDUPTHER

## 2019-11-14 RX ORDER — AMLODIPINE BESYLATE 10 MG/1
10 TABLET ORAL DAILY
Qty: 90 TAB | Refills: 3 | Status: SHIPPED | OUTPATIENT
Start: 2019-11-14 | End: 2020-02-20 | Stop reason: SDUPTHER

## 2019-11-14 RX ORDER — LOSARTAN POTASSIUM 100 MG/1
100 TABLET ORAL DAILY
Qty: 90 TAB | Refills: 3 | Status: SHIPPED | OUTPATIENT
Start: 2019-11-14 | End: 2020-08-03 | Stop reason: SDUPTHER

## 2019-11-14 RX ORDER — GLIMEPIRIDE 2 MG/1
2 TABLET ORAL
Qty: 30 TAB | Refills: 2 | Status: SHIPPED | OUTPATIENT
Start: 2019-11-14 | End: 2020-07-14

## 2019-11-14 NOTE — PROGRESS NOTES
Subjective: Latosha Deluca is a 59 y.o. female who presents for follow up of diabetes. she is on Metformin and glimeride  taking medications as instructed, no TIA's, no chest pain on exertion, no dyspnea on exertion, no swelling of ankles. Diet and Lifestyle: generally follows a low fat low cholesterol diet  Glucoses not checked at home    New concerns: Breathing is better, but she has not been able to buy the Advair due to cost.       Review of Systems, additional:  Eyes: negative for visual disturbance and redness  Respiratory: negative for sputum or wheezing  Cardiovascular: negative for dyspnea, palpitations, irregular heart beats, exertional chest pressure/discomfort        Patient Active Problem List   Diagnosis Code    [facility] Q36.5    Essential hypertension I10    RAD (reactive airway disease) J45.909    Facial droop R29.810    S/P THAO-BSO Z90.710, Z90.722, Z90.79    H/O colonoscopy Z98.890    OA (osteoarthritis) M19.90    Gout M10.9    Obesity, morbid (HCC) E66.01       Current Outpatient Medications   Medication Sig    metFORMIN (GLUCOPHAGE) 1,000 mg tablet Take 1 Tab by mouth two (2) times daily (with meals).  amLODIPine (NORVASC) 10 mg tablet Take 1 Tab by mouth daily.  simvastatin (ZOCOR) 40 mg tablet Take 1 Tab by mouth nightly. Indications: excessive fat in the blood    losartan (COZAAR) 100 mg tablet Take 1 Tab by mouth daily.  fluticasone propion-salmeterol (AIRDUO RESPICLICK) 65-84 mcg/actuation aepb Take 1 Inhalation by inhalation two (2) times a day.  glimepiride (AMARYL) 2 mg tablet Take 1 Tab by mouth every morning.  albuterol (PROVENTIL HFA, VENTOLIN HFA, PROAIR HFA) 90 mcg/actuation inhaler Take 2 Puffs by inhalation every four (4) hours as needed for Wheezing.  cloNIDine HCl (CATAPRES) 0.2 mg tablet Take 1 Tab by mouth two (2) times a day.  chlorthalidone (HYGROTEN) 25 mg tablet Take 1 Tab by mouth daily.     raNITIdine (ZANTAC) 300 mg tab Take 1 Tab by mouth daily.  montelukast (SINGULAIR) 10 mg tablet Take 1 Tab by mouth daily. For asthma prevention    metoprolol tartrate (LOPRESSOR) 25 mg tablet Take 1 Tab by mouth two (2) times a day.  glucose blood VI test strips (ONETOUCH ULTRA TEST) strip Test 4 times a day.  Blood-Glucose Meter monitoring kit Use prn for glucose checks at variety of times, fasting, 2hr pp, bedtime Diagnosis code: E 11.65     No current facility-administered medications for this visit. Allergies   Allergen Reactions    Codeine Nausea and Vomiting    Tramadol Nausea and Vomiting           Social History     Tobacco Use    Smoking status: Never Smoker    Smokeless tobacco: Never Used   Substance Use Topics    Alcohol use: No          Objective:     Visit Vitals  /71 (BP 1 Location: Right arm, BP Patient Position: Sitting)   Pulse 72   Temp 98 °F (36.7 °C) (Oral)   Resp 18   Ht 5' 3\" (1.6 m)   Wt 230 lb 3.2 oz (104.4 kg)   LMP 09/02/2007   SpO2 100%   BMI 40.78 kg/m²     Body mass index is 40.78 kg/m².     Physical Exam  General appearance: alert, cooperative, no distress, appears stated age  Lungs: clear to auscultation bilaterally, no wheezes, no increased work of breathing  Heart: regular rate and rhythm, S1, S2 normal, no murmur, click, rub or gallop  Extremities: extremities normal, no cyanosis or edema  Skin: color, texture, turgor normal. No rashes or lesions  Neurologic: Alert and oriented, grossly normal exam. Normal coordination and gait  Feet: normal monofilament test negative, minimal callus      Lab results below reviewed at this visit:      Lab Results   Component Value Date/Time    Hemoglobin A1c 8.6 (H) 02/27/2019 09:23 AM    Hemoglobin A1c (POC) 8.4 03/09/2017 04:04 PM       Lab Results   Component Value Date/Time    Sodium 140 05/06/2019 09:30 AM    Potassium 4.0 05/06/2019 09:30 AM    Chloride 97 05/06/2019 09:30 AM    CO2 27 05/06/2019 09:30 AM Glucose 178 (H) 05/06/2019 09:30 AM    BUN 17 05/06/2019 09:30 AM    Creatinine 0.77 05/06/2019 09:30 AM    BUN/Creatinine ratio 22 05/06/2019 09:30 AM    GFR est AA 94 05/06/2019 09:30 AM    GFR est non-AA 82 05/06/2019 09:30 AM    Calcium 9.7 05/06/2019 09:30 AM       Lab Results   Component Value Date/Time    Microalb/Creat ratio (ug/mg creat.) <5.0 08/19/2016 09:18 AM       Lab Results   Component Value Date/Time    Cholesterol, total 152 02/27/2019 09:23 AM    HDL Cholesterol 87 02/27/2019 09:23 AM    LDL,Direct 97 11/27/2018 10:09 AM    LDL, calculated 57 02/27/2019 09:23 AM    VLDL, calculated 8 02/27/2019 09:23 AM    Triglyceride 42 02/27/2019 09:23 AM         Assessment/Plan:       ICD-10-CM ICD-9-CM    1. Diabetes mellitus without complication ([facility]) T86.0 250.00 MICROALBUMIN, UR, RAND W/ MICROALB/CREAT RATIO       DIABETES FOOT EXAM      METABOLIC PANEL, BASIC      HEMOGLOBIN A1C W/O EAG      metFORMIN (GLUCOPHAGE) 1,000 mg tablet      simvastatin (ZOCOR) 40 mg tablet      REFERRAL TO OPHTHALMOLOGY      glimepiride (AMARYL) 2 mg tablet    Has been a long time since A1c. Rechecking A1c as well as a B12. Increasing Metformin. 2. Essential hypertension I10 401.9 amLODIPine (NORVASC) 10 mg tablet      losartan (COZAAR) 100 mg tablet   3. Screening mammogram, encounter for Z12.31 V76.12 [facility] MAMMO BI SCREENING INCL CAD   4. Moderate persistent asthma with acute exacerbation J45.41 493.92    5. BMI 40.0-44.9, adult ([facility]) Z68.41 V85.41        There are no diagnoses linked to this encounter. Diabetes: Discussed with patient today that the goal for their diabetes is to have a HgA1C<7 . we discussed the need for yearly eye exams and to take care of their feet daily.  Patient will make those appointment with podiatry. - continue Metformin and glimeperide  - continue statins, pneumonia shot, and ACEIs    Hyperlipidemia:   -Continue Zocor every day    Hypertension:   - continue current meds.  meds refilled  Advised the patient to meassure blood pressure at home and to bring logs at the next visit. Advised to be compliant with BP medication. DASH diet - includes fruits, vegetables, fiber, and less than 2000 mg sodium (salt) daily.      Asthma: not in acute exacerbation. Could not afford Advair, will switch to DuoAir ( generic)     We discussed exercise, low fat, low cholesterol, low carb diet, weight loss and medications compliance including continuous statin use. Patient is counseled to return to the office if symptoms do not improve as expected.  Urgent consultation with the nearest Emergency Department is strongly recommended if condition worsens.  Patient is counseled to follow up as recommended and to inform the office if any changes in treatment are recommended.       Follow-up and Dispositions    · Return if symptoms worsen or fail to improve.

## 2019-11-14 NOTE — PROGRESS NOTES
59year old female -- was seen previously for asthma    Started on combination inhaler but unable to afford it    Here today for diabetes follow up    On metformin and glipizide    Last HgbA1C = 8.6 2/19  Checking labs today    I reviewed with the resident the medical history and the resident's findings on the physical examination. I discussed with the resident the patient's diagnosis and concur with the plan.

## 2019-11-14 NOTE — PROGRESS NOTES
Chief Complaint   Patient presents with    Follow-up     labwork     1. Have you been to the ER, urgent care clinic since your last visit? Hospitalized since your last visit? No    2. Have you seen or consulted any other health care providers outside of the [address] [place] [address] since your last visit? Include any pap smears or colon screening.  No    Fasting for labwork

## 2019-11-22 DIAGNOSIS — I10 HYPERTENSION GOAL BP (BLOOD PRESSURE) < 140/90: ICD-10-CM

## 2019-11-22 DIAGNOSIS — I10 ESSENTIAL HYPERTENSION: ICD-10-CM

## 2019-11-25 DIAGNOSIS — I10 ESSENTIAL HYPERTENSION: ICD-10-CM

## 2019-11-25 RX ORDER — METOPROLOL TARTRATE 25 MG/1
25 TABLET, FILM COATED ORAL 2 TIMES DAILY
Qty: 180 TAB | Refills: 3 | Status: SHIPPED | OUTPATIENT
Start: 2019-11-25 | End: 2020-08-03 | Stop reason: SDUPTHER

## 2019-11-25 RX ORDER — CLONIDINE HYDROCHLORIDE 0.2 MG/1
0.2 TABLET ORAL 2 TIMES DAILY
Qty: 180 TAB | Refills: 0 | Status: SHIPPED | OUTPATIENT
Start: 2019-11-25 | End: 2020-02-24 | Stop reason: SDUPTHER

## 2019-11-26 RX ORDER — AMLODIPINE BESYLATE 10 MG/1
10 TABLET ORAL DAILY
Qty: 90 TAB | Refills: 3 | OUTPATIENT
Start: 2019-11-26

## 2020-02-14 ENCOUNTER — OFFICE VISIT (OUTPATIENT)
Dept: FAMILY MEDICINE CLINIC | Age: 65
End: 2020-02-14

## 2020-02-14 VITALS
SYSTOLIC BLOOD PRESSURE: 123 MMHG | OXYGEN SATURATION: 100 % | WEIGHT: 228 LBS | HEIGHT: 63 IN | RESPIRATION RATE: 16 BRPM | BODY MASS INDEX: 40.4 KG/M2 | HEART RATE: 71 BPM | DIASTOLIC BLOOD PRESSURE: 58 MMHG | TEMPERATURE: 98.1 F

## 2020-02-14 DIAGNOSIS — K21.9 GASTROESOPHAGEAL REFLUX DISEASE WITHOUT ESOPHAGITIS: ICD-10-CM

## 2020-02-14 DIAGNOSIS — J01.10 ACUTE NON-RECURRENT FRONTAL SINUSITIS: Primary | ICD-10-CM

## 2020-02-14 DIAGNOSIS — J45.40 MODERATE PERSISTENT ASTHMA WITHOUT COMPLICATION: ICD-10-CM

## 2020-02-14 RX ORDER — FAMOTIDINE 40 MG/1
40 TABLET, FILM COATED ORAL DAILY
Qty: 90 TAB | Refills: 3 | Status: SHIPPED | OUTPATIENT
Start: 2020-02-14 | End: 2020-05-14

## 2020-02-14 RX ORDER — PREDNISONE 10 MG/1
TABLET ORAL
Qty: 10 TAB | Refills: 0 | Status: SHIPPED | OUTPATIENT
Start: 2020-02-14 | End: 2020-08-03

## 2020-02-14 RX ORDER — GUAIFENESIN 600 MG/1
600 TABLET, EXTENDED RELEASE ORAL 2 TIMES DAILY
Qty: 28 TAB | Refills: 0 | Status: SHIPPED | OUTPATIENT
Start: 2020-02-14 | End: 2020-02-28

## 2020-02-14 RX ORDER — AMOXICILLIN AND CLAVULANATE POTASSIUM 875; 125 MG/1; MG/1
1 TABLET, FILM COATED ORAL EVERY 12 HOURS
Qty: 10 TAB | Refills: 0 | Status: SHIPPED | OUTPATIENT
Start: 2020-02-14 | End: 2020-02-19

## 2020-02-14 RX ORDER — MONTELUKAST SODIUM 10 MG/1
10 TABLET ORAL DAILY
Qty: 90 TAB | Refills: 3 | Status: SHIPPED | OUTPATIENT
Start: 2020-02-14 | End: 2021-04-28 | Stop reason: SDUPTHER

## 2020-02-14 NOTE — PATIENT INSTRUCTIONS
Sinusitis: Care Instructions  Your Care Instructions    Sinusitis is an infection of the lining of the sinus cavities in your head. Sinusitis often follows a cold. It causes pain and pressure in your head and face. In most cases, sinusitis gets better on its own in 1 to 2 weeks. But some mild symptoms may last for several weeks. Sometimes antibiotics are needed. Follow-up care is a key part of your treatment and safety. Be sure to make and go to all appointments, and call your doctor if you are having problems. It's also a good idea to know your test results and keep a list of the medicines you take. How can you care for yourself at home? · Take an over-the-counter pain medicine, such as acetaminophen (Tylenol), ibuprofen (Advil, Motrin), or naproxen (Aleve). Read and follow all instructions on the label. · If the doctor prescribed antibiotics, take them as directed. Do not stop taking them just because you feel better. You need to take the full course of antibiotics. · Be careful when taking over-the-counter cold or flu medicines and Tylenol at the same time. Many of these medicines have acetaminophen, which is Tylenol. Read the labels to make sure that you are not taking more than the recommended dose. Too much acetaminophen (Tylenol) can be harmful. · Breathe warm, moist air from a steamy shower, a hot bath, or a sink filled with hot water. Avoid cold, dry air. Using a humidifier in your home may help. Follow the directions for cleaning the machine. · Use saline (saltwater) nasal washes to help keep your nasal passages open and wash out mucus and bacteria. You can buy saline nose drops at a grocery store or drugstore. Or you can make your own at home by adding 1 teaspoon of salt and 1 teaspoon of baking soda to 2 cups of distilled water. If you make your own, fill a bulb syringe with the solution, insert the tip into your nostril, and squeeze gently. Melvia Creamer your nose.   · Put a hot, wet towel or a warm gel pack on your face 3 or 4 times a day for 5 to 10 minutes each time. · Try a decongestant nasal spray like oxymetazoline (Afrin). Do not use it for more than 3 days in a row. Using it for more than 3 days can make your congestion worse. When should you call for help? Call your doctor now or seek immediate medical care if:    · You have new or worse swelling or redness in your face or around your eyes.     · You have a new or higher fever.    Watch closely for changes in your health, and be sure to contact your doctor if:    · You have new or worse facial pain.     · The mucus from your nose becomes thicker (like pus) or has new blood in it.     · You are not getting better as expected. Where can you learn more? Go to http://mateusz-tsering.info/. Enter U575 in the search box to learn more about \"Sinusitis: Care Instructions. \"  Current as of: October 21, 2018  Content Version: 12.2  © 0649-6306 [employer]. Care instructions adapted under license by [employer] (which disclaims liability or warranty for this information). If you have questions about a medical condition or this instruction, always ask your healthcare professional. [place] [address] any warranty or liability for your use of this information. Learning About Asthma Triggers  What are asthma triggers? When you have asthma, certain things can make your symptoms worse. These are called triggers. Learn what triggers an asthma attack for you, and avoid the triggers when you can. Common triggers include colds, smoke, air pollution, dust, pollen, pets, stress, and cold air. How do asthma triggers affect you? Triggers can make it harder for your lungs to work as they should. They can lead to sudden breathing problems and other symptoms. When you are around a trigger, an asthma attack is more likely.  If your symptoms are severe, you may need emergency treatment or have to go to the hospital for treatment. What can you do to avoid triggers? The first thing is to know your triggers. When you are having symptoms, note the things around you that might be causing them. Then look for patterns that may be triggering your symptoms. Record your triggers on a piece of paper or in an asthma diary. When you have your list of possible triggers, work with your doctor to find ways to avoid them. Avoid colds and flu. Get a pneumococcal vaccine shot. If you have had one before, ask your doctor whether you need a second dose. Get a flu vaccine every year, as soon as it's available. If you must be around people with colds or the flu, wash your hands often. Here are some ways to avoid a few common triggers. · Do not smoke or allow others to smoke around you. If you need help quitting, talk to your doctor about stop-smoking programs and medicines. These can increase your chances of quitting for good. · If there is a lot of pollution, pollen, or dust outside, stay at home and keep your windows closed. Use an air conditioner or air filter in your home. Check your local weather report or newspaper for air quality and pollen reports. What else should you know? · Take your controller medicine every day, not just when you have symptoms. It helps prevent problems before they occur. · Your doctor may suggest that you check how well your lungs are working by measuring your peak expiratory flow (PEF) throughout the day. Your PEF may drop when you are near things that trigger symptoms. Where can you learn more? Go to http://mateusz-tsering.info/. Enter Y784 in the search box to learn more about \"Learning About Asthma Triggers. \"  Current as of: June 9, 2019  Content Version: 12.2  © 0690-0755 [employer]. Care instructions adapted under license by [employer] (which disclaims liability or warranty for this information).  If you have questions about a medical condition or this instruction, always ask your healthcare professional. [place] [address] any warranty or liability for your use of this information.

## 2020-02-14 NOTE — PROGRESS NOTES
Joseph Dunham is a 59 y.o. female who had concerns including Asthma and Cough. HPI:    Upper Respiratory Symptoms:  Started feeling ill last Friday. Feeling worse since it started. Having sore throat, headaches. Feeling short of breath with ambulation, not short of breath at rest.  Has been using her inhaler, but is out of her Singulair. Cough productive of yellow sputum. Denied fever. Admits to pressure in the face. Sinus pressure and congestion with clear nasal discharge. Ear pain and pressure. Has tried Tylenol as she was worried about taking any other medications with her high blood pressure. No sick contacts, did get flu shot this year. GERD:  Zantac was taken off the market, so she would like a replacement. No nausea, vomiting, diarrhea. No body aches or fevers/chills. ROS: (positive in bold)  General: wt loss, fever, chills  HEENT: changes in vision, sore throat, rhinorrhea  Cardiac: chest pain, palpitations  Pul: SOB, dyspnea, wheezing, cough  GI: abdominal pain, nausea, vomiting, diarrhea, constipation   : dysuria, freq, urgency  MSK: joint pain, swelling, myalgia, back pain  Neuro: weakness, parasthesias, headache  Psych: anxiety, depression  Skin: rashes or suspicious skin lesions    Past Medical History:  Past Medical History:   Diagnosis Date    Asthma     Diabetes ([facility] [address]     Hypertension        Past Surgical History:  No past surgical history on file. Family History:  Family History   Problem Relation Age of Onset    Diabetes Father     Diabetes Sister        Allergies:   Allergies   Allergen Reactions    Codeine Nausea and Vomiting    Tramadol Nausea and Vomiting       Social History:  Social History     Tobacco Use    Smoking status: Never Smoker    Smokeless tobacco: Never Used   Substance Use Topics    Alcohol use: No    Drug use: No       Current Meds:  Current Outpatient Medications on File Prior to Visit   Medication Sig Dispense Refill    cloNIDine HCl (CATAPRES) 0.2 mg tablet Take 1 Tab by mouth two (2) times a day. 180 Tab 0    metoprolol tartrate (LOPRESSOR) 25 mg tablet Take 1 Tab by mouth two (2) times a day. 180 Tab 3    metFORMIN (GLUCOPHAGE) 1,000 mg tablet Take 1 Tab by mouth two (2) times daily (with meals). 180 Tab 3    amLODIPine (NORVASC) 10 mg tablet Take 1 Tab by mouth daily. 90 Tab 3    simvastatin (ZOCOR) 40 mg tablet Take 1 Tab by mouth nightly. Indications: excessive fat in the blood 90 Tab 3    losartan (COZAAR) 100 mg tablet Take 1 Tab by mouth daily. 90 Tab 3    fluticasone propion-salmeterol (AIRDUO RESPICLICK) 98-56 mcg/actuation aepb Take 1 Inhalation by inhalation two (2) times a day. 1 Each 2    glimepiride (AMARYL) 2 mg tablet Take 1 Tab by mouth every morning. 30 Tab 2    albuterol (PROVENTIL HFA, VENTOLIN HFA, PROAIR HFA) 90 mcg/actuation inhaler Take 2 Puffs by inhalation every four (4) hours as needed for Wheezing. 1 Inhaler 3    chlorthalidone (HYGROTEN) 25 mg tablet Take 1 Tab by mouth daily. 90 Tab 3    [DISCONTINUED] raNITIdine (ZANTAC) 300 mg tab Take 1 Tab by mouth daily. 90 Tab 1    [DISCONTINUED] montelukast (SINGULAIR) 10 mg tablet Take 1 Tab by mouth daily. For asthma prevention 90 Tab 3    glucose blood VI test strips (ONETOUCH ULTRA TEST) strip Test 4 times a day. 100 Strip 2    Blood-Glucose Meter monitoring kit Use prn for glucose checks at variety of times, fasting, 2hr pp, bedtime Diagnosis code: E 11.65 1 Kit 1     No current facility-administered medications on file prior to visit. Physical Exam:  Visit Vitals  /58   Pulse 71   Temp 98.1 °F (36.7 °C) (Oral)   Resp 16   Ht 5' 3\" (1.6 m)   Wt 228 lb (103.4 kg)   LMP 09/02/2007   SpO2 100%   BMI 40.39 kg/m²       Gen:  Well developed, well nourished female in no acute distress  HEENT: normocephalic/atraumatic; PERRL; TM intact, translucent BL, some bulging on L TM;  oropharynx shows erythema, no exudates. No visible nasal discharge.  Frontal and maxillary sinuses tender to palpation. Neck:   Supple, no lympadenopathy  Card:  RRR, no murmurs  Chest:  End expiratory ronchi bilaterally, inspiration clear  Abd:  BS+, soft  Extr:  2+ pulses BL  MSK:  no joint pain or swelling  Neuro: CN II-XII grossly intact  Psych:  normal mood and affect   Skin:  No rashes or suspicious skin lesions noted    Assessment/Plan:    1. Moderate persistent asthma without complication: given her rhonchi, likely from her illness. O2 sat is 100%, does not appear to be working to breathe at rest.  - montelukast (SINGULAIR) 10 mg tablet; Take 1 Tab by mouth daily. For asthma prevention  Dispense: 90 Tab; Refill: 3- refill given that she has run out and it will help control her asthma.  - guaiFENesin ER (MUCINEX) 600 mg ER tablet; Take 1 Tab by mouth two (2) times a day for 14 days. Dispense: 28 Tab; Refill: 0- to help with getting the mucous out of her lungs. - predniSONE (STERAPRED DS) 10 mg dose pack; Take 4 tablets the first day, 3 tablets the second day, 2 tablets the third day, 1 tablet the fourth day. Dispense: 10 Tab; Refill: 0- short course of one day of 40mg, 30mg, 20mg, and 10mg given her DM2 and prolonged steroids do not improve outcomes. 2. Acute non-recurrent frontal sinusitis: given facial tenderness and symptoms for one week, will treat as bacterial sinusitis. No subjective fevers, no fever here today. - amoxicillin-clavulanate (AUGMENTIN) 875-125 mg per tablet; Take 1 Tab by mouth every twelve (12) hours for 5 days. Dispense: 10 Tab; Refill: 0    3. Gastroesophageal reflux disease without esophagitis: she is requesting alternative of ranitidine.  - famotidine (PEPCID) 40 mg tablet; Take 1 Tab by mouth daily for 90 days. Dispense: 90 Tab; Refill: 3      I have discussed the diagnosis with the patient and the intended plan as seen in the above orders. The patient has received an after-visit summary and questions were answered concerning future plans.   I have discussed medication side effects and warnings with the patient as well. The patient agrees and understands above plan. Follow-up and Dispositions    · Return if symptoms worsen or fail to improve. Patient discussed with supervising attending.     Alexia Dsouza DO  Primary Care/Sports Medicine Fellow

## 2020-02-14 NOTE — PROGRESS NOTES
Chief Complaint   Patient presents with    Asthma    Cough     1. Have you been to the ER, urgent care clinic since your last visit? Hospitalized since your last visit? No    2. Have you seen or consulted any other health care providers outside of the [address] [place] [address] since your last visit? Include any pap smears or colon screening.  No

## 2020-02-20 DIAGNOSIS — I10 ESSENTIAL HYPERTENSION: ICD-10-CM

## 2020-02-20 RX ORDER — AMLODIPINE BESYLATE 10 MG/1
10 TABLET ORAL DAILY
Qty: 90 TAB | Refills: 1 | Status: SHIPPED | OUTPATIENT
Start: 2020-02-20 | End: 2020-08-03 | Stop reason: SDUPTHER

## 2020-02-24 DIAGNOSIS — I10 HYPERTENSION GOAL BP (BLOOD PRESSURE) < 140/90: ICD-10-CM

## 2020-02-24 RX ORDER — CLONIDINE HYDROCHLORIDE 0.2 MG/1
0.2 TABLET ORAL 2 TIMES DAILY
Qty: 180 TAB | Refills: 0 | Status: SHIPPED | OUTPATIENT
Start: 2020-02-24 | End: 2020-05-28 | Stop reason: SDUPTHER

## 2020-05-28 DIAGNOSIS — I10 HYPERTENSION GOAL BP (BLOOD PRESSURE) < 140/90: ICD-10-CM

## 2020-05-28 RX ORDER — CLONIDINE HYDROCHLORIDE 0.2 MG/1
0.2 TABLET ORAL 2 TIMES DAILY
Qty: 60 TAB | Refills: 0 | Status: SHIPPED | OUTPATIENT
Start: 2020-05-28 | End: 2020-07-15 | Stop reason: SDUPTHER

## 2020-05-28 RX ORDER — CHLORTHALIDONE 25 MG/1
25 TABLET ORAL DAILY
Qty: 90 TAB | Refills: 0 | Status: SHIPPED | OUTPATIENT
Start: 2020-05-28 | End: 2020-08-03 | Stop reason: SDUPTHER

## 2020-05-28 NOTE — TELEPHONE ENCOUNTER
----- Message from Lidia Bruce sent at 5/28/2020  1:49 PM EDT -----  Regarding: Dr. Sun Finders: [facility] with [facility] is following up on refills request for Clonidine and \"Chlorthalidone\". The requests were initially sent on May 20 th and again on the 23 rd. Please follow up on this.

## 2020-06-23 DIAGNOSIS — I10 HYPERTENSION GOAL BP (BLOOD PRESSURE) < 140/90: ICD-10-CM

## 2020-06-24 RX ORDER — CLONIDINE HYDROCHLORIDE 0.2 MG/1
0.2 TABLET ORAL 2 TIMES DAILY
Qty: 60 TAB | Refills: 0 | OUTPATIENT
Start: 2020-06-24

## 2020-07-01 ENCOUNTER — HOSPITAL ENCOUNTER (OUTPATIENT)
Dept: MAMMOGRAPHY | Age: 65
Discharge: HOME | End: 2020-07-01
Attending: STUDENT IN AN ORGANIZED HEALTH CARE EDUCATION/TRAINING PROGRAM
Payer: MEDICARE

## 2020-07-01 DIAGNOSIS — Z12.31 SCREENING MAMMOGRAM, ENCOUNTER FOR: ICD-10-CM

## 2020-07-01 PROCEDURE — 77067 SCR MAMMO BI INCL CAD: CPT

## 2020-07-08 ENCOUNTER — TELEPHONE (OUTPATIENT)
Dept: FAMILY MEDICINE CLINIC | Age: 65
End: 2020-07-08

## 2020-07-13 DIAGNOSIS — E11.9 DIABETES MELLITUS WITHOUT COMPLICATION (HCC): ICD-10-CM

## 2020-07-14 RX ORDER — GLIMEPIRIDE 2 MG/1
2 TABLET ORAL
Qty: 30 TAB | Refills: 0 | Status: SHIPPED | OUTPATIENT
Start: 2020-07-14 | End: 2020-08-03 | Stop reason: SDUPTHER

## 2020-07-15 DIAGNOSIS — I10 HYPERTENSION GOAL BP (BLOOD PRESSURE) < 140/90: ICD-10-CM

## 2020-07-15 RX ORDER — CLONIDINE HYDROCHLORIDE 0.2 MG/1
0.2 TABLET ORAL 2 TIMES DAILY
Qty: 60 TAB | Refills: 0 | Status: SHIPPED | OUTPATIENT
Start: 2020-07-15 | End: 2020-08-03 | Stop reason: SDUPTHER

## 2020-08-03 ENCOUNTER — VIRTUAL VISIT (OUTPATIENT)
Dept: FAMILY MEDICINE CLINIC | Age: 65
End: 2020-08-03
Payer: MEDICARE

## 2020-08-03 DIAGNOSIS — I10 ESSENTIAL HYPERTENSION: ICD-10-CM

## 2020-08-03 DIAGNOSIS — E11.9 DIABETES MELLITUS WITHOUT COMPLICATION (HCC): Primary | ICD-10-CM

## 2020-08-03 DIAGNOSIS — I87.8 VENOUS STASIS OF BOTH LOWER EXTREMITIES: ICD-10-CM

## 2020-08-03 DIAGNOSIS — I10 HYPERTENSION GOAL BP (BLOOD PRESSURE) < 140/90: ICD-10-CM

## 2020-08-03 PROCEDURE — 99443 PR PHYS/QHP TELEPHONE EVALUATION 21-30 MIN: CPT | Performed by: STUDENT IN AN ORGANIZED HEALTH CARE EDUCATION/TRAINING PROGRAM

## 2020-08-03 RX ORDER — SIMVASTATIN 40 MG/1
40 TABLET, FILM COATED ORAL
Qty: 90 TAB | Refills: 1 | Status: SHIPPED | OUTPATIENT
Start: 2020-08-03 | End: 2020-11-29

## 2020-08-03 RX ORDER — LOSARTAN POTASSIUM 100 MG/1
100 TABLET ORAL DAILY
Qty: 90 TAB | Refills: 1 | Status: SHIPPED | OUTPATIENT
Start: 2020-08-03 | End: 2021-06-13

## 2020-08-03 RX ORDER — METFORMIN HYDROCHLORIDE 1000 MG/1
1000 TABLET ORAL 2 TIMES DAILY WITH MEALS
Qty: 180 TAB | Refills: 0 | Status: SHIPPED | OUTPATIENT
Start: 2020-08-03 | End: 2020-11-01

## 2020-08-03 RX ORDER — GLIMEPIRIDE 2 MG/1
2 TABLET ORAL
Qty: 90 TAB | Refills: 1 | Status: SHIPPED | OUTPATIENT
Start: 2020-08-03 | End: 2020-08-14 | Stop reason: DRUGHIGH

## 2020-08-03 RX ORDER — METOPROLOL TARTRATE 25 MG/1
25 TABLET, FILM COATED ORAL 2 TIMES DAILY
Qty: 180 TAB | Refills: 0 | Status: SHIPPED | OUTPATIENT
Start: 2020-08-03 | End: 2020-11-01

## 2020-08-03 RX ORDER — CHLORTHALIDONE 25 MG/1
25 TABLET ORAL DAILY
Qty: 90 TAB | Refills: 1 | Status: SHIPPED | OUTPATIENT
Start: 2020-08-03 | End: 2021-03-09

## 2020-08-03 RX ORDER — CLONIDINE HYDROCHLORIDE 0.2 MG/1
0.2 TABLET ORAL 2 TIMES DAILY
Qty: 60 TAB | Refills: 0 | Status: SHIPPED | OUTPATIENT
Start: 2020-08-03 | End: 2020-10-01

## 2020-08-03 RX ORDER — AMLODIPINE BESYLATE 10 MG/1
10 TABLET ORAL DAILY
Qty: 90 TAB | Refills: 1 | Status: SHIPPED | OUTPATIENT
Start: 2020-08-03 | End: 2021-03-09

## 2020-08-03 NOTE — Clinical Note
Hello,     This patient needs to be seen in clinic in the next 3 weeks for a Medicare wellness visit and follow up on her bilateral lower extremity edema. She can be seen by me or another provider. Thank you!

## 2020-08-03 NOTE — PROGRESS NOTES
Rosi Johnson  72 y.o. female  1955  [facility] [address]  [address]    [phone] (home)      [address]:    [facility] Encounter  [facility]       Encounter Date: 8/3/2020 at 8:01 AM    Consent: Rosi Johnson, who was seen by synchronous (real-time) audio only technology, and/or her healthcare decision maker, is aware that this patient-initiated, Telehealth encounter on 8/3/2020 is a billable service, with coverage as determined by her insurance carrier. She is aware that she may receive a bill and has provided verbal consent to proceed: Yes. Chief Complaint   Patient presents with    Medication Refill    Peripheral Edema       History of Present Illness   Rosi Johnson is a 72 y.o. female was evaluated by telephone. I communicated with the patient and/or health care decision maker about     Medication Refill   Pt w/ h/o DM, HTN and HLD requesting medication refills for all meds. Pt has a BP cuff at home but doesn't check her pressure regularly. Denies HA, vision changes, dizziness, lightheadedness, chest pain or SOB. Pt see's Dr. Maria Elena Agosto at [facility] for her diabetic eye exam. She denies any changes in her vision at home. She does not check her blood sugar at home. Edema  Pt reports intermittent bilateral lower leg swelling to the ankles for 3 months. States this is a new problem. The swelling is worse at the end of the day and improved in the AM.  There is no pain associated with the swelling. She denies any numbness, tingling or weakness in her feet or legs. Denies any problems with ambulation and denies any falls. No skin changes or break down and denies any ulcers. Denies chest pain or SOB. Does not own any compression stockings and only props her feet up at night. COVID screening: NEGATIVE. Pt denies sick contacts or recent travel.  Denies recent illness, fever, chills, sore throat, vision changes, HA, dizziness, cough , SOB, chest pain, dysuria, n/v/d, abdominal pain or extremity edema. Review of Systems   Review of Systems   Constitutional: Negative for chills, fever, malaise/fatigue and weight loss. HENT: Negative for congestion and sinus pain. Eyes: Negative for double vision. Respiratory: Negative for cough and shortness of breath. Cardiovascular: Positive for leg swelling. Negative for chest pain and palpitations. Gastrointestinal: Negative for abdominal pain, blood in stool, constipation, nausea and vomiting. Genitourinary: Negative for dysuria and hematuria. Musculoskeletal: Negative for myalgias. Skin: Negative for rash. Neurological: Negative for dizziness and headaches. Endo/Heme/Allergies: Does not bruise/bleed easily. Psychiatric/Behavioral: Negative for depression. The patient is not nervous/anxious and does not have insomnia. Vitals/Objective:   General: Patient speaking in complete sentences without effort. Normal speech and cooperative. Due to this being a Virtual Check-in/Telephone evaluation, many elements of the physical examination are unable to be assessed. Assessment and Plan:   Time-based coding, delete if not needed: I spent at least 25 minutes with this established patient, and >50% of the time was spent counseling and/or coordinating care regarding Med refill  Total Time: minutes: 21-30 minutes      1. Diabetes mellitus without complication ([facility] [place] [address]  Does not check BS at home. A1c 2/2019 was 8.6.    -discussed diet modification   -labs (A1c, CMP & Lipid), Pt will come in next week to have them drawn   -refilled all meds   -Advised to schedule her annual DM eye exam with her optometrist   -referral sent to podiatry for DM foot exam as Pt has never had one before. 2. Essential hypertension  Does not check BP at home although has a home cuff. Asymptomatic.    -refilled all meds.   -advised checking BP every other day and keeping a log. If consistently elevated readings of >140/90 then she needs to come in and have her BP checked and medication adjusted     3. Venous stasis of both lower extremities   Intermittent BLE edema to ankles. Improved in AM, worse at end of the day. Pt denies skin break down, color changes, pain, ulcers or decreased sensation of feet. Asymptomatic otherwise. Likely venous stasis 2/2 to DM & HTN.   -recommended compression stockings   -elevating feet at night   -will have Pt follow up on this at her MAW visit this month. -red flag symptoms reviewed with Pt     We discussed the expected course, resolution and complications of the diagnosis(es) in detail. Medication risks, benefits, costs, interactions, and alternatives were discussed as indicated. I advised her to contact the office if her condition worsens, changes or fails to improve as anticipated. She expressed understanding with the diagnosis(es) and plan. Patient understands that this encounter was a temporary measure, and the importance of further follow up and examination was emphasized. Patient verbalized understanding. Patient informed to follow up: 2-3 weeks for MAW & f/u BLE edema. Follow-up and Dispositions  ·   Return in about 3 weeks (around 8/24/2020) for MAW in office & follow up on BLE edema . I affirm this is a Patient Initiated Episode with an Established Patient who has not had a related appointment within my department in the past 7 days or scheduled within the next 24 hours. Note: not billable if this call serves to triage the patient into an appointment for the relevant concern      Electronically Signed: Radha De Luna DO  Providers location when delivering service: HOME          ICD-10-CM ICD-9-CM    1.  Diabetes mellitus without complication (HCC)  H60.6 250.00 HEMOGLOBIN A1C WITH EAG      METABOLIC PANEL, COMPREHENSIVE      LIPID PANEL      HM DIABETES FOOT EXAM      REFERRAL TO PODIATRY      simvastatin (ZOCOR) 40 mg tablet      metFORMIN (GLUCOPHAGE) 1,000 mg tablet      glimepiride (AMARYL) 2 mg tablet   2. Essential hypertension  I10 401.9 metoprolol tartrate (LOPRESSOR) 25 mg tablet      losartan (COZAAR) 100 mg tablet      amLODIPine (NORVASC) 10 mg tablet    not optimal, avoid diuretics due to gout   3. Hypertension goal BP (blood pressure) < 140/90  I10 401.9 cloNIDine HCL (CATAPRES) 0.2 mg tablet      chlorthalidone (HYGROTEN) 25 mg tablet   4. Venous stasis of both lower extremities  I87.8 459.81        Pursuant to the emergency declaration under the [facility] waiver authority and the [employer] Act, this Virtual  Visit was conducted, with patient's consent, to reduce the patient's risk of exposure to COVID-19 and provide continuity of care for an established patient. History   Patients past medical, surgical and family histories were personally reviewed and updated.       Past Medical History:   Diagnosis Date    Asthma     Diabetes ([facility] [address]     Hypertension      Past Surgical History:   Procedure Laterality Date    HX TOTAL ABDOMINAL HYSTERECTOMY  2007     Family History   Problem Relation Age of Onset    Diabetes Father     Diabetes Sister      Social History     Socioeconomic History    Marital status: SINGLE     Spouse name: Not on file    Number of children: Not on file    Years of education: Not on file    Highest education level: Not on file   Occupational History    Not on file   Social Needs    Financial resource strain: Not on file    Food insecurity     Worry: Not on file     Inability: Not on file    Transportation needs     Medical: Not on file     Non-medical: Not on file   Tobacco Use    Smoking status: Never Smoker    Smokeless tobacco: Never Used   Substance and Sexual Activity    Alcohol use: No    Drug use: No    Sexual activity: Never   Lifestyle    Physical activity     Days per week: Not on file     Minutes per session: Not on file    Stress: Not on file   Relationships    Social connections     Talks on phone: Not on file     Gets together: Not on file     Attends Mosque service: Not on file     Active member of club or organization: Not on file     Attends meetings of clubs or organizations: Not on file     Relationship status: Not on file    Intimate partner violence     Fear of current or ex partner: Not on file     Emotionally abused: Not on file     Physically abused: Not on file     Forced sexual activity: Not on file   Other Topics Concern    Not on file   Social History Narrative    Not on file            Current Medications/Allergies   Medications and Allergies reviewed:    Current Outpatient Medications   Medication Sig Dispense Refill    simvastatin (ZOCOR) 40 mg tablet Take 1 Tab by mouth nightly for 90 days. Indications: excessive fat in the blood 90 Tab 1    metoprolol tartrate (LOPRESSOR) 25 mg tablet Take 1 Tab by mouth two (2) times a day for 90 days. 180 Tab 0    metFORMIN (GLUCOPHAGE) 1,000 mg tablet Take 1 Tab by mouth two (2) times daily (with meals) for 90 days. 180 Tab 0    losartan (COZAAR) 100 mg tablet Take 1 Tab by mouth daily. 90 Tab 1    glimepiride (AMARYL) 2 mg tablet Take 1 Tab by mouth every morning for 90 days. 90 Tab 1    cloNIDine HCL (CATAPRES) 0.2 mg tablet Take 1 Tab by mouth two (2) times a day. 60 Tab 0    chlorthalidone (HYGROTEN) 25 mg tablet Take 1 Tab by mouth daily. 90 Tab 1    amLODIPine (NORVASC) 10 mg tablet Take 1 Tab by mouth daily. 90 Tab 1    montelukast (SINGULAIR) 10 mg tablet Take 1 Tab by mouth daily. For asthma prevention 90 Tab 3    fluticasone propion-salmeterol (AIRDUO RESPICLICK) 19-25 mcg/actuation aepb Take 1 Inhalation by inhalation two (2) times a day.  1 Each 2    albuterol (PROVENTIL HFA, VENTOLIN HFA, PROAIR HFA) 90 mcg/actuation inhaler Take 2 Puffs by inhalation every four (4) hours as needed for Wheezing. 1 Inhaler 3    glucose blood VI test strips (ONETOUCH ULTRA TEST) strip Test 4 times a day.  100 Strip 2    Blood-Glucose Meter monitoring kit Use prn for glucose checks at variety of times, fasting, 2hr pp, bedtime Diagnosis code: E 11.65 1 Kit 1     Allergies   Allergen Reactions    Codeine Nausea and Vomiting    Tramadol Nausea and Vomiting

## 2020-08-03 NOTE — Clinical Note
Pt needs follow up in clinic in 2-3 weeks for medicare wellness visit and follow up on new lower extremity edema. Thanks you.

## 2020-08-12 ENCOUNTER — APPOINTMENT (OUTPATIENT)
Dept: FAMILY MEDICINE CLINIC | Age: 65
End: 2020-08-12

## 2020-08-12 ENCOUNTER — HOSPITAL ENCOUNTER (OUTPATIENT)
Dept: LAB | Age: 65
Discharge: HOME | End: 2020-08-12

## 2020-08-12 DIAGNOSIS — E11.9 DIABETES MELLITUS WITHOUT COMPLICATION (HCC): ICD-10-CM

## 2020-08-12 LAB
ALBUMIN SERPL-MCNC: 3.6 G/DL (ref 3.5–5)
ALBUMIN/GLOB SERPL: 0.9 {RATIO} (ref 1.1–2.2)
ALP SERPL-CCNC: 89 U/L (ref 45–117)
ALT SERPL-CCNC: 12 U/L (ref 12–78)
ANION GAP SERPL CALC-SCNC: 6 MMOL/L (ref 5–15)
AST SERPL-CCNC: 7 U/L (ref 15–37)
BILIRUB SERPL-MCNC: 0.4 MG/DL (ref 0.2–1)
BUN SERPL-MCNC: 21 MG/DL (ref 6–20)
BUN/CREAT SERPL: 25 (ref 12–20)
CALCIUM SERPL-MCNC: 9.8 MG/DL (ref 8.5–10.1)
CHLORIDE SERPL-SCNC: 102 MMOL/L (ref 97–108)
CHOLEST SERPL-MCNC: 164 MG/DL
CO2 SERPL-SCNC: 29 MMOL/L (ref 21–32)
CREAT SERPL-MCNC: 0.83 MG/DL (ref 0.55–1.02)
EST. AVERAGE GLUCOSE BLD GHB EST-MCNC: 214 MG/DL
GLOBULIN SER CALC-MCNC: 3.8 G/DL (ref 2–4)
GLUCOSE SERPL-MCNC: 176 MG/DL (ref 65–100)
HBA1C MFR BLD: 9.1 % (ref 4–5.6)
HDLC SERPL-MCNC: 93 MG/DL
HDLC SERPL: 1.8 {RATIO} (ref 0–5)
LDLC SERPL CALC-MCNC: 61 MG/DL (ref 0–100)
LIPID PROFILE,FLP: NORMAL
POTASSIUM SERPL-SCNC: 4 MMOL/L (ref 3.5–5.1)
PROT SERPL-MCNC: 7.4 G/DL (ref 6.4–8.2)
SODIUM SERPL-SCNC: 137 MMOL/L (ref 136–145)
TRIGL SERPL-MCNC: 50 MG/DL (ref ?–150)
VLDLC SERPL CALC-MCNC: 10 MG/DL

## 2020-08-14 ENCOUNTER — TELEPHONE (OUTPATIENT)
Dept: FAMILY MEDICINE CLINIC | Age: 65
End: 2020-08-14

## 2020-08-14 DIAGNOSIS — E11.9 DIABETES MELLITUS WITHOUT COMPLICATION (HCC): Primary | ICD-10-CM

## 2020-08-14 RX ORDER — GLIMEPIRIDE 4 MG/1
4 TABLET ORAL
Qty: 90 TAB | Refills: 1 | Status: SHIPPED | OUTPATIENT
Start: 2020-08-14 | End: 2020-11-29

## 2020-08-14 NOTE — PROGRESS NOTES
-A1c up from last year 8.6 --> 9.1. Cr wnl and remainder of CMp wnl   -Lipid panel wnl     I personally reviewed labs and labs discussed with patient. all questions were answered. See telephone encounter for details.

## 2020-09-12 DIAGNOSIS — J45.40 MODERATE PERSISTENT ASTHMA WITHOUT COMPLICATION: ICD-10-CM

## 2020-09-14 RX ORDER — ALBUTEROL SULFATE 90 UG/1
2 AEROSOL, METERED RESPIRATORY (INHALATION)
Qty: 1 INHALER | Refills: 3 | Status: SHIPPED | OUTPATIENT
Start: 2020-09-14 | End: 2021-07-26

## 2020-09-16 NOTE — PROGRESS NOTES
[address] Medicine Residency Attending Addendum:  Dr. Wilfred Inman, DO,  the patient and I were not physically present during this encounter. The resident and I are concurrently monitoring the patient care through appropriate telecommunication technology. I discussed the findings, assessment and plan with the resident and agree with the resident's findings and plan as documented in the resident's note.       Ami Greene MD

## 2020-12-15 ENCOUNTER — LAB ONLY (OUTPATIENT)
Dept: FAMILY MEDICINE CLINIC | Age: 65
End: 2020-12-15

## 2020-12-15 DIAGNOSIS — E11.9 DIABETES MELLITUS WITHOUT COMPLICATION (HCC): ICD-10-CM

## 2020-12-15 LAB
EST. AVERAGE GLUCOSE BLD GHB EST-MCNC: 183 MG/DL
HBA1C MFR BLD: 8 % (ref 4–5.6)

## 2020-12-18 NOTE — PROGRESS NOTES
-Pt A1c improved from 9.1 4 months ago but is still not at goal.  Goal for her given her age and other comorbidities and life expectancy >10 years would be 7.0-7.5%.    -I increased her glimeperide from 2mg to 4mg 3 months ago which helped.     -Need to talk with Pt and confirm she is still taking Glimeperide 4mg daily and Metformin 1000mg BID   -If so then may want to consider adding a 3rd medication such as Januvia (DPP4) or Jardiance     Will call Pt to discuss

## 2021-01-25 ENCOUNTER — VIRTUAL VISIT (OUTPATIENT)
Dept: FAMILY MEDICINE CLINIC | Age: 66
End: 2021-01-25
Payer: MEDICARE

## 2021-01-25 DIAGNOSIS — E11.9 DIABETES MELLITUS WITHOUT COMPLICATION (HCC): Primary | ICD-10-CM

## 2021-01-25 DIAGNOSIS — I10 ESSENTIAL HYPERTENSION: ICD-10-CM

## 2021-01-25 PROCEDURE — 99443 PR PHYS/QHP TELEPHONE EVALUATION 21-30 MIN: CPT | Performed by: STUDENT IN AN ORGANIZED HEALTH CARE EDUCATION/TRAINING PROGRAM

## 2021-01-25 RX ORDER — METOPROLOL TARTRATE 25 MG/1
TABLET, FILM COATED ORAL
COMMUNITY
Start: 2020-12-10 | End: 2021-03-09

## 2021-01-25 RX ORDER — GLIMEPIRIDE 4 MG/1
8 TABLET ORAL
Qty: 180 TAB | Refills: 1 | Status: SHIPPED | OUTPATIENT
Start: 2021-01-25 | End: 2021-07-28

## 2021-01-25 RX ORDER — METFORMIN HYDROCHLORIDE 1000 MG/1
TABLET ORAL
COMMUNITY
Start: 2020-12-10 | End: 2021-03-09

## 2021-01-25 RX ORDER — FAMOTIDINE 40 MG/1
TABLET, FILM COATED ORAL
COMMUNITY
Start: 2021-01-22 | End: 2021-04-28 | Stop reason: SDUPTHER

## 2021-01-25 RX ORDER — CLONIDINE HYDROCHLORIDE 0.2 MG/1
TABLET ORAL
COMMUNITY
Start: 2021-01-22 | End: 2021-09-13

## 2021-01-25 NOTE — PROGRESS NOTES
Liza Feliciano  72 y.o. female  1955  [facility] [address]  [id number]    [phone] (home)      [address]:    Telephone Encounter  [place]       Encounter Date: 1/25/2021 at 3:57 PM    Consent: Liza Feliciano, who was seen by synchronous (real-time) audio only technology, and/or her healthcare decision maker, is aware that this patient-initiated, Telehealth encounter on 1/25/2021 is a billable service, with coverage as determined by her insurance carrier. She is aware that she may receive a bill and has provided verbal consent to proceed: Yes. Chief Complaint   Patient presents with    Follow-up    Diabetes    Hypertension       History of Present Illness   Liza Feliciano is a 72 y.o. female was evaluated by telephone. I communicated with the patient and/or health care decision maker about     Follow up DM  Labs drawn 12/15 showed improvement in A1c from 9.1 to 8.0.   -Gilmeperide had been increased from 2mg to 4mg daily prior to this lab and Pt states she continues to be compliant. Also compliant with Metformin 1000 BID. - not been checking her blood sugar for a while bc her glucose meter broke.    -walks about 2-3 miles with the ladies from her [place]. -Diet: she has cut back on salt but states she eats a lot of fried food.   -Pt is supposed to have her DM eye exam on Friday 1/29  -Denies numbness, tingling in her hands or feet. Denies polyuria, ro polydipsia, vision changes, n/v/d, abdominal pain. HTN follow up   -Pt is compliant with her BP meds. -She last checked her pressure 2 weeks ago. It was 696 systolic. She cannot remember the lower number.  -denies SOB, chest pain, palpitations, HA, vision changes. COVID screening: NEGATIVE. Pt denies sick contacts or recent travel.  Denies recent illness, fever, chills, sore throat, vision changes, HA, dizziness, cough , SOB, chest pain, dysuria, n/v/d, abdominal pain or extremity edema. Review of Systems   Review of Systems   Constitutional: Negative for chills and fever. HENT: Negative for congestion and sinus pain. Eyes: Negative for double vision. Respiratory: Negative for cough and shortness of breath. Cardiovascular: Negative for chest pain and palpitations. Gastrointestinal: Negative for abdominal pain, nausea and vomiting. Genitourinary: Negative for dysuria and hematuria. Musculoskeletal: Negative for myalgias. Skin: Negative for rash. Neurological: Negative for dizziness and headaches. Vitals/Objective:   General: Patient speaking in complete sentences without effort. Normal speech and cooperative. Due to this being a Virtual Check-in/Telephone evaluation, many elements of the physical examination are unable to be assessed. Assessment and Plan: Total Time: minutes: 25 minutes    1. Diabetes mellitus without complication ([facility]  G2L 8 in December. Maxed on Glimepiride at 4mg daily and Metformin at 1000mg BID. Is not checking sugars at home d/t broken glucose meter. Denies symptoms of hypo or hyperglycemia   -get new sugar meter and start checking blood sugar   - continue metformin 1000mg BID   -INCREASE Glimepiride to 8 mg daily. -recheck labs in 2 months   -if still not at goal consider adding Jardiance (SGLT2)   - HEMOGLOBIN A1C WITH EAG; Future  - MICROALBUMIN, UR, RAND W/ MICROALB/CREAT RATIO; Future    2. Essential hypertension  -continue current meds   -goal BP <140/90   -advised to keep checking home BP and inform if readings are consistently higher     Patient informed to follow up: labs in 2 months     I affirm this is a Patient Initiated Episode with an Established Patient who has not had a related appointment within my department in the past 7 days or scheduled within the next 24 hours.   Note: not billable if this call serves to triage the patient into an appointment for the relevant concern      Electronically Signed: Waldemar Primrose, DO  Providers location when delivering service: home       ICD-10-CM ICD-9-CM    1. Diabetes mellitus without complication (HCC)  H57.3 250.00 HEMOGLOBIN A1C WITH EAG      MICROALBUMIN, UR, RAND W/ MICROALB/CREAT RATIO      glimepiride (AMARYL) 4 mg tablet         2. Essential hypertension  I10 401.9        Pursuant to the emergency declaration under the [facility] [address] [facility] authority and the [employer] Act, this Virtual  Visit was conducted, with patient's consent, to reduce the patient's risk of exposure to COVID-19 and provide continuity of care for an established patient. History   Patients past medical, surgical and family histories were personally reviewed and updated. Past Medical History:   Diagnosis Date    Asthma     Diabetes ([address]     Hypertension      Past Surgical History:   Procedure Laterality Date    HX TOTAL ABDOMINAL HYSTERECTOMY  2007     Family History   Problem Relation Age of Onset    Diabetes Father     Diabetes Sister      Social History     Tobacco Use    Smoking status: Never Smoker    Smokeless tobacco: Never Used   Substance Use Topics    Alcohol use: No    Drug use: No              Current Medications/Allergies   Medications and Allergies reviewed:    Current Outpatient Medications   Medication Sig Dispense Refill    metFORMIN (GLUCOPHAGE) 1,000 mg tablet TAKE ONE TABLET BY MOUTH TWICE A DAY WITH MEALS      metoprolol tartrate (LOPRESSOR) 25 mg tablet TAKE ONE TABLET BY MOUTH TWICE A DAY      famotidine (PEPCID) 40 mg tablet TAKE ONE TABLET BY MOUTH ONE TIME DAILY      cloNIDine HCL (CATAPRES) 0.2 mg tablet TAKE ONE TABLET BY MOUTH TWICE A DAY      glimepiride (AMARYL) 4 mg tablet Take 2 Tabs by mouth every morning for 90 days.  180 Tab 1    simvastatin (ZOCOR) 40 mg tablet TAKE ONE TABLET BY MOUTH ONE TIME DAILY IN THE EVENING 90 Tab 1    albuterol (PROVENTIL HFA, VENTOLIN HFA, PROAIR HFA) 90 mcg/actuation inhaler Take 2 Puffs by inhalation every four (4) hours as needed for Wheezing. 1 Inhaler 3    glucose blood VI test strips (OneTouch Ultra Test) strip Test 4 times a day. 100 Strip 2    losartan (COZAAR) 100 mg tablet Take 1 Tab by mouth daily. 90 Tab 1    chlorthalidone (HYGROTEN) 25 mg tablet Take 1 Tab by mouth daily. 90 Tab 1    amLODIPine (NORVASC) 10 mg tablet Take 1 Tab by mouth daily. 90 Tab 1    montelukast (SINGULAIR) 10 mg tablet Take 1 Tab by mouth daily. For asthma prevention 90 Tab 3    fluticasone propion-salmeterol (AIRDUO RESPICLICK) 99-43 mcg/actuation aepb Take 1 Inhalation by inhalation two (2) times a day.  1 Each 2    Blood-Glucose Meter monitoring kit Use prn for glucose checks at variety of times, fasting, 2hr pp, bedtime Diagnosis code: E 11.65 1 Kit 1     Allergies   Allergen Reactions    Codeine Nausea and Vomiting    Tramadol Nausea and Vomiting

## 2021-03-23 ENCOUNTER — TELEPHONE (OUTPATIENT)
Dept: FAMILY MEDICINE CLINIC | Age: 66
End: 2021-03-23

## 2021-03-23 NOTE — TELEPHONE ENCOUNTER
----- Message from Lalitha Madrid DO sent at 3/22/2021  2:53 PM EDT -----  Please call Pt to schedule a LAB ONLY visit this week or next week to recheck patients A1c and BMP. Labs were placed in computer 1/2021    Thanks!

## 2021-04-28 DIAGNOSIS — J45.40 MODERATE PERSISTENT ASTHMA WITHOUT COMPLICATION: ICD-10-CM

## 2021-05-03 RX ORDER — MONTELUKAST SODIUM 10 MG/1
10 TABLET ORAL DAILY
Qty: 90 TAB | Refills: 3 | Status: SHIPPED | OUTPATIENT
Start: 2021-05-03 | End: 2022-05-15

## 2021-05-03 RX ORDER — FAMOTIDINE 40 MG/1
40 TABLET, FILM COATED ORAL
Qty: 30 TAB | Refills: 3 | Status: SHIPPED | OUTPATIENT
Start: 2021-05-03 | End: 2021-09-13

## 2021-06-13 DIAGNOSIS — E66.01 OBESITY, MORBID (HCC): ICD-10-CM

## 2021-06-13 DIAGNOSIS — E11.9 DIABETES MELLITUS WITHOUT COMPLICATION (HCC): Primary | ICD-10-CM

## 2021-06-13 DIAGNOSIS — I10 ESSENTIAL HYPERTENSION: ICD-10-CM

## 2021-06-13 RX ORDER — LOSARTAN POTASSIUM 100 MG/1
TABLET ORAL
Qty: 90 TABLET | Refills: 0 | Status: SHIPPED | OUTPATIENT
Start: 2021-06-13 | End: 2021-09-07

## 2021-07-26 DIAGNOSIS — J45.40 MODERATE PERSISTENT ASTHMA WITHOUT COMPLICATION: ICD-10-CM

## 2021-07-26 RX ORDER — ALBUTEROL SULFATE 90 UG/1
AEROSOL, METERED RESPIRATORY (INHALATION)
Qty: 1 INHALER | Refills: 3 | Status: SHIPPED | OUTPATIENT
Start: 2021-07-26 | End: 2022-03-01 | Stop reason: SDUPTHER

## 2021-07-28 DIAGNOSIS — E11.9 DIABETES MELLITUS WITHOUT COMPLICATION (HCC): ICD-10-CM

## 2021-07-28 RX ORDER — GLIMEPIRIDE 4 MG/1
TABLET ORAL
Qty: 180 TABLET | Refills: 1 | Status: SHIPPED | OUTPATIENT
Start: 2021-07-28 | End: 2021-12-20

## 2021-09-11 DIAGNOSIS — I10 HYPERTENSION GOAL BP (BLOOD PRESSURE) < 140/90: ICD-10-CM

## 2021-09-11 DIAGNOSIS — I10 ESSENTIAL HYPERTENSION: ICD-10-CM

## 2021-09-13 RX ORDER — FAMOTIDINE 40 MG/1
TABLET, FILM COATED ORAL
Qty: 30 TABLET | Refills: 1 | Status: SHIPPED | OUTPATIENT
Start: 2021-09-13 | End: 2021-10-13

## 2021-09-13 RX ORDER — AMLODIPINE BESYLATE 10 MG/1
TABLET ORAL
Qty: 30 TABLET | Refills: 1 | Status: SHIPPED | OUTPATIENT
Start: 2021-09-13 | End: 2021-10-13

## 2021-09-13 RX ORDER — CLONIDINE HYDROCHLORIDE 0.2 MG/1
TABLET ORAL
Qty: 60 TABLET | Refills: 1 | Status: SHIPPED | OUTPATIENT
Start: 2021-09-13 | End: 2021-10-13

## 2021-09-13 RX ORDER — CHLORTHALIDONE 25 MG/1
TABLET ORAL
Qty: 30 TABLET | Refills: 1 | Status: SHIPPED | OUTPATIENT
Start: 2021-09-13 | End: 2022-02-18

## 2021-09-22 NOTE — PROGRESS NOTES
Subjective: Harsha Gonzales is a 77 y.o. female who presents for diabetes follow up. PMH significant for HTN, Hyperlipidemia, DM2. Current regimen Metformin 1000mg BID, Glimepiride 4mg BID     Blood sugars at home: not checking at home. Meter is not working   Home BP: 130's/60's checks 3 times per week     DIABETIC CARE CHECKLIST   1. Previous lab work reviewed   - Last A1c: 12/20 (8.0)   - Last lipid panel: 12/20 (LDL 64 and at goal)    - Last urine Microalbumin/Creatinine: 11/2019 at goal    - Last creatinine level: 0.83  2. Patient on ASA  no  3. Patient on Statin- yes   4. Patient on ACEI/ARB- yes  5. Patient attempting to follow diabetic diet  trying too   6. Activity log  walking 3 times per week for 45 minutes   7. Last eye check  3 months ago    8. Last comprehensive foot exam  today   9. Last flu shot: will get in October   10. Last Pneumococcal vaccination: 2016 when she was <65. Needs another today       ROS:  Review of Systems   Constitutional: Negative for chills and fever. Eyes: Negative for blurred vision and double vision. Respiratory: Negative for shortness of breath. Cardiovascular: Negative for chest pain and leg swelling. Gastrointestinal: Negative for abdominal pain, nausea and vomiting. Genitourinary: Negative for dysuria, frequency and urgency. Musculoskeletal: Negative for joint pain. Skin: Negative for rash. Neurological: Negative for dizziness, tingling, sensory change and headaches.          PMHx:  Past Medical History:   Diagnosis Date    Asthma     Diabetes ([facility]     Hypertension        Meds:   Current Outpatient Medications   Medication Sig Dispense Refill    chlorthalidone (HYGROTON) 25 mg tablet TAKE ONE TABLET BY MOUTH ONE TIME DAILY 30 Tablet 1    amLODIPine (NORVASC) 10 mg tablet TAKE ONE TABLET BY MOUTH ONE TIME DAILY 30 Tablet 1    famotidine (PEPCID) 40 mg tablet TAKE ONE TABLET BY MOUTH EVERY MORNING BEFORE BREAKFAST 30 Tablet 1    cloNIDine HCL (CATAPRES) 0.2 mg tablet TAKE ONE TABLET BY MOUTH TWICE A DAY 60 Tablet 1    losartan (COZAAR) 100 mg tablet TAKE ONE TABLET BY MOUTH ONE TIME DAILY 90 Tablet 0    glimepiride (AMARYL) 4 mg tablet TAKE TWO TABLETS BY MOUTH EVERY MORNING 180 Tablet 1    albuterol (PROVENTIL HFA, VENTOLIN HFA, PROAIR HFA) 90 mcg/actuation inhaler INHALE TWO PUFFS BY MOUTH EVERY 4 HOURS AS NEEDED FOR WHEEZING 1 Inhaler 3    montelukast (SINGULAIR) 10 mg tablet Take 1 Tab by mouth daily. For asthma prevention 90 Tab 3    metFORMIN (GLUCOPHAGE) 1,000 mg tablet TAKE ONE TABLET BY MOUTH TWICE A DAY WITH MEALS 180 Tab 1    metoprolol tartrate (LOPRESSOR) 25 mg tablet TAKE ONE TABLET BY MOUTH TWICE A  Tab 1    simvastatin (ZOCOR) 40 mg tablet TAKE ONE TABLET BY MOUTH ONE TIME DAILY IN THE EVENING 90 Tab 1    glucose blood VI test strips (OneTouch Ultra Test) strip Test 4 times a day. 100 Strip 2    fluticasone propion-salmeterol (AIRDUO RESPICLICK) 47-87 mcg/actuation aepb Take 1 Inhalation by inhalation two (2) times a day. 1 Each 2    Blood-Glucose Meter monitoring kit Use prn for glucose checks at variety of times, fasting, 2hr pp, bedtime Diagnosis code: E 11.65 1 Kit 1       Allergies: Allergies   Allergen Reactions    Codeine Nausea and Vomiting    Tramadol Nausea and Vomiting       Smoker:  Social History     Tobacco Use   Smoking Status Never Smoker   Smokeless Tobacco Never Used       ETOH:   Social History     Substance and Sexual Activity   Alcohol Use No       FH:   Family History   Problem Relation Age of Onset    Diabetes Father     Diabetes Sister          Objective:     Visit Vitals  BP (!) 135/59 (BP 1 Location: Left arm, BP Patient Position: Sitting, BP Cuff Size: Adult long)   Pulse 78   Temp 97.9 °F (36.6 °C) (Temporal)   Resp 16   Ht 5' 3\" (1.6 m)   Wt 234 lb (106.1 kg)   LMP 09/02/2007   SpO2 100%   BMI 41.45 kg/m²       Physical Examination:   GEN: No apparent distress.  Alert and oriented and responds to all questions appropriately. NECK:  Supple; no masses; thyroid normal           LUNGS: Respirations unlabored; clear to auscultation bilaterally  CARDIOVASCULAR: Regular, rate, and rhythm without murmurs, gallops or rubs   ABDOMEN: Soft; nontender; nondistended; normoactive bowel sounds; no masses or organomegaly  EXT: Well perfused. No edema. SKIN: No obvious rashes. FOOT EXAM: Diabetic foot exam: wnl    Left: Vibratory sensation normal    Proprioception normal    Sharp/dull discrimination normal    Filament test normal sensation with micro filament    Pulse DP: 2+ (normal)    Pulse PT: 2+ (normal)    Deformities: None      Right: Vibratory sensation normal    Proprioception normal    Sharp/dull discrimination normal    Filament test normal sensation with micro filament    Pulse DP: 2+ (normal)    Pulse PT: 2+ (normal)    Deformities: None    CNS:   No obvious cranial nerve deficit    Peripheral sensations intact in all 4 extremities    Power equal in all 4 extremities    No gross focal deficits      Assessment:       ICD-10-CM ICD-9-CM    1. Type II diabetes mellitus with complication, uncontrolled ([facility])  E11.8 250.92 REFERRAL TO GASTROENTEROLOGY    E11.65  LIPID PANEL      HEMOGLOBIN A1C WITH EAG   2. Obesity, Class III, BMI 40-49.9 (morbid obesity) ([facility])  E66.01 278.01 LIPID PANEL      METABOLIC PANEL, COMPREHENSIVE      HEMOGLOBIN A1C WITH EAG   3. Colon cancer screening  Z12.11 V76.51 REFERRAL TO GASTROENTEROLOGY   4. Encounter for immunization  Z23 V03.89 ADMIN PNEUMOCOCCAL VACCINE      PNEUMOCOCCAL CONJ VACCINE 13 VALENT IM   5. Essential hypertension  I10 401.9 LIPID PANEL      METABOLIC PANEL, COMPREHENSIVE    not optimal, avoid diuretics due to gout         Plan:     Diabetes Management: Current regimen Metformin 1000 BID, Glimepiride 4mg BID. Prior A1c not at goal.   -A1c, lipid, CMP microalbumin   - Continue current medications for now pending labs.     - Goal glucose concentration >70, <120 pre-meal and <180 with all random glucoses. - Yearly ocular exams and annual foot exams   - Patient explained the importance of medical compliance and the importance of regular home blood glucose monitoring  - Patient explained the importance of exercising 30 minutes a day 5 days a week, the importance of a strict diabetic diet to reduce future cardiovascular risk   - Consider nutrition consult for diabetic education   -PCV13 today ( had 23valent in 2016). Follow up 23 valent booster in 1 year      Colon cancer screening  - REFERRAL TO GASTROENTEROLOGY      Essential hypertension  Continue current meds as BP at goal.  Cont home monitoring   - LIPID PANEL  - METABOLIC PANEL, COMPREHENSIVE      Obesity class III, morbid ([facility] [address]  Discussed continue exercise program and watching diet. - LIPID PANEL    Discussed with patient today that the goal for their diabetes is to have a HgA1C<7 and ideally as close to 6.5 as possible. We discussed diet and medications. The goal for the cholesterol LDL is less than 70 and HDL>40. Patient is aware of the need for yearly eye exams and to take care of their feet daily. Discussed with patient that blood pressure should be less than 130/80 and watching salt intake is very important.      Patient understands the management plan and agrees to comply with the plan      Signed By:  Jayson Valencia DO    Family Medicine Resident

## 2021-09-23 ENCOUNTER — OFFICE VISIT (OUTPATIENT)
Dept: FAMILY MEDICINE CLINIC | Age: 66
End: 2021-09-23
Payer: MEDICARE

## 2021-09-23 VITALS
SYSTOLIC BLOOD PRESSURE: 135 MMHG | BODY MASS INDEX: 41.46 KG/M2 | DIASTOLIC BLOOD PRESSURE: 59 MMHG | OXYGEN SATURATION: 100 % | RESPIRATION RATE: 16 BRPM | HEART RATE: 78 BPM | WEIGHT: 234 LBS | HEIGHT: 63 IN | TEMPERATURE: 97.9 F

## 2021-09-23 DIAGNOSIS — Z12.11 COLON CANCER SCREENING: ICD-10-CM

## 2021-09-23 DIAGNOSIS — E11.9 DIABETES MELLITUS WITHOUT COMPLICATION (HCC): ICD-10-CM

## 2021-09-23 DIAGNOSIS — E66.01 OBESITY, CLASS III, BMI 40-49.9 (MORBID OBESITY) (HCC): ICD-10-CM

## 2021-09-23 DIAGNOSIS — I10 ESSENTIAL HYPERTENSION: ICD-10-CM

## 2021-09-23 DIAGNOSIS — Z23 ENCOUNTER FOR IMMUNIZATION: ICD-10-CM

## 2021-09-23 LAB
ALBUMIN SERPL-MCNC: 3.6 G/DL (ref 3.5–5)
ALBUMIN/GLOB SERPL: 0.9 {RATIO} (ref 1.1–2.2)
ALP SERPL-CCNC: 99 U/L (ref 45–117)
ALT SERPL-CCNC: 13 U/L (ref 12–78)
ANION GAP SERPL CALC-SCNC: 4 MMOL/L (ref 5–15)
AST SERPL-CCNC: 6 U/L (ref 15–37)
BILIRUB SERPL-MCNC: 0.4 MG/DL (ref 0.2–1)
BUN SERPL-MCNC: 18 MG/DL (ref 6–20)
BUN/CREAT SERPL: 19 (ref 12–20)
CALCIUM SERPL-MCNC: 9.3 MG/DL (ref 8.5–10.1)
CHLORIDE SERPL-SCNC: 101 MMOL/L (ref 97–108)
CHOLEST SERPL-MCNC: 152 MG/DL
CO2 SERPL-SCNC: 31 MMOL/L (ref 21–32)
CREAT SERPL-MCNC: 0.97 MG/DL (ref 0.55–1.02)
CREAT UR-MCNC: 123 MG/DL
EST. AVERAGE GLUCOSE BLD GHB EST-MCNC: 212 MG/DL
GLOBULIN SER CALC-MCNC: 4.1 G/DL (ref 2–4)
GLUCOSE SERPL-MCNC: 166 MG/DL (ref 65–100)
HBA1C MFR BLD: 9 % (ref 4–5.6)
HDLC SERPL-MCNC: 100 MG/DL
HDLC SERPL: 1.5 {RATIO} (ref 0–5)
LDLC SERPL CALC-MCNC: 42.2 MG/DL (ref 0–100)
MICROALBUMIN UR-MCNC: <0.5 MG/DL
MICROALBUMIN/CREAT UR-RTO: NORMAL MG/G (ref 0–30)
POTASSIUM SERPL-SCNC: 4 MMOL/L (ref 3.5–5.1)
PROT SERPL-MCNC: 7.7 G/DL (ref 6.4–8.2)
SODIUM SERPL-SCNC: 136 MMOL/L (ref 136–145)
TRIGL SERPL-MCNC: 49 MG/DL (ref ?–150)
VLDLC SERPL CALC-MCNC: 9.8 MG/DL

## 2021-09-23 PROCEDURE — 2022F DILAT RTA XM EVC RTNOPTHY: CPT | Performed by: STUDENT IN AN ORGANIZED HEALTH CARE EDUCATION/TRAINING PROGRAM

## 2021-09-23 PROCEDURE — G8432 DEP SCR NOT DOC, RNG: HCPCS | Performed by: STUDENT IN AN ORGANIZED HEALTH CARE EDUCATION/TRAINING PROGRAM

## 2021-09-23 PROCEDURE — G8427 DOCREV CUR MEDS BY ELIG CLIN: HCPCS | Performed by: STUDENT IN AN ORGANIZED HEALTH CARE EDUCATION/TRAINING PROGRAM

## 2021-09-23 PROCEDURE — G0463 HOSPITAL OUTPT CLINIC VISIT: HCPCS | Performed by: STUDENT IN AN ORGANIZED HEALTH CARE EDUCATION/TRAINING PROGRAM

## 2021-09-23 PROCEDURE — G8536 NO DOC ELDER MAL SCRN: HCPCS | Performed by: STUDENT IN AN ORGANIZED HEALTH CARE EDUCATION/TRAINING PROGRAM

## 2021-09-23 PROCEDURE — G8752 SYS BP LESS 140: HCPCS | Performed by: STUDENT IN AN ORGANIZED HEALTH CARE EDUCATION/TRAINING PROGRAM

## 2021-09-23 PROCEDURE — 3052F HG A1C>EQUAL 8.0%<EQUAL 9.0%: CPT | Performed by: STUDENT IN AN ORGANIZED HEALTH CARE EDUCATION/TRAINING PROGRAM

## 2021-09-23 PROCEDURE — G8399 PT W/DXA RESULTS DOCUMENT: HCPCS | Performed by: STUDENT IN AN ORGANIZED HEALTH CARE EDUCATION/TRAINING PROGRAM

## 2021-09-23 PROCEDURE — 1090F PRES/ABSN URINE INCON ASSESS: CPT | Performed by: STUDENT IN AN ORGANIZED HEALTH CARE EDUCATION/TRAINING PROGRAM

## 2021-09-23 PROCEDURE — 99213 OFFICE O/P EST LOW 20 MIN: CPT | Performed by: STUDENT IN AN ORGANIZED HEALTH CARE EDUCATION/TRAINING PROGRAM

## 2021-09-23 PROCEDURE — 1101F PT FALLS ASSESS-DOCD LE1/YR: CPT | Performed by: STUDENT IN AN ORGANIZED HEALTH CARE EDUCATION/TRAINING PROGRAM

## 2021-09-23 PROCEDURE — G9899 SCRN MAM PERF RSLTS DOC: HCPCS | Performed by: STUDENT IN AN ORGANIZED HEALTH CARE EDUCATION/TRAINING PROGRAM

## 2021-09-23 PROCEDURE — G8754 DIAS BP LESS 90: HCPCS | Performed by: STUDENT IN AN ORGANIZED HEALTH CARE EDUCATION/TRAINING PROGRAM

## 2021-09-23 PROCEDURE — G8417 CALC BMI ABV UP PARAM F/U: HCPCS | Performed by: STUDENT IN AN ORGANIZED HEALTH CARE EDUCATION/TRAINING PROGRAM

## 2021-09-23 PROCEDURE — 3017F COLORECTAL CA SCREEN DOC REV: CPT | Performed by: STUDENT IN AN ORGANIZED HEALTH CARE EDUCATION/TRAINING PROGRAM

## 2021-09-23 PROCEDURE — 90670 PCV13 VACCINE IM: CPT | Performed by: STUDENT IN AN ORGANIZED HEALTH CARE EDUCATION/TRAINING PROGRAM

## 2021-09-23 NOTE — PROGRESS NOTES
Chief Complaint   Patient presents with    Follow Up Chronic Condition     Here to have labs drawn. C/o R shoulder pain and low back pain--thinks it is arthritis. Not interested in scheduling medicare annual wellness at this time. Wants to wait until October to get flu shot.      Visit Vitals  BP (!) 135/59 (BP 1 Location: Left arm, BP Patient Position: Sitting, BP Cuff Size: Adult long)   Pulse 78   Temp 97.9 °F (36.6 °C) (Temporal)   Resp 16   Ht 5' 3\" (1.6 m)   Wt 234 lb (106.1 kg)   SpO2 100%   BMI 41.45 kg/m²

## 2021-09-23 NOTE — PROGRESS NOTES
[address] [facility] [address] [place] [address] [facility]    Office [phone], Fax [phone]    Subjective:     Chief Complaint   Patient presents with    Follow Up Chronic Condition     Here to have labs drawn. C/o R shoulder pain and low back pain--thinks it is arthritis. Not interested in scheduling medicare annual wellness at this time. Wants to wait until October to get flu shot. History provided by patient     HPI:  Ethan Hanna is a 77 y.o. BLACK/ female with past medical history of DM2, HTN, hyperlipidemia, and asthma  presents for routine follow up and labs. Chief Complaint   Patient presents with    Follow Up Chronic Condition     Here to have labs drawn. C/o R shoulder pain and low back pain--thinks it is arthritis. Not interested in scheduling medicare annual wellness at this time. Wants to wait until October to get flu shot. Diabetes  Patient reports that her glucometer is broken and has not been checking her blood sugar. When her machine was functional, she reports checking once every other month. She does not remember what her glucose typically ran around. She reports being compliant with her Metformin 1000 mg BID and her glimepiride 4 mg twice a day. She reports having no hypoglycemic episodes and denies numbness, tingling, polyuria, and polydipsia. She has had her diabetic eye exam 3 months ago and reports no complications with it. Her diet is well-balanced and she reports that she has cut down on salt. She reports drinking only diet soda, eating a variety of fruit and eating sweets every now and then. Hypertension  Patient reports being compliant with her medications. She reports that she checks her blood pressures about 3 times a week and that they typically run in the 130s/60s.       Social History     Socioeconomic History    Marital status: SINGLE     Spouse name: Not on file    Number of children: Not on file    Years of education: Not on file    Highest education level: Not on file   Occupational History    Not on file   Tobacco Use    Smoking status: Never Smoker    Smokeless tobacco: Never Used   Vaping Use    Vaping Use: Never used   Substance and Sexual Activity    Alcohol use: No    Drug use: No    Sexual activity: Never   Other Topics Concern    Not on file   Social History Narrative    Not on file     Social Determinants of Health     Financial Resource Strain:     Difficulty of Paying Living Expenses:    Food Insecurity:     Worried About Running Out of Food in the Last Year:     [address] in the Last Year:    Transportation Needs:     Lack of Transportation (Medical):      Lack of Transportation (Non-Medical):    Physical Activity:     Days of Exercise per Week:     Minutes of Exercise per Session:    Stress:     Feeling of Stress :    Social Connections:     Frequency of Communication with Friends and Family:     Frequency of Social Gatherings with Friends and Family:     Attends L[employer] day Services:     Active Member of Clubs or Organizations:     Attends Club or Organization Meetings:     Marital Status:    Intimate Partner Violence:     Fear of Current or Ex-Partner:     Emotionally Abused:     Physically Abused:     Sexually Abused:        ROS  Review of Systems  Gen: denies weight changes, fatigue, difficulty sleeping, fevers, chills, night sweats  Vision: denies any changes  Head/Neck: denies hearing changes/discharge, voice changes  Pulmonary: reports occasional shortness of breath and wheezing with her asthma; denies cough, hemoptysis, snoring  Cardiovascular: denies chest pain, rapid/irregular heartbeats, syncope  Gastrointestinal: denies nausea, vomiting, heartburn, abdominal pain, melena, constipation, diarrhea  Genitourinary: denies dysuria, hematuria blood, incontinence, urgency, frequency    Objective:     Visit Vitals  BP (!) 135/59 (BP 1 Location: Left arm, BP Patient Position: Sitting, BP Cuff Size: Adult long)   Pulse 78   Temp 97.9 °F (36.6 °C) (Temporal)   Resp 16   Ht 5' 3\" (1.6 m)   Wt 234 lb (106.1 kg)   LMP 09/02/2007   SpO2 100%   BMI 41.45 kg/m²        Physical Exam    GENERAL: obese female in no signs of acute distress  HEENT: PERRLA, no changes in vision or hearing, no LAD  RESP: clear to auscultation bilaterally  CV: normal rate regular rhythm, normal K2/Z1, no murmurs, clicks, or rubs. ABD: soft, nontender to palpation in all quadrants, no masses, no hepatosplenomegaly  Diabetic Foot Exam: patient was able to sense all points tested during the monofilament test bilaterally    Assessment and orders:     1. Diabetes  -  Obtain a microalbumin, CMP, and Hemoglobin A1C today. Will hold off on adjusting medications until results come back. -  Recommended that the patient continue exercising and to continue making dietary modifications for her diabetes. -  Advised to patient to get a glucometer and to start recording her glucose daily. 2. Hypertension  - Patient reports that her home blood pressures have been around the office measurement (135/59). - No adjustments need to be made at this time. 3. Hyperlipidemia  - Pending results of lipid panel. Will hold off on adjusting medications until results come back. 4. Care Gaps  - Patient got her pneumonia vaccine today. She does not want the shingles vaccine today. She reports that she will get the flu vaccine in October. She is getting a referral to get her colonoscopy done. Pt was discussed with Dr. Kayleigh Manriquez. I have reviewed patient medical and social history and medications. I have reviewed pertinent labs results and other data. I have discussed the diagnosis with the patient and the intended plan as seen in the above orders. The patient has received an after-visit summary and questions were answered concerning future plans. I have discussed medication side effects and warnings with the patient as well.     Soo Freeman  Resident [facility]  09/23/21    *ATTENTION:  This note has been created by a medical student for educational purposes only. Please do not refer to the content of this note for clinical decision-making, billing, or other purposes. Please see attending physicians note to obtain clinical information on this patient. *

## 2021-10-01 NOTE — PROGRESS NOTES
LDL at goal.    CMP okay- continue to monitor GFR   A1c 9.0 and higher than prior. Will call Pt and arrange follow up to discuss meds.   Will need to discuss adding another med consider SGLT2 and GLP1 given ASCVD risk of 17.5%

## 2021-10-05 ENCOUNTER — TELEPHONE (OUTPATIENT)
Dept: FAMILY MEDICINE CLINIC | Age: 66
End: 2021-10-05

## 2021-10-05 NOTE — TELEPHONE ENCOUNTER
----- Message from Kristin Colvin DO sent at 10/1/2021  5:29 PM EDT -----  Regarding: Follow up needed  Hi all! Please call this patient and arrange for her to have an in person followup to discuss her labs. The visit can either be with me or with another provider     Thanks for all you do!      KG

## 2021-10-07 ENCOUNTER — OFFICE VISIT (OUTPATIENT)
Dept: FAMILY MEDICINE CLINIC | Age: 66
End: 2021-10-07
Payer: MEDICARE

## 2021-10-07 VITALS
WEIGHT: 234 LBS | OXYGEN SATURATION: 100 % | HEART RATE: 78 BPM | SYSTOLIC BLOOD PRESSURE: 129 MMHG | HEIGHT: 63 IN | RESPIRATION RATE: 16 BRPM | DIASTOLIC BLOOD PRESSURE: 55 MMHG | BODY MASS INDEX: 41.46 KG/M2

## 2021-10-07 DIAGNOSIS — M75.51 BURSITIS OF RIGHT SHOULDER: Primary | ICD-10-CM

## 2021-10-07 PROCEDURE — G9899 SCRN MAM PERF RSLTS DOC: HCPCS | Performed by: STUDENT IN AN ORGANIZED HEALTH CARE EDUCATION/TRAINING PROGRAM

## 2021-10-07 PROCEDURE — G8427 DOCREV CUR MEDS BY ELIG CLIN: HCPCS | Performed by: STUDENT IN AN ORGANIZED HEALTH CARE EDUCATION/TRAINING PROGRAM

## 2021-10-07 PROCEDURE — G8432 DEP SCR NOT DOC, RNG: HCPCS | Performed by: STUDENT IN AN ORGANIZED HEALTH CARE EDUCATION/TRAINING PROGRAM

## 2021-10-07 PROCEDURE — 20610 DRAIN/INJ JOINT/BURSA W/O US: CPT | Performed by: STUDENT IN AN ORGANIZED HEALTH CARE EDUCATION/TRAINING PROGRAM

## 2021-10-07 PROCEDURE — 2022F DILAT RTA XM EVC RTNOPTHY: CPT | Performed by: STUDENT IN AN ORGANIZED HEALTH CARE EDUCATION/TRAINING PROGRAM

## 2021-10-07 PROCEDURE — G8417 CALC BMI ABV UP PARAM F/U: HCPCS | Performed by: STUDENT IN AN ORGANIZED HEALTH CARE EDUCATION/TRAINING PROGRAM

## 2021-10-07 PROCEDURE — 3052F HG A1C>EQUAL 8.0%<EQUAL 9.0%: CPT | Performed by: STUDENT IN AN ORGANIZED HEALTH CARE EDUCATION/TRAINING PROGRAM

## 2021-10-07 PROCEDURE — G0463 HOSPITAL OUTPT CLINIC VISIT: HCPCS | Performed by: STUDENT IN AN ORGANIZED HEALTH CARE EDUCATION/TRAINING PROGRAM

## 2021-10-07 PROCEDURE — G8399 PT W/DXA RESULTS DOCUMENT: HCPCS | Performed by: STUDENT IN AN ORGANIZED HEALTH CARE EDUCATION/TRAINING PROGRAM

## 2021-10-07 PROCEDURE — 96372 THER/PROPH/DIAG INJ SC/IM: CPT | Performed by: FAMILY MEDICINE

## 2021-10-07 PROCEDURE — G8754 DIAS BP LESS 90: HCPCS | Performed by: STUDENT IN AN ORGANIZED HEALTH CARE EDUCATION/TRAINING PROGRAM

## 2021-10-07 PROCEDURE — 3017F COLORECTAL CA SCREEN DOC REV: CPT | Performed by: STUDENT IN AN ORGANIZED HEALTH CARE EDUCATION/TRAINING PROGRAM

## 2021-10-07 PROCEDURE — 1090F PRES/ABSN URINE INCON ASSESS: CPT | Performed by: STUDENT IN AN ORGANIZED HEALTH CARE EDUCATION/TRAINING PROGRAM

## 2021-10-07 PROCEDURE — G8536 NO DOC ELDER MAL SCRN: HCPCS | Performed by: STUDENT IN AN ORGANIZED HEALTH CARE EDUCATION/TRAINING PROGRAM

## 2021-10-07 PROCEDURE — 1101F PT FALLS ASSESS-DOCD LE1/YR: CPT | Performed by: STUDENT IN AN ORGANIZED HEALTH CARE EDUCATION/TRAINING PROGRAM

## 2021-10-07 PROCEDURE — G8752 SYS BP LESS 140: HCPCS | Performed by: STUDENT IN AN ORGANIZED HEALTH CARE EDUCATION/TRAINING PROGRAM

## 2021-10-07 PROCEDURE — 99213 OFFICE O/P EST LOW 20 MIN: CPT | Performed by: STUDENT IN AN ORGANIZED HEALTH CARE EDUCATION/TRAINING PROGRAM

## 2021-10-07 RX ORDER — TRIAMCINOLONE ACETONIDE 40 MG/ML
40 INJECTION, SUSPENSION INTRA-ARTICULAR; INTRAMUSCULAR ONCE
Status: COMPLETED | OUTPATIENT
Start: 2021-10-07 | End: 2021-10-07

## 2021-10-07 RX ORDER — LIDOCAINE HYDROCHLORIDE 10 MG/ML
4 INJECTION INFILTRATION; PERINEURAL ONCE
Status: COMPLETED | OUTPATIENT
Start: 2021-10-07 | End: 2021-10-07

## 2021-10-07 RX ADMIN — LIDOCAINE HYDROCHLORIDE 4 ML: 10 INJECTION, SOLUTION INFILTRATION; PERINEURAL at 13:46

## 2021-10-07 RX ADMIN — TRIAMCINOLONE ACETONIDE 40 MG: 40 INJECTION, SUSPENSION INTRA-ARTICULAR; INTRAMUSCULAR at 13:47

## 2021-10-07 NOTE — PROGRESS NOTES
[address] [place] [address]   Office [phone], Fax [phone]    Subjective:     Chief Complaint   Patient presents with   Yao Post     Patient here today to review labs. She would like to wait to have her flu shot. History provided by patient     HPI:  Brittni Ryder is a 77 y.o. BLACK/ female with past medical history of DM, HTN, OA presents for   Chief Complaint   Patient presents with   Yao Post     Patient here today to review labs. She would like to wait to have her flu shot. Rt shoulder pain   Intermittent pain x1 month. No neck pain, n/t/w in Rt arm or hand. Rt hand dominant. Never had steroid injection in her shoulder but has had one in her knee 2-3 years ago for arthritis. Tylenol provides temporary relief. Pain is made worse with laying on her right side and is usually the worst in the AM.        Date of Onset:1 month ago   Mechanism of Injury: none. Denies fall or injury. Does not do heavy lifting. Alleviating Factors:rest   Aggravating Factors: laying on her Rt side      Imaging: none         DM uncontrolled   A1c increased to 9.0 from 8.0. ASCVD risk of 17.5% would benefit from SGLT2 or GLP1. Currently on Metformin 1000mg BID and Glimiperide 4mg BID. Denies any vision changes, HA, polyuria, polydypsia. No numbness, tingling or weakness in arms or legs.          Social History     Socioeconomic History    Marital status: SINGLE     Spouse name: Not on file    Number of children: Not on file    Years of education: Not on file    Highest education level: Not on file   Occupational History    Not on file   Tobacco Use    Smoking status: Never Smoker    Smokeless tobacco: Never Used   Vaping Use    Vaping Use: Never used   Substance and Sexual Activity    Alcohol use: No    Drug use: No    Sexual activity: Never   Other Topics Concern    Not on file   Social History Narrative    Not on file     Social Determinants of Health Financial Resource Strain:     Difficulty of Paying Living Expenses:    Food Insecurity:     Worried About Running Out of Food in the Last Year:     [address] in the Last Year:    Transportation Needs:     Lack of Transportation (Medical):  Lack of Transportation (Non-Medical):    Physical Activity:     Days of Exercise per Week:     Minutes of Exercise per Session:    Stress:     Feeling of Stress :    Social Connections:     Frequency of Communication with Friends and Family:     Frequency of Social Gatherings with Friends and Family:     Attends Yaz[employer] Services:     Active Member of Clubs or Organizations:     Attends Club or Organization Meetings:     Marital Status:    Intimate Partner Violence:     Fear of Current or Ex-Partner:     Emotionally Abused:     Physically Abused:     Sexually Abused:          Review of Systems   Constitutional: Negative for chills and fever. HENT: Negative for congestion and sinus pain. Eyes: Negative for double vision. Respiratory: Negative for cough and shortness of breath. Cardiovascular: Negative for chest pain and palpitations. Gastrointestinal: Negative for abdominal pain, nausea and vomiting. Genitourinary: Negative for dysuria and hematuria. Musculoskeletal: Positive for joint pain (Rt shoulder). Negative for back pain, falls, myalgias and neck pain. Skin: Negative for rash. Neurological: Negative for dizziness and headaches.          Objective:     Visit Vitals  BP (!) 129/55 (BP 1 Location: Left arm, BP Patient Position: Sitting, BP Cuff Size: Adult long)   Pulse 78   Resp 16   Ht 5' 3\" (1.6 m)   Wt 234 lb (106.1 kg)   LMP 09/02/2007   SpO2 100%   BMI 41.45 kg/m²          Physical Exam  Shoulder: right  Deformity: None    ROM:  Forward Flexion: Active: 180   Passive: 180  ER (0): Active: 45   Passive: 45  IR (0): Active: Behind the body to the level lumbar spine bilaterally and equal   Abduction: Active: 180   Passive: 180    Palpation:  AC tenderness: None  SC tenderness: None  Clavicle tenderness: None  Biceps tenderness: None      Strength (0-5/5):  Deltoid  Anterior: 5/5  Deltoid  Posterior: 5/5  Deltoid  Mid: 5/5  Supraspinatus: 5/5  External rotation: 5/5  Internal rotation: 5/5    Rotator Cuff Exam:  Neers sign: Negative  Ward sign: positive    Painful Arc: Negative  Lift-off sign / Belly Press: Negative    Biceps/Labrum/AC Exam:  Yergasons Test: Negative  Speeds Test: Negative  OGarcías Sign: Negative  Cross-chest adduction: Negative    Instability:  Anterior apprehension: Negative  Relocation test: Negative  Posterior apprehension: Negative  Anterior drawer: Negative  Posterior drawer: Negative  Sulcus sign: Negative    Neuro/Vascular:  Pulses intact, no edema, and neurologically intact    C-Spine:  Cervical motion: FROM without pain.   Cervical tenderness: None      [facility]  OFFICE PROCEDURE PROGRESS NOTE        Chart reviewed for the following:   Pilar RIVAS DO, have reviewed the History, Physical and updated the Allergic reactions for [address] performed immediately prior to start of procedure:   Pilar RIVAS DO, have performed the following reviews on [address] [place] [address] prior to the start of the procedure:            * Patient was identified by name and date of birth   * Agreement on procedure being performed was verified  * Risks and Benefits explained to the patient  * Procedure site verified and marked as necessary  * Patient was positioned for comfort  * Consent was signed and verified     Time: 1:37 PM    Date of procedure: 10/7/2021    Procedure performed by:  Pilar Braden DO    Provider assisted by: Radha Mayers LPN    Patient assisted by: self    How tolerated by patient: tolerated the procedure well with no complications    Post Procedural Pain Scale: 0 - No Hurt    Comments: none    PROCEDURE NOTE:       Informed consent obtained verbally and risks, benefits and alternatives discussed. Time out performed, cross checking patient ID and procedure. The Left shoulder was cleaned and prepped with sterile technique using Chloraprep x3, anesthetized with ethyl chloride spray and injected from a posterior lateral approach with 1ml Kenalog and 4 ml of 1% lidocaine. The patient tolerated the procedure well and there were no complications. Assessment and orders:   1. Bursitis of right shoulder  Pt exam and symptoms are most consistent with subacromial bursitis. No bony tenderness present. I suspect there is also some arthritis present however forewent XR as it was not readily available in our clinic. Last steroid injection was in her knee 2-3 years ago. Subacromial bursa (Rt) steroid injection performed at bedside with improvement shortly after procedure. - lidocaine (XYLOCAINE) 10 mg/mL (1 %) injection 4 mL  - triamcinolone acetonide (KENALOG-40) 40 mg/mL injection 40 mg  - DRAIN/INJECT LARGE JOINT/BURSA  - continue home exercises with theraband 2-3 times per day   -continue with tylenol prn   -return if no better in 4 weeks     2. Type II diabetes mellitus with complication, uncontrolled (HCC)  A1c increased to 9.0 from 8.0. ASCVD risk of 17.5%. Would benefit from SGLT2 or GLP1 for renal and cardioprotection   -continue Metformin 1000mg BID and Glipizide 4mg BID (maxed on both)   - empagliflozin (JARDIANCE) 10 mg tablet; Take 1 Tablet by mouth daily for 90 days. Dispense: 90 Tablet; Refill: 1              Pt was discussed with Dr. Aj Sanchez  (attending physician). I have reviewed patient medical and social history and medications. I have reviewed pertinent labs results and other data. I have discussed the diagnosis with the patient and the intended plan as seen in the above orders. The patient has received an after-visit summary and questions were answered concerning future plans.  I have discussed medication side effects and warnings with the patient as well.     Cammy Sawyer DO  Resident [facility]  10/07/21

## 2021-10-07 NOTE — PROGRESS NOTES
Chief Complaint   Patient presents with   [facility]     Patient here today to review labs. She would like to wait to have her flu shot.      Visit Vitals  BP (!) 129/55 (BP 1 Location: Left arm, BP Patient Position: Sitting, BP Cuff Size: Adult long)   Pulse 78   Resp 16   Ht 5' 3\" (1.6 m)   Wt 234 lb (106.1 kg)   SpO2 100%   BMI 41.45 kg/m²

## 2021-10-07 NOTE — PATIENT INSTRUCTIONS
Rotator Cuff: Exercises  Introduction  Here are some examples of exercises for you to try. The exercises may be suggested for a condition or for rehabilitation. Start each exercise slowly. Ease off the exercises if you start to have pain. You will be told when to start these exercises and which ones will work best for you. How to do the exercises  Pendulum swing    If you have pain in your back, do not do this exercise. 1. Hold on to a table or the back of a chair with your good arm. Then bend forward a little and let your sore arm hang straight down. This exercise does not use the arm muscles. Rather, use your legs and your hips to create movement that makes your arm swing freely. 2. Use the movement from your hips and legs to guide the slightly swinging arm back and forth like a pendulum (or elephant trunk). Then guide it in circles that start small (about the size of a dinner plate). Make the circles a bit larger each day, as your pain allows. 3. Do this exercise for 5 minutes, 5 to 7 times each day. 4. As you have less pain, try bending over a little farther to do this exercise. This will increase the amount of movement at your shoulder. Posterior stretching exercise    1. Hold the elbow of your injured arm with your other hand. 2. Use your hand to pull your injured arm gently up and across your body. You will feel a gentle stretch across the back of your injured shoulder. 3. Hold for at least 15 to 30 seconds. Then slowly lower your arm. 4. Repeat 2 to 4 times. Up-the-back stretch    Your doctor or physical therapist may want you to wait to do this stretch until you have regained most of your range of motion and strength. You can do this stretch in different ways. Hold any of these stretches for at least 15 to 30 seconds. Repeat them 2 to 4 times. 1. Light stretch: Put your hand in your back pocket. Let it rest there to stretch your shoulder. 2. Moderate stretch:  With your other hand, hold your injured arm (palm outward) behind your back by the wrist. Pull your arm up gently to stretch your shoulder. 3. Advanced stretch: Put a towel over your other shoulder. Put the hand of your injured arm behind your back. Now hold the back end of the towel. With the other hand, hold the front end of the towel in front of your body. Pull gently on the front end of the towel. This will bring your hand farther up your back to stretch your shoulder. Overhead stretch    1. Standing about an arm's length away, grasp onto a solid surface. You could use a countertop, a doorknob, or the back of a sturdy chair. 2. With your knees slightly bent, bend forward with your arms straight. Lower your upper body, and let your shoulders stretch. 3. As your shoulders are able to stretch farther, you may need to take a step or two backward. 4. Hold for at least 15 to 30 seconds. Then stand up and relax. If you had stepped back during your stretch, step forward so you can keep your hands on the solid surface. 5. Repeat 2 to 4 times. Shoulder flexion (lying down)    To make a wand for this exercise, use a piece of PVC pipe or a broom handle with the broom removed. Make the wand about a foot wider than your shoulders. 1. Lie on your back, holding a wand with both hands. Your palms should face down as you hold the wand. 2. Keeping your elbows straight, slowly raise your arms over your head. Raise them until you feel a stretch in your shoulders, upper back, and chest.  3. Hold for 15 to 30 seconds. 4. Repeat 2 to 4 times. Shoulder rotation (lying down)    To make a wand for this exercise, use a piece of PVC pipe or a broom handle with the broom removed. Make the wand about a foot wider than your shoulders. 1. Lie on your back. Hold a wand with both hands with your elbows bent and palms up. 2. Keep your elbows close to your body, and move the wand across your body toward the sore arm. 3. Hold for 8 to 12 seconds.   4. Repeat 2 to 4 times. Wall climbing (to the side)    Avoid any movement that is straight to your side, and be careful not to arch your back. Your arm should stay about 30 degrees to the front of your side. 1. Stand with your side to a wall so that your fingers can just touch it at an angle about 30 degrees toward the front of your body. 2. Walk the fingers of your injured arm up the wall as high as pain permits. Try not to shrug your shoulder up toward your ear as you move your arm up. 3. Hold that position for a count of at least 15 to 20.  4. Walk your fingers back down to the starting position. 5. Repeat at least 2 to 4 times. Try to reach higher each time. Wall climbing (to the front)    During this stretching exercise, be careful not to arch your back. 1. Face a wall, and stand so your fingers can just touch it. 2. Keeping your shoulder down, walk the fingers of your injured arm up the wall as high as pain permits. (Don't shrug your shoulder up toward your ear.)  3. Hold your arm in that position for at least 15 to 30 seconds. 4. Slowly walk your fingers back down to where you started. 5. Repeat at least 2 to 4 times. Try to reach higher each time. Shoulder blade squeeze    1. Stand with your arms at your sides, and squeeze your shoulder blades together. Do not raise your shoulders up as you squeeze. 2. Hold 6 seconds. 3. Repeat 8 to 12 times. Scapular exercise: Arm reach    1. Lie flat on your back. This exercise is a very slight motion that starts with your arms raised (elbows straight, arms straight). 2. From this position, reach higher toward the lore or ceiling. Keep your elbows straight. All motion should be from your shoulder blade only. 3. Relax your arms back to where you started. 4. Repeat 8 to 12 times. Arm raise to the side    During this strengthening exercise, your arm should stay about 30 degrees to the front of your side.   1. Slowly raise your injured arm to the side, with your thumb facing up. Raise your arm 60 degrees at the most (shoulder level is 90 degrees). 2. Hold the position for 3 to 5 seconds. Then lower your arm back to your side. If you need to, bring your \"good\" arm across your body and place it under the elbow as you lower your injured arm. Use your good arm to keep your injured arm from dropping down too fast.  3. Repeat 8 to 12 times. 4. When you first start out, don't hold any extra weight in your hand. As you get stronger, you may use a 1-pound to 2-pound dumbbell or a small can of food. Shoulder flexor and extensor exercise    These are isometric exercises. That means you contract your muscles without actually moving. 1. Push forward (flex): Stand facing a wall or doorjamb, about 6 inches or less back. Hold your injured arm against your body. Make a closed fist with your thumb on top. Then gently push your hand forward into the wall with about 25% to 50% of your strength. Don't let your body move backward as you push. Hold for about 6 seconds. Relax for a few seconds. Repeat 8 to 12 times. 2. Push backward (extend): Stand with your back flat against a wall. Your upper arm should be against the wall, with your elbow bent 90 degrees (your hand straight ahead). Push your elbow gently back against the wall with about 25% to 50% of your strength. Don't let your body move forward as you push. Hold for about 6 seconds. Relax for a few seconds. Repeat 8 to 12 times. Scapular exercise: Wall push-ups    This exercise is best done with your fingers somewhat turned out, rather than straight up and down. 1. Stand facing a wall, about 12 inches to 18 inches away. 2. Place your hands on the wall at shoulder height. 3. Slowly bend your elbows and bring your face to the wall. Keep your back and hips straight. 4. Push back to where you started. 5. Repeat 8 to 12 times. 6. When you can do this exercise against a wall comfortably, you can try it against a counter.  You can then slowly progress to the end of a couch, then to a sturdy chair, and finally to the floor. Scapular exercise: Retraction    For this exercise, you will need elastic exercise material, such as surgical tubing or Thera-Band. 1. Put the band around a solid object at about waist level. (A bedpost will work well.) Each hand should hold an end of the band. 2. With your elbows at your sides and bent to 90 degrees, pull the band back. Your shoulder blades should move toward each other. Then move your arms back where you started. 3. Repeat 8 to 12 times. 4. If you have good range of motion in your shoulders, try this exercise with your arms lifted out to the sides. Keep your elbows at a 90-degree angle. Raise the elastic band up to about shoulder level. Pull the band back to move your shoulder blades toward each other. Then move your arms back where you started. Internal rotator strengthening exercise    1. Start by tying a piece of elastic exercise material to a doorknob. You can use surgical tubing or Thera-Band. 2. Stand or sit with your shoulder relaxed and your elbow bent 90 degrees. Your upper arm should rest comfortably against your side. Squeeze a rolled towel between your elbow and your body for comfort. This will help keep your arm at your side. 3. Hold one end of the elastic band in the hand of the painful arm. 4. Slowly rotate your forearm toward your body until it touches your belly. Slowly move it back to where you started. 5. Keep your elbow and upper arm firmly tucked against the towel roll or at your side. 6. Repeat 8 to 12 times. External rotator strengthening exercise    1. Start by tying a piece of elastic exercise material to a doorknob. You can use surgical tubing or Thera-Band. (You may also hold one end of the band in each hand.)  2. Stand or sit with your shoulder relaxed and your elbow bent 90 degrees. Your upper arm should rest comfortably against your side.  Squeeze a rolled towel between your elbow and your body for comfort. This will help keep your arm at your side. 3. Hold one end of the elastic band with the hand of the painful arm. 4. Start with your forearm across your belly. Slowly rotate the forearm out away from your body. Keep your elbow and upper arm tucked against the towel roll or the side of your body until you begin to feel tightness in your shoulder. Slowly move your arm back to where you started. 5. Repeat 8 to 12 times. Follow-up care is a key part of your treatment and safety. Be sure to make and go to all appointments, and call your doctor if you are having problems. It's also a good idea to know your test results and keep a list of the medicines you take. Where can you learn more? Go to http://www.gray.com/  Enter J005 in the search box to learn more about \"Rotator Cuff: Exercises. \"  Current as of: July 1, 2021               Content Version: 13.0  © 2006-2021 [employer]. Care instructions adapted under license by [employer] (which disclaims liability or warranty for this information). If you have questions about a medical condition or this instruction, always ask your healthcare professional. [place] [address] any warranty or liability for your use of this information.

## 2021-10-08 ENCOUNTER — TELEPHONE (OUTPATIENT)
Dept: FAMILY MEDICINE CLINIC | Age: 66
End: 2021-10-08

## 2021-10-08 NOTE — TELEPHONE ENCOUNTER
[address]  [facility],[address] (Pharmacy) [phone]       Pharmacy states that medication is to expensive, about $1500. Pt has no RX coverage in can only have generic.

## 2021-10-20 NOTE — TELEPHONE ENCOUNTER
Called pharmacy and Pt does not have part D medicare coverage. All of her current meds for DM are on [employer] list so they are reduced or free. Pharmacist is going to look in to medication options to assist with this as Pt is not quite at an A1c level of needed Insulin and I (along with the patient) would like to avoid insulin at all costs.

## 2021-10-29 ENCOUNTER — VIRTUAL VISIT (OUTPATIENT)
Dept: FAMILY MEDICINE CLINIC | Age: 66
End: 2021-10-29

## 2021-10-29 NOTE — Clinical Note
Please take this visit off for this patient and ashlyn it as erroneous. Her phone wasn't working and I don't want her to get charged. Please schedule her for an IN PERSON visit.   30 minute slot in December with me or another provider to follow up on DM and MAW

## 2021-11-05 ENCOUNTER — OFFICE VISIT (OUTPATIENT)
Dept: FAMILY MEDICINE CLINIC | Age: 66
End: 2021-11-05
Payer: MEDICARE

## 2021-11-05 VITALS
SYSTOLIC BLOOD PRESSURE: 126 MMHG | DIASTOLIC BLOOD PRESSURE: 51 MMHG | BODY MASS INDEX: 40.22 KG/M2 | OXYGEN SATURATION: 99 % | RESPIRATION RATE: 16 BRPM | HEART RATE: 68 BPM | HEIGHT: 63 IN | WEIGHT: 227 LBS

## 2021-11-05 DIAGNOSIS — Z00.00 MEDICARE ANNUAL WELLNESS VISIT, SUBSEQUENT: Primary | ICD-10-CM

## 2021-11-05 DIAGNOSIS — Z23 ENCOUNTER FOR IMMUNIZATION: ICD-10-CM

## 2021-11-05 DIAGNOSIS — Z13.39 SCREENING FOR ALCOHOLISM: ICD-10-CM

## 2021-11-05 DIAGNOSIS — Z71.89 ADVANCED DIRECTIVES, COUNSELING/DISCUSSION: ICD-10-CM

## 2021-11-05 DIAGNOSIS — Z13.31 SCREENING FOR DEPRESSION: ICD-10-CM

## 2021-11-05 DIAGNOSIS — J30.89 ENVIRONMENTAL AND SEASONAL ALLERGIES: ICD-10-CM

## 2021-11-05 PROCEDURE — 99497 ADVNCD CARE PLAN 30 MIN: CPT | Performed by: STUDENT IN AN ORGANIZED HEALTH CARE EDUCATION/TRAINING PROGRAM

## 2021-11-05 PROCEDURE — 3052F HG A1C>EQUAL 8.0%<EQUAL 9.0%: CPT | Performed by: STUDENT IN AN ORGANIZED HEALTH CARE EDUCATION/TRAINING PROGRAM

## 2021-11-05 PROCEDURE — G0442 ANNUAL ALCOHOL SCREEN 15 MIN: HCPCS | Performed by: STUDENT IN AN ORGANIZED HEALTH CARE EDUCATION/TRAINING PROGRAM

## 2021-11-05 PROCEDURE — 90694 VACC AIIV4 NO PRSRV 0.5ML IM: CPT

## 2021-11-05 PROCEDURE — G0439 PPPS, SUBSEQ VISIT: HCPCS | Performed by: STUDENT IN AN ORGANIZED HEALTH CARE EDUCATION/TRAINING PROGRAM

## 2021-11-05 RX ORDER — FLUTICASONE PROPIONATE 50 MCG
2 SPRAY, SUSPENSION (ML) NASAL DAILY
Qty: 1 EACH | Refills: 3 | Status: SHIPPED | OUTPATIENT
Start: 2021-11-05 | End: 2022-03-01 | Stop reason: SDUPTHER

## 2021-11-05 RX ORDER — CLONIDINE HYDROCHLORIDE 0.2 MG/1
0.2 TABLET ORAL 2 TIMES DAILY
COMMUNITY
Start: 2021-10-27 | End: 2021-11-29

## 2021-11-05 RX ORDER — FAMOTIDINE 40 MG/1
40 TABLET, FILM COATED ORAL
COMMUNITY
Start: 2021-10-26 | End: 2021-11-22

## 2021-11-05 RX ORDER — METFORMIN HYDROCHLORIDE 750 MG/1
750 TABLET, EXTENDED RELEASE ORAL 2 TIMES DAILY
Qty: 180 TABLET | Refills: 3 | Status: SHIPPED | OUTPATIENT
Start: 2021-11-05 | End: 2022-02-03

## 2021-11-05 NOTE — ACP (ADVANCE CARE PLANNING)
Advance Care Planning     Advance Care Planning (ACP) Physician/NP/PA Conversation      Date of Conversation: 11/5/2021  Conducted with: Patient with Decision Making Capacity    Healthcare Decision Maker:   No healthcare decision makers have been documented. Click here to complete [address] including selection of the Healthcare Decision Maker Relationship (ie \"Primary\")      Today we documented Decision Maker(s) consistent with Legal Next of Kin hierarchy. Care Preferences:    Hospitalization: \"If your health worsens and it becomes clear that your chance of recovery is unlikely, what would be your preference regarding hospitalization? \"  The patient is unsure. Ventilation: \"If you were unable to breathe on your own and your chance of recovery was unlikely, what would be your preference about the use of a ventilator (breathing machine) if it was available to you? \"   The patient would desire the use of a ventilator. and would like to try to be weaned off    Resuscitation: \"In the event your heart stopped as a result of an underlying serious health condition, would you want attempts to be made to restart your heart, or would you prefer a natural death? \"   Yes, attempt to resuscitate.     Additional topics discussed: treatment goals and benefit/burden of treatment options    Conversation Outcomes / Follow-Up Plan:   ACP in process - information provided, considering goals and options  Reviewed DNR/DNI and patient elects Full Code (Attempt Resuscitation)     Length of Voluntary ACP Conversation in minutes:  16 minutes    Ricarda Morrison DO

## 2021-11-05 NOTE — PATIENT INSTRUCTIONS
1. Call Dr. Taras Car doctor for your coloscopy [phone] 2. Change your metformin to Metformin Extended Release 750mg twice a day Medicare Wellness Visit, Female The best way to live healthy is to have a lifestyle where you eat a well-balanced diet, exercise regularly, limit alcohol use, and quit all forms of tobacco/nicotine, if applicable. Regular preventive services are another way to keep healthy. Preventive services (vaccines, screening tests, monitoring & exams) can help personalize your care plan, which helps you manage your own care. Screening tests can find health problems at the earliest stages, when they are easiest to treat. [facility] follows the current, evidence-based guidelines published by the [facility] ([facility]) when recommending preventive services for our patients. Because we follow these guidelines, sometimes recommendations change over time as research supports it. (For example, mammograms used to be recommended annually. Even though Medicare will still pay for an annual mammogram, the newer guidelines recommend a mammogram every two years for women of average risk). Of course, you and your doctor may decide to screen more often for some diseases, based on your risk and your co-morbidities (chronic disease you are already diagnosed with). Preventive services for you include: - Medicare offers their members a free annual wellness visit, which is time for you and your primary care provider to discuss and plan for your preventive service needs. Take advantage of this benefit every year! 
-All adults over the age of 72 should receive the recommended pneumonia vaccines.  Current USPSTF guidelines recommend a series of two vaccines for the best pneumonia protection.  
-All adults should have a flu vaccine yearly and a tetanus vaccine every 10 years.  
-All adults age 48 and older should receive the shingles vaccines (series of two vaccines). -All adults age 38-68 who are overweight should have a diabetes screening test once every three years.  
-All adults born between [birth year] and 1965 should be screened once for Hepatitis C. 
-Other screening tests and preventive services for persons with diabetes include: an eye exam to screen for diabetic retinopathy, a kidney function test, a foot exam, and stricter control over your cholesterol.  
-Cardiovascular screening for adults with routine risk involves an electrocardiogram (ECG) at intervals determined by your doctor.  
-Colorectal cancer screenings should be done for adults age 54-65 with no increased risk factors for colorectal cancer. There are a number of acceptable methods of screening for this type of cancer. Each test has its own benefits and drawbacks. Discuss with your doctor what is most appropriate for you during your annual wellness visit. The different tests include: colonoscopy (considered the best screening method), a fecal occult blood test, a fecal DNA test, and sigmoidoscopy. 
 
-A bone mass density test is recommended when a woman turns 65 to screen for osteoporosis. This test is only recommended one time, as a screening. Some providers will use this same test as a disease monitoring tool if you already have osteoporosis. -Breast cancer screenings are recommended every other year for women of normal risk, age 54-69. 
-Cervical cancer screenings for women over age 72 are only recommended with certain risk factors. Here is a list of your current Health Maintenance items (your personalized list of preventive services) with a due date: 
Health Maintenance Due Topic Date Due  Eye Exam  Never done  Shingles Vaccine (1 of 2) Never done  Colorectal Screening  10/01/2016 [facility] Diabetic Foot Care  11/14/2020  Yearly Flu Vaccine (1) 09/01/2021

## 2021-11-05 NOTE — PROGRESS NOTES
This is the Subsequent Medicare Annual Wellness Exam, performed 12 months or more after the Initial AWV or the last Subsequent AWV    I have reviewed the patient's medical history in detail and updated the computerized patient record. Assessment/Plan   Education and counseling provided:  Are appropriate based on today's review and evaluation  Influenza Vaccine  Colorectal cancer screening tests  Screening for glaucoma  Diabetes outpatient self-management training services    1. Medicare annual wellness visit, subsequent  -     [place] AN[place] SCREEN [address]  2. Advanced directives, counseling/discussion  -     ADVANCE CARE PLANNING FIRST 30 MINS  -     FULL CODE  3. Body mass index 40.0-44.9, adult (HCC)- down 7 lb in past 2 months. Continue weight loss and watching diet   4. Screening for alcoholism  -     [place] ANNUAL ALCOHOL SCREEN 15 MIN  5. Screening for depression  -     DEPRESSION SCREEN ANNUAL  6. Encounter for immunization  -     FLU (FLUAD QUAD INFLUENZA VACCINE,QUAD,ADJUVANTED)  -     ADMIN INFLUENZA VIRUS VAC  7. Type II diabetes mellitus with complication, uncontrolled ([facility]  -     Issues with immediate release and diarrhea. Will switch to XR BID to see if this helps. - A1c at 9.0 in 9/2021. Will recheck A1c in 1/2022 and if continues to go up may need to start insulin as Pt does not have Medicare part D (advised to sign up for this)   - metFORMIN ER (GLUCOPHAGE XR) 750 mg tablet; Take 1 Tablet by mouth two (2) times a day for 90 days. , Normal, Disp-180 Tablet, R-3  -     HEMOGLOBIN A1C WITH EAG; Future  8. Environmental and seasonal allergies  -     fluticasone propionate (FLONASE) 50 mcg/actuation nasal spray; 2 Sprays by Both Nostrils route daily. , Normal, Disp-1 Each, R-3       Depression Risk Factor Screening     3 most recent PHQ Screens 11/5/2021   Little interest or pleasure in doing things Not at all   Feeling down, depressed, irritable, or hopeless Not at all   Total Score PHQ 2 0       Alcohol Risk Screen    Do you average more than 1 drink per night or more than 7 drinks a week: Only on special occasions     On any one occasion in the past three months have you have had more than 3 drinks containing alcohol:  No      General Health Questions   -During the past 4 weeks:   -how would you rate your health in general? Good   -how often have you been bothered by feeling dizzy when standing up? never -how much have you been bothered by bodily pain? mildly   -Have you noticed any hearing difficulties? no   -has your physical and emotional health limited your social activities with family or friends? no    Emotional Health Questions   -Do you have a history of depression, anxiety, or emotional problems? no  -Over the past 2 weeks, have you felt down, depressed or hopeless? no  -Over the past 2 weeks, have you felt little interest or pleasure in doing things? no    Health Habits   Please describe your diet habits: \"Its good, but I love chips! \"  Do you get 5 servings of fruits or vegetables daily? no  Do you exercise regularly? yes    Activities of Daily Living and Functional Status   -Do you need help with eating, walking, dressing, bathing, toileting, the phone, transportation, shopping, preparing meals, housework, laundry, medications or managing money? no  -In the past four weeks, was someone available to help you if you needed and wanted help with anything? yes  -Are you confident are you that you can control and manage most of your health problems? yes  -Have you been given information to help you keep track of your medications? yes  -How often do you have trouble taking your medications as prescribed? never    Fall Risk and Home Safety   Have you fallen 2 or more times in the past year? no  Does your home have rugs in the hallways? no, Do you have grab bars in the bathrooms?  no, Does your home have handrails on the stairs?  yes, Do you have adequate lighting in your home? yes  Do you have smoke detectors and check them regularly? yes  Do you have difficulties driving a car/vehicle? no  Do you always fasten your seat belt when you are in a car? yes        Functional Ability and Level of Safety    Hearing: Hearing is good. Activities of Daily Living: The home contains: handrails and grab bars  Patient does total self care      Ambulation: with no difficulty     Fall Risk:  Fall Risk Assessment, last 12 mths 11/5/2021   Able to walk? Yes   Fall in past 12 months? 0   Do you feel unsteady? 0   Are you worried about falling 0      Abuse Screen:  Patient is not abused       Cognitive Screening    Has your family/caregiver stated any concerns about your memory: no     Cognitive Screening: normal     Health Maintenance Due     Health Maintenance Due   Topic Date Due    Shingrix Vaccine Age 49> (1 of 2) Never done    Colorectal Cancer Screening Combo  10/01/2016    Foot Exam Q1  11/14/2020       Patient Care Team   Patient Care Team:  Baudilio Martinez DO as PCP - General (Family Medicine)    History     Patient Active Problem List   Diagnosis Code    [facility] R20.1    Essential hypertension I10    RAD (reactive airway disease) J45.909    Facial droop R29.810    S/P THAO-BSO Z90.710, Z90.722, Z90.79    H/O colonoscopy Z98.890    OA (osteoarthritis) M19.90    Gout M10.9    Obesity, Class III, BMI 40-49.9 (morbid obesity) ([facility] E66.01    Type II diabetes mellitus with complication, uncontrolled ([facility] [facility] E11.8, E11.65     Past Medical History:   Diagnosis Date    Asthma     Diabetes ([facility] [facility]     Hypertension       Past Surgical History:   Procedure Laterality Date    HX TOTAL ABDOMINAL HYSTERECTOMY  2007     Current Outpatient Medications   Medication Sig Dispense Refill    famotidine (PEPCID) 40 mg tablet Take 40 mg by mouth Daily (before breakfast).       cloNIDine HCL (CATAPRES) 0.2 mg tablet Take 0.2 mg by mouth two (2) times a day.  metFORMIN ER (GLUCOPHAGE XR) 750 mg tablet Take 1 Tablet by mouth two (2) times a day for 90 days. 180 Tablet 3    fluticasone propionate (FLONASE) 50 mcg/actuation nasal spray 2 Sprays by Both Nostrils route daily. 1 Each 3    chlorthalidone (HYGROTON) 25 mg tablet TAKE ONE TABLET BY MOUTH ONE TIME DAILY 30 Tablet 1    losartan (COZAAR) 100 mg tablet TAKE ONE TABLET BY MOUTH ONE TIME DAILY 90 Tablet 0    glimepiride (AMARYL) 4 mg tablet TAKE TWO TABLETS BY MOUTH EVERY MORNING 180 Tablet 1    albuterol (PROVENTIL HFA, VENTOLIN HFA, PROAIR HFA) 90 mcg/actuation inhaler INHALE TWO PUFFS BY MOUTH EVERY 4 HOURS AS NEEDED FOR WHEEZING 1 Inhaler 3    montelukast (SINGULAIR) 10 mg tablet Take 1 Tab by mouth daily. For asthma prevention 90 Tab 3    metoprolol tartrate (LOPRESSOR) 25 mg tablet TAKE ONE TABLET BY MOUTH TWICE A  Tab 1    simvastatin (ZOCOR) 40 mg tablet TAKE ONE TABLET BY MOUTH ONE TIME DAILY IN THE EVENING 90 Tab 1    glucose blood VI test strips (OneTouch Ultra Test) strip Test 4 times a day.  100 Strip 2    Blood-Glucose Meter monitoring kit Use prn for glucose checks at variety of times, fasting, 2hr pp, bedtime Diagnosis code: E 11.65 1 Kit 1     Allergies   Allergen Reactions    Codeine Nausea and Vomiting    Tramadol Nausea and Vomiting       Family History   Problem Relation Age of Onset    Diabetes Father     Diabetes Sister      Social History     Tobacco Use    Smoking status: Never Smoker    Smokeless tobacco: Never Used   Substance Use Topics    Alcohol use: No         Ricarda Morrison,

## 2021-11-05 NOTE — PROGRESS NOTES
Chief Complaint   Patient presents with   [address] [place] [address] Annual Wellness Visit     Patient here today for her medicare annual wellness     Visit Vitals  BP (!) 126/51 (BP 1 Location: Left arm, BP Patient Position: Sitting, BP Cuff Size: Adult long)   Pulse 68   Resp 16   Ht 5' 3\" (1.6 m)   Wt 227 lb (103 kg)   SpO2 99%   BMI 40.21 kg/m²       General Health Questions   -During the past 4 weeks:   -how would you rate your health in general? Good   -how often have you been bothered by feeling dizzy when standing up? never -how much have you been bothered by bodily pain? mildly   -Have you noticed any hearing difficulties? no   -has your physical and emotional health limited your social activities with family or friends? no    Emotional Health Questions   -Do you have a history of depression, anxiety, or emotional problems? no  -Over the past 2 weeks, have you felt down, depressed or hopeless? no  -Over the past 2 weeks, have you felt little interest or pleasure in doing things? no    Health Habits   Please describe your diet habits: \"Its good, but I love chips! \"  Do you get 5 servings of fruits or vegetables daily? no  Do you exercise regularly? yes    Activities of Daily Living and Functional Status   -Do you need help with eating, walking, dressing, bathing, toileting, the phone, transportation, shopping, preparing meals, housework, laundry, medications or managing money? no  -In the past four weeks, was someone available to help you if you needed and wanted help with anything? yes  -Are you confident are you that you can control and manage most of your health problems? yes  -Have you been given information to help you keep track of your medications? yes  -How often do you have trouble taking your medications as prescribed? never    Fall Risk and Home Safety   Have you fallen 2 or more times in the past year?  no  Does your home have rugs in the hallways? no, Do you have grab bars in the bathrooms?  no, Does your home have handrails on the stairs? yes, Do you have adequate lighting in your home? yes  Do you have smoke detectors and check them regularly?  yes  Do you have difficulties driving a car/vehicle? no  Do you always fasten your seat belt when you are in a car? yes

## 2021-11-22 RX ORDER — FAMOTIDINE 40 MG/1
TABLET, FILM COATED ORAL
Qty: 30 TABLET | Refills: 1 | Status: SHIPPED | OUTPATIENT
Start: 2021-11-22 | End: 2022-02-25

## 2021-12-20 DIAGNOSIS — I10 ESSENTIAL HYPERTENSION: ICD-10-CM

## 2021-12-20 DIAGNOSIS — E11.9 DIABETES MELLITUS WITHOUT COMPLICATION (HCC): ICD-10-CM

## 2021-12-20 RX ORDER — LOSARTAN POTASSIUM 100 MG/1
TABLET ORAL
Qty: 90 TABLET | Refills: 0 | Status: SHIPPED | OUTPATIENT
Start: 2021-12-20 | End: 2022-03-01 | Stop reason: SDUPTHER

## 2021-12-20 RX ORDER — METOPROLOL TARTRATE 25 MG/1
TABLET, FILM COATED ORAL
Qty: 180 TABLET | Refills: 1 | Status: SHIPPED | OUTPATIENT
Start: 2021-12-20 | End: 2022-08-08 | Stop reason: SDUPTHER

## 2021-12-20 RX ORDER — GLIMEPIRIDE 4 MG/1
TABLET ORAL
Qty: 180 TABLET | Refills: 1 | Status: SHIPPED | OUTPATIENT
Start: 2021-12-20 | End: 2022-10-11 | Stop reason: SDUPTHER

## 2022-01-31 ENCOUNTER — TELEPHONE (OUTPATIENT)
Dept: FAMILY MEDICINE CLINIC | Age: 67
End: 2022-01-31

## 2022-01-31 NOTE — TELEPHONE ENCOUNTER
Patient vm is full so wasn't able to leave vm. Called patient to set up an lab appointment only per Dr. Lizabeth Morel.

## 2022-02-16 DIAGNOSIS — I10 HYPERTENSION GOAL BP (BLOOD PRESSURE) < 140/90: ICD-10-CM

## 2022-02-18 RX ORDER — CHLORTHALIDONE 25 MG/1
TABLET ORAL
Qty: 90 TABLET | Refills: 1 | Status: SHIPPED | OUTPATIENT
Start: 2022-02-18 | End: 2022-05-19

## 2022-02-18 RX ORDER — AMLODIPINE BESYLATE 10 MG/1
TABLET ORAL
Qty: 90 TABLET | Refills: 1 | Status: SHIPPED | OUTPATIENT
Start: 2022-02-18 | End: 2022-05-19

## 2022-02-22 ENCOUNTER — LAB ONLY (OUTPATIENT)
Dept: FAMILY MEDICINE CLINIC | Age: 67
End: 2022-02-22

## 2022-02-22 LAB
EST. AVERAGE GLUCOSE BLD GHB EST-MCNC: 206 MG/DL
HBA1C MFR BLD: 8.8 % (ref 4–5.6)

## 2022-02-25 RX ORDER — FAMOTIDINE 40 MG/1
TABLET, FILM COATED ORAL
Qty: 30 TABLET | Refills: 1 | Status: SHIPPED | OUTPATIENT
Start: 2022-02-25 | End: 2022-04-27
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
## 2022-03-01 ENCOUNTER — OFFICE VISIT (OUTPATIENT)
Dept: FAMILY MEDICINE CLINIC | Age: 67
End: 2022-03-01
Payer: MEDICARE

## 2022-03-01 VITALS
RESPIRATION RATE: 16 BRPM | HEIGHT: 63 IN | BODY MASS INDEX: 40.93 KG/M2 | SYSTOLIC BLOOD PRESSURE: 137 MMHG | DIASTOLIC BLOOD PRESSURE: 60 MMHG | HEART RATE: 71 BPM | OXYGEN SATURATION: 99 % | WEIGHT: 231 LBS

## 2022-03-01 DIAGNOSIS — Z12.31 ENCOUNTER FOR SCREENING MAMMOGRAM FOR MALIGNANT NEOPLASM OF BREAST: ICD-10-CM

## 2022-03-01 DIAGNOSIS — E11.9 DIABETES MELLITUS WITHOUT COMPLICATION (HCC): ICD-10-CM

## 2022-03-01 DIAGNOSIS — I10 ESSENTIAL HYPERTENSION: Primary | ICD-10-CM

## 2022-03-01 DIAGNOSIS — J45.40 MODERATE PERSISTENT ASTHMA WITHOUT COMPLICATION: ICD-10-CM

## 2022-03-01 DIAGNOSIS — J30.89 ENVIRONMENTAL AND SEASONAL ALLERGIES: ICD-10-CM

## 2022-03-01 PROBLEM — E11.22 TYPE 2 DIABETES MELLITUS WITH CHRONIC KIDNEY DISEASE (HCC): Status: ACTIVE | Noted: 2022-03-01

## 2022-03-01 PROCEDURE — G8752 SYS BP LESS 140: HCPCS | Performed by: STUDENT IN AN ORGANIZED HEALTH CARE EDUCATION/TRAINING PROGRAM

## 2022-03-01 PROCEDURE — 3052F HG A1C>EQUAL 8.0%<EQUAL 9.0%: CPT | Performed by: STUDENT IN AN ORGANIZED HEALTH CARE EDUCATION/TRAINING PROGRAM

## 2022-03-01 PROCEDURE — 2022F DILAT RTA XM EVC RTNOPTHY: CPT | Performed by: STUDENT IN AN ORGANIZED HEALTH CARE EDUCATION/TRAINING PROGRAM

## 2022-03-01 PROCEDURE — 3017F COLORECTAL CA SCREEN DOC REV: CPT | Performed by: STUDENT IN AN ORGANIZED HEALTH CARE EDUCATION/TRAINING PROGRAM

## 2022-03-01 PROCEDURE — G8417 CALC BMI ABV UP PARAM F/U: HCPCS | Performed by: STUDENT IN AN ORGANIZED HEALTH CARE EDUCATION/TRAINING PROGRAM

## 2022-03-01 PROCEDURE — 99214 OFFICE O/P EST MOD 30 MIN: CPT | Performed by: STUDENT IN AN ORGANIZED HEALTH CARE EDUCATION/TRAINING PROGRAM

## 2022-03-01 PROCEDURE — G8427 DOCREV CUR MEDS BY ELIG CLIN: HCPCS | Performed by: STUDENT IN AN ORGANIZED HEALTH CARE EDUCATION/TRAINING PROGRAM

## 2022-03-01 PROCEDURE — 1090F PRES/ABSN URINE INCON ASSESS: CPT | Performed by: STUDENT IN AN ORGANIZED HEALTH CARE EDUCATION/TRAINING PROGRAM

## 2022-03-01 PROCEDURE — G8432 DEP SCR NOT DOC, RNG: HCPCS | Performed by: STUDENT IN AN ORGANIZED HEALTH CARE EDUCATION/TRAINING PROGRAM

## 2022-03-01 PROCEDURE — 1101F PT FALLS ASSESS-DOCD LE1/YR: CPT | Performed by: STUDENT IN AN ORGANIZED HEALTH CARE EDUCATION/TRAINING PROGRAM

## 2022-03-01 PROCEDURE — G8754 DIAS BP LESS 90: HCPCS | Performed by: STUDENT IN AN ORGANIZED HEALTH CARE EDUCATION/TRAINING PROGRAM

## 2022-03-01 PROCEDURE — G9899 SCRN MAM PERF RSLTS DOC: HCPCS | Performed by: STUDENT IN AN ORGANIZED HEALTH CARE EDUCATION/TRAINING PROGRAM

## 2022-03-01 PROCEDURE — G0463 HOSPITAL OUTPT CLINIC VISIT: HCPCS | Performed by: STUDENT IN AN ORGANIZED HEALTH CARE EDUCATION/TRAINING PROGRAM

## 2022-03-01 PROCEDURE — G8399 PT W/DXA RESULTS DOCUMENT: HCPCS | Performed by: STUDENT IN AN ORGANIZED HEALTH CARE EDUCATION/TRAINING PROGRAM

## 2022-03-01 PROCEDURE — G8536 NO DOC ELDER MAL SCRN: HCPCS | Performed by: STUDENT IN AN ORGANIZED HEALTH CARE EDUCATION/TRAINING PROGRAM

## 2022-03-01 RX ORDER — INSULIN PUMP SYRINGE, 3 ML
EACH MISCELLANEOUS
Qty: 1 KIT | Refills: 1 | Status: SHIPPED | OUTPATIENT
Start: 2022-03-01 | End: 2022-03-11 | Stop reason: SDUPTHER

## 2022-03-01 RX ORDER — PIOGLITAZONEHYDROCHLORIDE 15 MG/1
15 TABLET ORAL DAILY
Qty: 90 TABLET | Refills: 1 | Status: SHIPPED | OUTPATIENT
Start: 2022-03-01 | End: 2022-07-31

## 2022-03-01 RX ORDER — OXYCODONE HYDROCHLORIDE 5 MG/1
TABLET ORAL
COMMUNITY
Start: 2022-01-17 | End: 2022-07-29

## 2022-03-01 RX ORDER — PIOGLITAZONEHYDROCHLORIDE 15 MG/1
15 TABLET ORAL DAILY
Qty: 90 TABLET | Refills: 3 | Status: CANCELLED | OUTPATIENT
Start: 2022-03-01

## 2022-03-01 RX ORDER — LOSARTAN POTASSIUM 100 MG/1
100 TABLET ORAL DAILY
Qty: 90 TABLET | Refills: 3 | Status: SHIPPED | OUTPATIENT
Start: 2022-03-01

## 2022-03-01 RX ORDER — FLUTICASONE PROPIONATE 50 MCG
2 SPRAY, SUSPENSION (ML) NASAL DAILY
Qty: 1 EACH | Refills: 3 | Status: SHIPPED | OUTPATIENT
Start: 2022-03-01

## 2022-03-01 RX ORDER — LANCETS
EACH MISCELLANEOUS
Qty: 100 EACH | Refills: 11 | Status: SHIPPED | OUTPATIENT
Start: 2022-03-01 | End: 2022-07-29

## 2022-03-01 RX ORDER — ALBUTEROL SULFATE 90 UG/1
AEROSOL, METERED RESPIRATORY (INHALATION)
Qty: 1 EACH | Refills: 3 | Status: SHIPPED | OUTPATIENT
Start: 2022-03-01

## 2022-03-01 RX ORDER — BLOOD SUGAR DIAGNOSTIC
STRIP MISCELLANEOUS
Qty: 100 STRIP | Refills: 2 | Status: SHIPPED | OUTPATIENT
Start: 2022-03-01 | End: 2022-03-11 | Stop reason: SDUPTHER

## 2022-03-01 NOTE — ASSESSMENT & PLAN NOTE
A1c not at goal of 8.8.  maxed on Metformin XR BID and Glimepiride 4mg BID   Does not have Medicare part D so meds are limited   Does not want to start insulin   Will trial Actos again- start low dose   Continue current ment   Eye exam this year   F/u 3 months - A1c check

## 2022-03-01 NOTE — PATIENT INSTRUCTIONS
Pioglitazone (By mouth)   Pioglitazone ([id number]-[id number]-[id number]-zone)  Treats type 2 diabetes. Brand Name(s): Actos   There may be other brand names for this medicine. When This Medicine Should Not Be Used: This medicine is not right for everyone. Do not use it if you had an allergic reaction to pioglitazone. How to Use This Medicine:   Tablet  · Take your medicine as directed. Your dose may need to be changed several times to find what works best for you. · This medicine should come with a Medication Guide. Ask your pharmacist for a copy if you do not have one. · Missed dose: Take a dose as soon as you remember. If it is almost time for your next dose, wait until then and take a regular dose. Do not take extra medicine to make up for a missed dose. · Store the medicine in a closed container at room temperature, away from heat, moisture, and direct light. Drugs and Foods to Avoid:   Ask your doctor or pharmacist before using any other medicine, including over-the-counter medicines, vitamins, and herbal products. · Some medicines can affect how pioglitazone works. Tell your doctor if you are using gemfibrozil or rifampin. Warnings While Using This Medicine:   · Tell your doctor if you are pregnant or breastfeeding. If you had problems ovulating or have irregular periods, this medicine may cause you to ovulate, which could lead to pregnancy if you are sexually active. Talk to your doctor about effective birth control while you are using this medicine. · Tell your doctor if you have kidney disease, liver disease, heart disease, heart failure, macular edema (swelling of the back of the eye), or a history of bladder cancer.   · This medicine may cause the following problems:  ¨ Congestive heart failure  ¨ Low blood sugar levels  ¨ Liver problems  ¨ Increased risk of broken bones, especially in women  ¨ Increased risk of bladder cancer, if you use this medicine for a long time  · Your doctor will do lab tests at regular visits to check on the effects of this medicine. Keep all appointments. · Keep all medicine out of the reach of children. Never share your medicine with anyone. Possible Side Effects While Using This Medicine:   Call your doctor right away if you notice any of these side effects:  · Allergic reaction: Itching or hives, swelling in your face or hands, swelling or tingling in your mouth or throat, chest tightness, trouble breathing  · Blurred vision or changes in vision  · Change in how often or how much you urinate, painful urination, lower back or side pain, blood in your urine  · Dark urine or pale stools, nausea, vomiting, loss of appetite, stomach pain, yellow skin or eyes  · Rapid weight gain, swelling in your hands, ankles, or feet, trouble breathing  · Severe joint pain  · Shaking, trembling, sweating, fast or pounding heartbeat, faintness, hunger, confusion  If you notice these less serious side effects, talk with your doctor:   · Bloated or full feeling, gas  · Cough, runny or stuffy nose, sneezing, or sore throat  · Headache  If you notice other side effects that you think are caused by this medicine, tell your doctor. Call your doctor for medical advice about side effects. You may report side effects to FDA at 3-948-FDA-7163  © 2017 [facility] Information is for End User's use only and may not be sold, redistributed or otherwise used for commercial purposes. The above information is an  only. It is not intended as medical advice for individual conditions or treatments. Talk to your doctor, nurse or pharmacist before following any medical regimen to see if it is safe and effective for you. Nutrition Tips for Diabetes: After Your Visit  Your Care Instructions  A healthy diet is important to manage diabetes. It helps you lose weight (if you need to) and keep it off. It gives you the nutrition and energy your body needs and helps prevent heart disease.  But a diet for diabetes does not mean that you have to eat special foods. You can eat what your family eats, including occasional sweets and other favorites. But you do have to pay attention to how often you eat and how much you eat of certain foods. The right plan for you will give you meals that help you keep your blood sugar at healthy levels. Try to eat a variety of foods and to spread carbohydrate throughout the day. Carbohydrate raises blood sugar higher and more quickly than any other nutrient does. Carbohydrate is found in sugar, breads and cereals, fruit, starchy vegetables such as potatoes and corn, and milk and yogurt. You may want to work with a dietitian or diabetes educator to help you plan meals and snacks. A dietitian or diabetes educator also can help you lose weight if that is one of your goals. The following tips can help you enjoy your meals and stay healthy. Follow-up care is a key part of your treatment and safety. Be sure to make and go to all appointments, and call your doctor if you are having problems. Its also a good idea to know your test results and keep a list of the medicines you take. How can you care for yourself at home? · Learn which foods have carbohydrate and how much carbohydrate to eat. A dietitian or diabetes educator can help you learn to keep track of how much carbohydrate you eat. · Spread carbohydrate throughout the day. Eat some carbohydrate at all meals, but do not eat too much at any one time. · Plan meals to include food from all the food groups. These are the food groups and some example portion sizes:  ¨ Grains: 1 slice of bread (1 ounce), ½ cup of cooked cereal, and 1/3 cup of cooked pasta or rice. These have about 15 grams of carbohydrate in a serving. Choose whole grains such as whole wheat bread or crackers, oatmeal, and brown rice more often than refined grains.   ¨ Fruit: 1 small fresh fruit, such as an apple or orange; ½ of a banana; ½ cup of chopped, cooked, or canned fruit; ½ cup of fruit juice; 1 cup of melon or raspberries; and 2 tablespoons of dried fruit. These have about 15 grams of carbohydrate in a serving. ¨ Dairy: 1 cup of nonfat or low-fat milk and 2/3 cup of plain yogurt. These have about 15 grams of carbohydrate in a serving. ¨ Protein foods: Beef, chicken, turkey, fish, eggs, tofu, cheese, cottage cheese, and peanut butter. A serving size of meat is 3 ounces, which is about the size of a deck of cards. Examples of meat substitute serving sizes (equal to 1 ounce of meat) are 1/4 cup of cottage cheese, 1 egg, 1 tablespoon of peanut butter, and ½ cup of tofu. These have very little or no carbohydrate per serving. ¨ Vegetables: Starchy vegetables such as ½ cup of cooked dried beans, peas, potatoes, or corn have about 15 grams of carbohydrate. Nonstarchy vegetables have very little carbohydrate, such as 1 cup of raw leafy vegetables (such as spinach), ½ cup of other vegetables (cooked or chopped), and 3/4 cup of vegetable juice. · Use the plate format to plan meals. It is a good, quick way to make sure that you have a balanced meal. It also helps you spread carbohydrate throughout the day. You divide your plate by types of foods. Put vegetables on half the plate, meat or meat substitutes on one-quarter of the plate, and a grain or starchy vegetable (such as brown rice or a potato) in the final quarter of the plate. To this you can add a small piece of fruit and 1 cup of milk or yogurt, depending on how much carbohydrate you are supposed to eat at a meal.  · Talk to your dietitian or diabetes educator about ways to add limited amounts of sweets into your meal plan. You can eat these foods now and then, as long as you include the amount of carbohydrate they have in your daily carbohydrate allowance. · If you drink alcohol, limit it to no more than 1 drink a day for women and 2 drinks a day for men.  If you are pregnant, no amount of alcohol is known to be safe.  · Protein, fat, and fiber do not raise blood sugar as much as carbohydrate does. If you eat a lot of these nutrients in a meal, your blood sugar will rise more slowly than it would otherwise. · Limit saturated fats, such as those from meat and dairy products. Try to replace it with monounsaturated fat, such as olive oil. This is a healthier choice because people who have diabetes are at higher-than-average risk of heart disease. But use a modest amount of olive oil. A tablespoon of olive oil has 14 grams of fat and 120 calories. · Exercise lowers blood sugar. If you take insulin by shots or pump, you can use less than you would if you were not exercising. Keep in mind that timing matters. If you exercise within 1 hour after a meal, your body may need less insulin for that meal than it would if you exercised 3 hours after the meal. Test your blood sugar to find out how exercise affects your need for insulin. · Exercise on most days of the week. Aim for at least 30 minutes. Exercise helps you stay at a healthy weight and helps your body use insulin. Walking is an easy way to get exercise. Gradually increase the amount you walk every day. You also may want to swim, bike, or do other activities. When you eat out  · Learn to estimate the serving sizes of foods that have carbohydrate. If you measure food at home, it will be easier to estimate the amount in a serving of restaurant food. · If the meal you order has too much carbohydrate (such as potatoes, corn, or baked beans), ask to have a low-carbohydrate food instead. Ask for a salad or green vegetables. · If you use insulin, check your blood sugar before and after eating out to help you plan how much to eat in the future. · If you eat more carbohydrate at a meal than you had planned, take a walk or do other exercise. This will help lower your blood sugar. Where can you learn more?    Go to [employer]  Enter K164 in the search box to learn more about \"Nutrition Tips for Diabetes: After Your Visit. \"   © 9644-0870 Healthwise, Incorporated. Care instructions adapted under license by [facility] (which disclaims liability or warranty for this information). This care instruction is for use with your licensed healthcare professional. If you have questions about a medical condition or this instruction, always ask your healthcare professional. [address] any warranty or liability for your use of this information.   Content Version: 74.3.648362; Current as of: June 4, 2014
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
## 2022-03-03 NOTE — TELEPHONE ENCOUNTER
Hi Good Afternoon,  [facility] called wanted to update about pt lancets and test strips that Pt insurance cant be able to cover due to Medicare PART B.   Please advice      -Arsenio Maravilla

## 2022-03-07 NOTE — TELEPHONE ENCOUNTER
Received: 3 days ago  DO Rosa Mesa, April M, LPN  Caller: Unspecified (2 weeks ago)  Are you able to call the pharmacy and see what lancets and test strips this patient can get for cheap or that can be covered under her insurance   Thank you so much     KG       Called and spoke to pharmacist.  He states the problem is that the patient's insurance does not allow the Mayank's NPI or BELEN #. It needs to be called in by her supervising provider.

## 2022-03-11 DIAGNOSIS — E11.9 DIABETES MELLITUS WITHOUT COMPLICATION (HCC): ICD-10-CM

## 2022-03-11 RX ORDER — INSULIN PUMP SYRINGE, 3 ML
EACH MISCELLANEOUS
Qty: 1 KIT | Refills: 1 | Status: SHIPPED | OUTPATIENT
Start: 2022-03-11 | End: 2022-07-29

## 2022-03-11 RX ORDER — BLOOD SUGAR DIAGNOSTIC
STRIP MISCELLANEOUS
Qty: 100 STRIP | Refills: 2 | Status: SHIPPED | OUTPATIENT
Start: 2022-03-11 | End: 2022-07-29

## 2022-03-11 NOTE — TELEPHONE ENCOUNTER
Pharmacy called and stated the insurance will only cover the prescriptions if they are re-written by a attending physician   please advise. Thank you.

## 2022-03-18 PROBLEM — E66.01 OBESITY, CLASS III, BMI 40-49.9 (MORBID OBESITY) (HCC): Status: ACTIVE | Noted: 2018-05-01

## 2022-03-18 PROBLEM — J30.89 ENVIRONMENTAL AND SEASONAL ALLERGIES: Status: ACTIVE | Noted: 2022-03-01

## 2022-03-18 PROBLEM — E66.813 OBESITY, CLASS III, BMI 40-49.9 (MORBID OBESITY): Status: ACTIVE | Noted: 2018-05-01

## 2022-03-19 PROBLEM — J45.40 MODERATE PERSISTENT ASTHMA WITHOUT COMPLICATION: Status: ACTIVE | Noted: 2022-03-01

## 2022-03-20 PROBLEM — E11.22 TYPE 2 DIABETES MELLITUS WITH CHRONIC KIDNEY DISEASE (HCC): Status: ACTIVE | Noted: 2022-03-01

## 2022-04-27 RX ORDER — FAMOTIDINE 40 MG/1
TABLET, FILM COATED ORAL
Qty: 30 TABLET | Refills: 1 | Status: SHIPPED | OUTPATIENT
Start: 2022-04-27

## 2022-05-14 DIAGNOSIS — J45.40 MODERATE PERSISTENT ASTHMA WITHOUT COMPLICATION: ICD-10-CM

## 2022-05-15 RX ORDER — MONTELUKAST SODIUM 10 MG/1
TABLET ORAL
Qty: 90 TABLET | Refills: 3 | Status: SHIPPED | OUTPATIENT
Start: 2022-05-15

## 2022-06-07 RX ORDER — CLONIDINE HYDROCHLORIDE 0.2 MG/1
TABLET ORAL
Qty: 180 TABLET | Refills: 1 | Status: SHIPPED | OUTPATIENT
Start: 2022-06-07

## 2022-07-18 DIAGNOSIS — E11.9 DIABETES MELLITUS WITHOUT COMPLICATION (HCC): ICD-10-CM

## 2022-07-18 RX ORDER — SIMVASTATIN 40 MG/1
40 TABLET, FILM COATED ORAL
Qty: 90 TABLET | Refills: 3 | Status: SHIPPED | OUTPATIENT
Start: 2022-07-18

## 2022-07-29 ENCOUNTER — OFFICE VISIT (OUTPATIENT)
Dept: FAMILY MEDICINE CLINIC | Age: 67
End: 2022-07-29
Payer: MEDICARE

## 2022-07-29 VITALS
BODY MASS INDEX: 39.27 KG/M2 | RESPIRATION RATE: 14 BRPM | TEMPERATURE: 98.5 F | OXYGEN SATURATION: 100 % | WEIGHT: 221.6 LBS | SYSTOLIC BLOOD PRESSURE: 129 MMHG | DIASTOLIC BLOOD PRESSURE: 60 MMHG | HEIGHT: 63 IN | HEART RATE: 64 BPM

## 2022-07-29 DIAGNOSIS — E11.22 TYPE 2 DIABETES MELLITUS WITH STAGE 3A CHRONIC KIDNEY DISEASE, WITHOUT LONG-TERM CURRENT USE OF INSULIN (HCC): ICD-10-CM

## 2022-07-29 DIAGNOSIS — K90.9 DIARRHEA DUE TO MALABSORPTION: ICD-10-CM

## 2022-07-29 DIAGNOSIS — E11.22 TYPE 2 DIABETES MELLITUS WITH STAGE 3A CHRONIC KIDNEY DISEASE, WITHOUT LONG-TERM CURRENT USE OF INSULIN (HCC): Primary | ICD-10-CM

## 2022-07-29 DIAGNOSIS — N18.31 TYPE 2 DIABETES MELLITUS WITH STAGE 3A CHRONIC KIDNEY DISEASE, WITHOUT LONG-TERM CURRENT USE OF INSULIN (HCC): ICD-10-CM

## 2022-07-29 DIAGNOSIS — R19.7 DIARRHEA DUE TO MALABSORPTION: ICD-10-CM

## 2022-07-29 DIAGNOSIS — N18.31 TYPE 2 DIABETES MELLITUS WITH STAGE 3A CHRONIC KIDNEY DISEASE, WITHOUT LONG-TERM CURRENT USE OF INSULIN (HCC): Primary | ICD-10-CM

## 2022-07-29 PROBLEM — N18.30 CHRONIC RENAL DISEASE, STAGE III (HCC): Status: ACTIVE | Noted: 2022-07-29

## 2022-07-29 LAB
EST. AVERAGE GLUCOSE BLD GHB EST-MCNC: 183 MG/DL
HBA1C MFR BLD: 8 % (ref 4–5.6)

## 2022-07-29 PROCEDURE — G8417 CALC BMI ABV UP PARAM F/U: HCPCS | Performed by: STUDENT IN AN ORGANIZED HEALTH CARE EDUCATION/TRAINING PROGRAM

## 2022-07-29 PROCEDURE — 2022F DILAT RTA XM EVC RTNOPTHY: CPT | Performed by: STUDENT IN AN ORGANIZED HEALTH CARE EDUCATION/TRAINING PROGRAM

## 2022-07-29 PROCEDURE — G8510 SCR DEP NEG, NO PLAN REQD: HCPCS | Performed by: STUDENT IN AN ORGANIZED HEALTH CARE EDUCATION/TRAINING PROGRAM

## 2022-07-29 PROCEDURE — 1123F ACP DISCUSS/DSCN MKR DOCD: CPT | Performed by: STUDENT IN AN ORGANIZED HEALTH CARE EDUCATION/TRAINING PROGRAM

## 2022-07-29 PROCEDURE — 1101F PT FALLS ASSESS-DOCD LE1/YR: CPT | Performed by: STUDENT IN AN ORGANIZED HEALTH CARE EDUCATION/TRAINING PROGRAM

## 2022-07-29 PROCEDURE — G9899 SCRN MAM PERF RSLTS DOC: HCPCS | Performed by: STUDENT IN AN ORGANIZED HEALTH CARE EDUCATION/TRAINING PROGRAM

## 2022-07-29 PROCEDURE — G8752 SYS BP LESS 140: HCPCS | Performed by: STUDENT IN AN ORGANIZED HEALTH CARE EDUCATION/TRAINING PROGRAM

## 2022-07-29 PROCEDURE — G8536 NO DOC ELDER MAL SCRN: HCPCS | Performed by: STUDENT IN AN ORGANIZED HEALTH CARE EDUCATION/TRAINING PROGRAM

## 2022-07-29 PROCEDURE — G8427 DOCREV CUR MEDS BY ELIG CLIN: HCPCS | Performed by: STUDENT IN AN ORGANIZED HEALTH CARE EDUCATION/TRAINING PROGRAM

## 2022-07-29 PROCEDURE — G8399 PT W/DXA RESULTS DOCUMENT: HCPCS | Performed by: STUDENT IN AN ORGANIZED HEALTH CARE EDUCATION/TRAINING PROGRAM

## 2022-07-29 PROCEDURE — 99214 OFFICE O/P EST MOD 30 MIN: CPT | Performed by: STUDENT IN AN ORGANIZED HEALTH CARE EDUCATION/TRAINING PROGRAM

## 2022-07-29 PROCEDURE — 3017F COLORECTAL CA SCREEN DOC REV: CPT | Performed by: STUDENT IN AN ORGANIZED HEALTH CARE EDUCATION/TRAINING PROGRAM

## 2022-07-29 PROCEDURE — 1090F PRES/ABSN URINE INCON ASSESS: CPT | Performed by: STUDENT IN AN ORGANIZED HEALTH CARE EDUCATION/TRAINING PROGRAM

## 2022-07-29 PROCEDURE — G0463 HOSPITAL OUTPT CLINIC VISIT: HCPCS | Performed by: STUDENT IN AN ORGANIZED HEALTH CARE EDUCATION/TRAINING PROGRAM

## 2022-07-29 PROCEDURE — G8754 DIAS BP LESS 90: HCPCS | Performed by: STUDENT IN AN ORGANIZED HEALTH CARE EDUCATION/TRAINING PROGRAM

## 2022-07-29 RX ORDER — AMLODIPINE BESYLATE 10 MG/1
10 TABLET ORAL DAILY
Qty: 90 TABLET | Refills: 3 | Status: SHIPPED | OUTPATIENT
Start: 2022-07-29

## 2022-07-29 RX ORDER — CHOLESTYRAMINE 4 G/4.8G
2 POWDER, FOR SUSPENSION ORAL 2 TIMES DAILY
Qty: 60 EACH | Refills: 2 | Status: SHIPPED | OUTPATIENT
Start: 2022-07-29

## 2022-07-29 NOTE — PROGRESS NOTES
Leslie Arzola is a 79 y.o. female    Chief Complaint   Patient presents with    Follow-up     Patient here to follow up on DM       1. Have you been to the ER, urgent care clinic since your last visit? Hospitalized since your last visit? No  2. Have you seen or consulted any other health care providers outside of the [address] [place] [address] since your last visit? Include any pap smears or colon screening.  No    Visit Vitals  /60 (BP 1 Location: Right arm, BP Patient Position: Sitting, BP Cuff Size: Adult)   Pulse 64   Temp 98.5 °F (36.9 °C)   Resp 14   Ht 5' 3\" (1.6 m)   Wt 221 lb 9.6 oz (100.5 kg)   SpO2 100%   BMI 39.25 kg/m²     3 most recent PHQ Screens 7/29/2022   Little interest or pleasure in doing things Not at all   Feeling down, depressed, irritable, or hopeless Not at all   Total Score PHQ 2 0     Health Maintenance Due   Topic Date Due    Shingrix Vaccine Age 49> (1 of 2) Never done    Colorectal Cancer Screening Combo  10/01/2016    COVID-19 Vaccine (3 - Booster for Moderna series) 09/14/2021    Breast Cancer Screen Mammogram  07/01/2022

## 2022-07-29 NOTE — TELEPHONE ENCOUNTER
----- Message from Stephanie Baez sent at 7/27/2022  5:00 PM EDT -----  Subject: Refill Request    QUESTIONS  Name of Medication? amLODIPine (NORVASC) 10 mg tablet  Patient-reported dosage and instructions? 1 a day  How many days do you have left? 8  Preferred Pharmacy? [facility] phone number (if available)? [phone]  Additional Information for Provider? Link Garcia needs refills on her   amlodipine, she will also need an appointment for her DM follow up. She   can be reached at [phone] ok to leave a message  ---------------------------------------------------------------------------  --------------  [address]  What is the best way for the office to contact you? OK to leave message on   voicemail  Preferred Call Back Phone Number? [phone]  ---------------------------------------------------------------------------  --------------  SCRIPT ANSWERS  Relationship to Patient?  Self

## 2022-07-31 RX ORDER — METFORMIN HYDROCHLORIDE 750 MG/1
750 TABLET, EXTENDED RELEASE ORAL 2 TIMES DAILY
COMMUNITY

## 2022-07-31 NOTE — PROGRESS NOTES
[address] [facility] [address] [place] [address]   Office [phone], Fax [phone]      Chief Complaint:   Alejandrina Goodman is a 79 y.o. female that presents for:     Chief Complaint   Patient presents with    Follow-up     Patient here to follow up on DM     Assessment/Plan:   Diagnoses and all orders for this visit:    1. Type 2 diabetes mellitus with stage 3a chronic kidney disease, without long-term current use of insulin ([facility] last HbA1c (2/2022) 8.8, was prescribed Actos but was unable to  from pharmacy due to cost and the insurance would not cover it for her  - Obtain HbA1c  - Continue current regimen of Metformin ER 750mg BID and Amaryl 8mg daily, will adjust based on HbA1c as needed    2. Diarrhea due to malabsorption: started after having gallbladder removed  - Will trial on Cholestyramine     Follow up: Follow-up and Dispositions    Return in about 4 months (around 11/29/2022) for Medicare Wellness. Subjective:   HPI:  Alejandrina Goodman is a 79 y.o. female who presents to clinic for follow up of T2DM and for evaluation of Diarrhea. T2DM: currently on Metformin ER 750mg BID and Amaryl 8mg daily, was previously prescribed Actos but was unable to start the medication due to the cost and it was not covered by her insurance. Diarrhea: has been having diarrhea ever since having her gallbladder removed. She says that whatever she eats she feels like she is running to the bathroom right away because it runs through her. Health Maintenance:  Health Maintenance Due   Topic Date Due    Shingrix Vaccine Age 49> (1 of 2) Never done    Colorectal Cancer Screening Combo  10/01/2016    COVID-19 Vaccine (3 - Booster for Shabnam Inderjit series) 09/14/2021    Breast Cancer Screen Mammogram  07/01/2022      ROS:   Review of Systems   All other systems reviewed and are negative. Past medical history, social history, and medications personally reviewed.   Past Medical History:   Diagnosis Date    Asthma Diabetes ([facility]     Hypertension       Allergies personally reviewed. Allergies   Allergen Reactions    Codeine Nausea and Vomiting    Tramadol Nausea and Vomiting      Objective:   Vitals reviewed. Visit Vitals  /60 (BP 1 Location: Right arm, BP Patient Position: Sitting, BP Cuff Size: Adult)   Pulse 64   Temp 98.5 °F (36.9 °C)   Resp 14   Ht 5' 3\" (1.6 m)   Wt 221 lb 9.6 oz (100.5 kg)   LMP 09/02/2007   SpO2 100%   BMI 39.25 kg/m²      Physical Exam  Vitals Reviewed. General AO x 3. No distress. Not diaphoretic. No jaundice. No cyanosis. No pallor. Neck No thyromegaly present. No JVD. No cervical adenopathy. Cardio Normal rate, regular rhythm. No murmur, rubs, or gallop. Pulmonary Effort normal. No accessory muscle use. No wheezes, rales, or rhonchi. Abdominal Soft. Bowel sounds normal. No tenderness. No distension. Extremities No edema of lower extremities. Pulses 2+. Neurological CN II-XII grossly intact. No focal deficits. Skin No rash. Pt was discussed with Dr. Addy Hope (attending physician). I have reviewed pertinent labs results and other data. I have discussed the diagnosis with the patient and the intended plan as seen in the above orders. The patient has received an after-visit summary and questions were answered concerning future plans. I have discussed medication side effects and warnings with the patient as well.     Mane Campos MD  Resident [facility]  07/29/22

## 2022-08-01 NOTE — PROGRESS NOTES
[address] Medicine Residency Attending Addendum:  I saw and evaluated the patient on the day of the encounter with Chin Thapa MD  , performing the key elements of the service. I discussed the findings, assessment and plan with the resident and agree with the resident's findings and plan as documented in the resident's note.       Elda Doan MD, CAQSM, RMSK

## 2022-08-12 RX ORDER — METOPROLOL TARTRATE 25 MG/1
25 TABLET, FILM COATED ORAL 2 TIMES DAILY
Qty: 180 TABLET | Refills: 1 | Status: SHIPPED | OUTPATIENT
Start: 2022-08-12

## 2022-10-11 DIAGNOSIS — E11.9 DIABETES MELLITUS WITHOUT COMPLICATION (HCC): ICD-10-CM

## 2022-10-11 RX ORDER — GLIMEPIRIDE 4 MG/1
TABLET ORAL
Qty: 180 TABLET | Refills: 3 | Status: SHIPPED | OUTPATIENT
Start: 2022-10-11

## 2022-11-07 RX ORDER — FAMOTIDINE 40 MG/1
TABLET, FILM COATED ORAL
Qty: 30 TABLET | Refills: 1 | Status: SHIPPED | OUTPATIENT
Start: 2022-11-07

## 2022-12-12 RX ORDER — CLONIDINE HYDROCHLORIDE 0.2 MG/1
TABLET ORAL
Qty: 180 TABLET | Refills: 1 | Status: SHIPPED | OUTPATIENT
Start: 2022-12-12

## 2022-12-12 RX ORDER — METFORMIN HYDROCHLORIDE 750 MG/1
TABLET, EXTENDED RELEASE ORAL
Qty: 180 TABLET | Refills: 3 | Status: SHIPPED | OUTPATIENT
Start: 2022-12-12

## 2022-12-21 ENCOUNTER — OFFICE VISIT (OUTPATIENT)
Dept: FAMILY MEDICINE CLINIC | Age: 67
End: 2022-12-21
Payer: MEDICARE

## 2022-12-21 VITALS
RESPIRATION RATE: 16 BRPM | SYSTOLIC BLOOD PRESSURE: 117 MMHG | TEMPERATURE: 97.9 F | DIASTOLIC BLOOD PRESSURE: 71 MMHG | WEIGHT: 217 LBS | HEIGHT: 63 IN | OXYGEN SATURATION: 98 % | BODY MASS INDEX: 38.45 KG/M2 | HEART RATE: 74 BPM

## 2022-12-21 DIAGNOSIS — U07.1 COVID-19 VIRUS INFECTION: ICD-10-CM

## 2022-12-21 DIAGNOSIS — J45.31 MILD PERSISTENT ASTHMA WITH ACUTE EXACERBATION: Primary | ICD-10-CM

## 2022-12-21 DIAGNOSIS — I10 PRIMARY HYPERTENSION: ICD-10-CM

## 2022-12-21 PROCEDURE — 1090F PRES/ABSN URINE INCON ASSESS: CPT

## 2022-12-21 PROCEDURE — G0463 HOSPITAL OUTPT CLINIC VISIT: HCPCS

## 2022-12-21 PROCEDURE — G8417 CALC BMI ABV UP PARAM F/U: HCPCS

## 2022-12-21 PROCEDURE — 1101F PT FALLS ASSESS-DOCD LE1/YR: CPT

## 2022-12-21 PROCEDURE — G8536 NO DOC ELDER MAL SCRN: HCPCS

## 2022-12-21 PROCEDURE — G9899 SCRN MAM PERF RSLTS DOC: HCPCS

## 2022-12-21 PROCEDURE — G8399 PT W/DXA RESULTS DOCUMENT: HCPCS

## 2022-12-21 PROCEDURE — 3078F DIAST BP <80 MM HG: CPT

## 2022-12-21 PROCEDURE — 1123F ACP DISCUSS/DSCN MKR DOCD: CPT

## 2022-12-21 PROCEDURE — 3017F COLORECTAL CA SCREEN DOC REV: CPT

## 2022-12-21 PROCEDURE — G8432 DEP SCR NOT DOC, RNG: HCPCS

## 2022-12-21 PROCEDURE — 3074F SYST BP LT 130 MM HG: CPT

## 2022-12-21 PROCEDURE — 99214 OFFICE O/P EST MOD 30 MIN: CPT

## 2022-12-21 PROCEDURE — G8427 DOCREV CUR MEDS BY ELIG CLIN: HCPCS

## 2022-12-21 RX ORDER — CLONIDINE HYDROCHLORIDE 0.2 MG/1
0.2 TABLET ORAL 2 TIMES DAILY
Qty: 60 TABLET | Refills: 1 | Status: SHIPPED | OUTPATIENT
Start: 2022-12-21 | End: 2023-02-19

## 2022-12-21 RX ORDER — BUDESONIDE AND FORMOTEROL FUMARATE DIHYDRATE 80; 4.5 UG/1; UG/1
2 AEROSOL RESPIRATORY (INHALATION) 2 TIMES DAILY
Qty: 10.2 G | Refills: 2 | Status: SHIPPED | OUTPATIENT
Start: 2022-12-21

## 2022-12-21 RX ORDER — ALBUTEROL SULFATE 0.83 MG/ML
2.5 SOLUTION RESPIRATORY (INHALATION) AS NEEDED
Qty: 6 ML | Refills: 1 | Status: SHIPPED | OUTPATIENT
Start: 2022-12-21

## 2022-12-21 RX ORDER — NEBULIZER AND COMPRESSOR
1 EACH MISCELLANEOUS AS NEEDED
Qty: 1 EACH | Refills: 0 | Status: SHIPPED | OUTPATIENT
Start: 2022-12-21

## 2022-12-21 RX ORDER — NEBULIZER AND COMPRESSOR
1 EACH MISCELLANEOUS AS NEEDED
Qty: 1 EACH | Refills: 0 | Status: SHIPPED | OUTPATIENT
Start: 2022-12-21 | End: 2022-12-21

## 2022-12-21 RX ORDER — CHLORTHALIDONE 25 MG/1
25 TABLET ORAL DAILY
Qty: 30 TABLET | Refills: 1 | Status: SHIPPED | OUTPATIENT
Start: 2022-12-21

## 2022-12-21 NOTE — PROGRESS NOTES
Susy Michel is a 79 y.o. female    Chief Complaint   Patient presents with    Hospital Follow Up     Patient is coming in for a hospital follow up. She went to [facility] ER on 12/04/2022. She was diagnosed with COVID. No other concerns. 1. Have you been to the ER, urgent care clinic since your last visit? Hospitalized since your last visit? No    2. Have you seen or consulted any other health care providers outside of the [address] [place] [address] since your last visit? Include any pap smears or colon screening. No      Visit Vitals  /71 (BP 1 Location: Right upper arm, BP Patient Position: Sitting)   Pulse 74   Temp 97.9 °F (36.6 °C) (Oral)   Resp 16   Ht 5' 3\" (1.6 m)   Wt 217 lb (98.4 kg)   SpO2 98%   BMI 38.44 kg/m²           Health Maintenance Due   Topic Date Due    Eye Exam Retinal or Dilated  Never done    Shingles Vaccine (1 of 2) Never done    Colorectal Cancer Screening Combo  10/01/2016    COVID-19 Vaccine (3 - Booster for Moderna series) 06/09/2021    Breast Cancer Screen Mammogram  07/01/2022    Flu Vaccine (1) 08/01/2022    MICROALBUMIN Q1  09/23/2022    Lipid Screen  09/23/2022    Pneumococcal 65+ years (3 - PPSV23 if available, else PCV20) 09/23/2022    Medicare Yearly Exam  11/06/2022         Medication Reconciliation completed, changes noted.   Please  Update medication list.

## 2022-12-21 NOTE — PROGRESS NOTES
History of Present Illness:  Sandi Oneill is a 79 y.o. female with a pmhx of asthma who is here to follow up after a recent ED visit for asthma exacerbation where she was found to be positive for covid-19 infection. Was discharged from ED with oral steroids and daily nebulized Albuterol but says her nebulizer machine is broken, so she did not use the nebulized albuterol. She has been using her Albuterol inhaler around 3 days a week up to 3 times a day since her ED visit. She is on Singulair 10mg qhs but says she sometimes forgets to take it. Denies covid infection symptoms. She has been vaccinated for covid. Past Medical History:   Diagnosis Date    Asthma     Diabetes ([facility] [address]     Hypertension        Past Surgical History:   Procedure Laterality Date    HX CHOLECYSTECTOMY  01/2022    HX TOTAL ABDOMINAL HYSTERECTOMY  2007       Current Outpatient Medications   Medication Sig Dispense Refill    cloNIDine HCL (CATAPRES) 0.2 mg tablet Take 1 Tablet by mouth two (2) times a day for 60 days. 60 Tablet 1    chlorthalidone (HYGROTON) 25 mg tablet Take 1 Tablet by mouth daily. 30 Tablet 1    budesonide-formoteroL (SYMBICORT) 80-4.5 mcg/actuation HFAA Take 2 Puffs by inhalation two (2) times a day. 10.2 g 2    albuterol (PROVENTIL VENTOLIN) 2.5 mg /3 mL (0.083 %) nebu 3 mL by Nebulization route as needed for Wheezing or Shortness of Breath. 6 mL 1    Nebulizer & Compressor machine 1 Each by Does Not Apply route as needed for Wheezing or Shortness of Breath. 1 Each 0    metFORMIN ER (GLUCOPHAGE XR) 750 mg tablet TAKE ONE TABLET BY MOUTH TWICE A  Tablet 3    famotidine (PEPCID) 40 mg tablet TAKE ONE TABLET BY MOUTH EVERY MORNING BEFORE BREAKFAST 30 Tablet 1    glimepiride (AMARYL) 4 mg tablet TAKE TWO TABLETS BY MOUTH EVERY MORNING 180 Tablet 3    metoprolol tartrate (LOPRESSOR) 25 mg tablet Take 1 Tablet by mouth two (2) times a day.  180 Tablet 1    cholestyramine-aspartame (Cholestyramine Light) 4 gram packet Take 0.5 Packets by mouth two (2) times a day. 60 Each 2    amLODIPine (NORVASC) 10 mg tablet Take 1 Tablet by mouth in the morning. 90 Tablet 3    simvastatin (ZOCOR) 40 mg tablet Take 1 Tablet by mouth nightly. 90 Tablet 3    montelukast (SINGULAIR) 10 mg tablet TAKE ONE TABLET BY MOUTH ONE TIME DAILY FOR ASTHMA PREVENTION 90 Tablet 3    losartan (COZAAR) 100 mg tablet Take 1 Tablet by mouth daily. 90 Tablet 3    fluticasone propionate (FLONASE) 50 mcg/actuation nasal spray 2 Sprays by Both Nostrils route daily. 1 Each 3    albuterol (PROVENTIL HFA, VENTOLIN HFA, PROAIR HFA) 90 mcg/actuation inhaler INHALE TWO PUFFS BY MOUTH EVERY 4 HOURS AS NEEDED FOR WHEEZING 1 Each 3       Allergies   Allergen Reactions    Codeine Nausea and Vomiting    Tramadol Nausea and Vomiting       Social History     Tobacco Use    Smoking status: Never    Smokeless tobacco: Never   Vaping Use    Vaping Use: Never used   Substance Use Topics    Alcohol use: No    Drug use: No       Family History   Problem Relation Age of Onset    Diabetes Father     Diabetes Sister        Physical Examination:     Visit Vitals  /71 (BP 1 Location: Right upper arm, BP Patient Position: Sitting)   Pulse 74   Temp 97.9 °F (36.6 °C) (Oral)   Resp 16   Ht 1.6 m   Wt 98.4 kg   LMP 09/02/2007   SpO2 98%   BMI 38.44 kg/m²       GEN: No apparent distress. Alert and oriented and responds to all questions appropriately. EYES:  Conjunctiva clear, extraocular movements areintact. EAR: External ears are normal.           LUNGS: Respirations unlabored; mild wheezing of RUL  CARDIOVASCULAR: Regular, rate, and rhythm without murmurs, gallops or rubs   NEUROLOGIC:  No focal neurologic deficits. Coordination and gait grossly intact. Assessment/Plan:    Diagnoses and all orders for this visit:    1.  Mild persistent asthma with acute exacerbation  - given frequency of Albuterol inhaler use, will need daily scheduled ICS-LABA  - asthma action plan given   - follow up in 3-4 weeks to assess efficacy of new regimen  -     budesonide-formoteroL (SYMBICORT) 80-4.5 mcg/actuation HFAA; Take 2 Puffs by inhalation two (2) times a day. -     albuterol (PROVENTIL VENTOLIN) 2.5 mg /3 mL (0.083 %) nebu; 3 mL by Nebulization route as needed for Wheezing or Shortness of Breath. -     Nebulizer & Compressor machine; 1 Each by Does Not Apply route as needed for Wheezing or Shortness of Breath. 2. Primary hypertension  -     cloNIDine HCL (CATAPRES) 0.2 mg tablet; Take 1 Tablet by mouth two (2) times a day for 60 days. -     chlorthalidone (HYGROTON) 25 mg tablet; Take 1 Tablet by mouth daily. 3. COVID-19 virus infection  - asymptomatic. Supportive care. Encounter Diagnoses     ICD-10-CM ICD-9-CM   1. Mild persistent asthma with acute exacerbation  J45.31 493.92   2. Primary hypertension  I10 401.9   3. COVID-19 virus infection  U07.1 079.89             Dari Oren expressed understanding of this plan. An AVS was printed and given to the patient. Seen and discussed with attending.     Kathia Duran MD  Family Medicine PGY-1

## 2022-12-21 NOTE — PATIENT INSTRUCTIONS
ASTHMA ACTION PLAN:    ASTHMA ACTION PLAN OF PATIENTS 12 AND OLDER    GREEN ZONE (Doing Well)   üBreathing is good (no coughing, wheezing, chest tightness, or shortness of breath during the day or night), and   üAble to do usual activities (work, play, and exercise)   ? Peak flow is more than 80% of your childs personal best    Controller Medications  Use these medication(s) EVERY DAY. Medications:  Symbicort   Directions: 2 puffs twice daily  Avoid Triggers: Cigarette smoke and secondhand smoke, Pets-animal dander, and Dust mites, dust stuffed animals, carpet     YELLOW ZONE (Caution)   üBreathing problems (coughing, wheezing, chest tightness, shortness of breath, or waking up from sleep), or   üCan do some, but not all, usual activities, or  ? Peak flow is between 60% to 80% of your childs personal best    Rescue Medications  Continue giving the controller medication(s) as prescribed. Give: Albuterol 2 - 4 puffs; repeat after 20 minutes if needed    Then:   Wait 20 minutes and see if the treatment(s) helped. If symptoms are GETTING WORSE or is NOT IMPROVING after the treatment(s), go to the Red Zone  If feeling BETTER, continue treatments every 4 hours as needed for 24 hours. Then: If still having symptoms after 24 hours, CALL YOUR CHILD'S DOCTOR    RED ZONE (Medical Alert)   üVery short of breath or constant coughing or  üRescue medications have not helped, or  üCannot do usual activities, or  ? Peak flow is less than 60% of your childs personal best    Emergency Treatment  Call Doctor or give these medication(s) AND seek medical help NOW. Take: Albuterol 1 nebulizer treatment and repeat immediately  Then: Go to hospital or call 911 you are still in the RED ZONE after 15 min AND you have not reached the doctor on the phone. CALL 911: if breathing is hard and fast, nose opens wide, ribs shows, lips and /or fingers are blue; trouble walking or talking due to shortness of breath.

## 2022-12-21 NOTE — Clinical Note
NOTIFICATION RETURN TO WORK / SCHOOL    12/26/2022 6:02 PM    Ms. Trupti Dhaliwal  [place] [facility] [address]      To Whom It May Concern:    Trupti Dhaliwal is currently under the care of [address] [facility]. She will return to work/school on: ***    If there are questions or concerns please have the patient contact our office.         Sincerely,      Cindi Gilford, MD

## 2022-12-29 ENCOUNTER — TELEPHONE (OUTPATIENT)
Dept: FAMILY MEDICINE CLINIC | Age: 67
End: 2022-12-29

## 2023-01-12 ENCOUNTER — OFFICE VISIT (OUTPATIENT)
Dept: FAMILY MEDICINE CLINIC | Age: 68
End: 2023-01-12
Payer: MEDICARE

## 2023-01-12 VITALS
HEIGHT: 63 IN | OXYGEN SATURATION: 98 % | HEART RATE: 67 BPM | WEIGHT: 214.2 LBS | SYSTOLIC BLOOD PRESSURE: 128 MMHG | DIASTOLIC BLOOD PRESSURE: 77 MMHG | TEMPERATURE: 98.2 F | BODY MASS INDEX: 37.95 KG/M2 | RESPIRATION RATE: 14 BRPM

## 2023-01-12 DIAGNOSIS — J45.31 MILD PERSISTENT ASTHMA WITH ACUTE EXACERBATION: Primary | ICD-10-CM

## 2023-01-12 NOTE — PROGRESS NOTES
Igor Toure is a 79 y.o. female    No chief complaint on file. 1. Have you been to the ER, urgent care clinic since your last visit? Hospitalized since your last visit? No  2. Have you seen or consulted any other health care providers outside of the [address] [place] [address] since your last visit? Include any pap smears or colon screening.  No    Visit Vitals  /77 (BP 1 Location: Right arm, BP Patient Position: Sitting, BP Cuff Size: Adult)   Pulse 67   Temp 98.2 °F (36.8 °C)   Resp 14   Ht 5' 3\" (1.6 m)   Wt 214 lb 3.2 oz (97.2 kg)   SpO2 98%   BMI 37.94 kg/m²     3 most recent PHQ Screens 1/12/2023   Little interest or pleasure in doing things Not at all   Feeling down, depressed, irritable, or hopeless Not at all   Total Score PHQ 2 0     Health Maintenance Due   Topic Date Due    Eye Exam Retinal or Dilated  Never done    Shingles Vaccine (1 of 2) Never done    Colorectal Cancer Screening Combo  10/01/2016    COVID-19 Vaccine (3 - Booster for Moderna series) 06/09/2021    Breast Cancer Screen Mammogram  07/01/2022    Flu Vaccine (1) 08/01/2022    Diabetic Alb to Cr ratio (uACR) test  09/23/2022    GFR test (Diabetes, CKD 3-4, OR last GFR 15-59)  09/23/2022    Lipid Screen  09/23/2022    Pneumococcal 65+ years (3 - PPSV23 if available, else PCV20) 09/23/2022    Medicare Yearly Exam  11/06/2022

## 2023-01-12 NOTE — PROGRESS NOTES
Chief Complaint   Patient presents with    Follow Up Chronic Condition     Subjective   Gene Salomon is an 79 y.o. female who presents for asthma follow up. Recently prescribed Symbicort for maintenance of asthma symptoms however was unable to get this due to it being too expensive. Says breathing is okay, has no worsening of shortness of breath. Feels sob after walking in long hallway. Uses albuterol twice a day and this is helpful. Has not had any night time awakenings. Denies cough, fevers, chills. R shoulder pain, says this is chronic. Review of Systems   See HPI    Allergies   Allergies   Allergen Reactions    Codeine Nausea and Vomiting    Tramadol Nausea and Vomiting       Medications  Current Outpatient Medications   Medication Sig    cloNIDine HCL (CATAPRES) 0.2 mg tablet Take 1 Tablet by mouth two (2) times a day for 60 days. chlorthalidone (HYGROTON) 25 mg tablet Take 1 Tablet by mouth daily. budesonide-formoteroL (SYMBICORT) 80-4.5 mcg/actuation HFAA Take 2 Puffs by inhalation two (2) times a day. Nebulizer & Compressor machine 1 Each by Does Not Apply route as needed for Wheezing or Shortness of Breath.    metFORMIN ER (GLUCOPHAGE XR) 750 mg tablet TAKE ONE TABLET BY MOUTH TWICE A DAY    famotidine (PEPCID) 40 mg tablet TAKE ONE TABLET BY MOUTH EVERY MORNING BEFORE BREAKFAST    glimepiride (AMARYL) 4 mg tablet TAKE TWO TABLETS BY MOUTH EVERY MORNING    metoprolol tartrate (LOPRESSOR) 25 mg tablet Take 1 Tablet by mouth two (2) times a day. cholestyramine-aspartame (Cholestyramine Light) 4 gram packet Take 0.5 Packets by mouth two (2) times a day. amLODIPine (NORVASC) 10 mg tablet Take 1 Tablet by mouth in the morning. simvastatin (ZOCOR) 40 mg tablet Take 1 Tablet by mouth nightly. montelukast (SINGULAIR) 10 mg tablet TAKE ONE TABLET BY MOUTH ONE TIME DAILY FOR ASTHMA PREVENTION    losartan (COZAAR) 100 mg tablet Take 1 Tablet by mouth daily.     fluticasone propionate (FLONASE) 50 mcg/actuation nasal spray 2 Sprays by Both Nostrils route daily. albuterol (PROVENTIL HFA, VENTOLIN HFA, PROAIR HFA) 90 mcg/actuation inhaler INHALE TWO PUFFS BY MOUTH EVERY 4 HOURS AS NEEDED FOR WHEEZING    albuterol (PROVENTIL VENTOLIN) 2.5 mg /3 mL (0.083 %) nebu 3 mL by Nebulization route as needed for Wheezing or Shortness of Breath. (Patient not taking: Reported on 1/12/2023)     No current facility-administered medications for this visit. Medical History  Past Medical History:   Diagnosis Date    Asthma     Diabetes ([facility] [address]     Hypertension        Immunizations   Immunization History   Administered Date(s) Administered    COVID-19, MODERNA BLUE border, Primary or Immunocompromised, (age 18y+), IM, 100 mcg/0.5mL 03/17/2021, 04/14/2021    Influenza Vaccine 09/10/2015, 11/06/2017    Influenza, FLUAD, (age 72 y+), Adjuvanted 11/05/2021    Influenza, FLUARIX, FLULAVAL, FLUZONE (age 10 mo+) AND AFLURIA, (age 1 y+), PF, 0.5mL 11/23/2016, 11/27/2018, 10/31/2019    Pneumococcal Conjugate (PCV-13) 09/23/2021    Pneumococcal Polysaccharide (PPSV-23) 08/19/2016    Tdap 09/08/2015       Objective   Vital Signs  Visit Vitals  /77 (BP 1 Location: Right arm, BP Patient Position: Sitting, BP Cuff Size: Adult)   Pulse 67   Temp 98.2 °F (36.8 °C)   Resp 14   Ht 5' 3\" (1.6 m)   Wt 214 lb 3.2 oz (97.2 kg)   LMP 09/02/2007   SpO2 98%   BMI 37.94 kg/m²       Physical Examination  Physical Exam     Assessment and Plan   Robb Gao is a 79 y.o. who presents for ***    There are no diagnoses linked to this encounter. I have discussed the aforementioned diagnoses and plan with the patient in detail. I have provided information in person and/or in AVS. All questions answered prior to discharge.       I discussed this patient with Dr. Otilio Vallejo (Attending Physician)   Signed By:  Nanci Beth DO    Family Medicine Resident Psychiatric:         Mood and Affect: Mood normal.        Assessment and Plan   Joo Jean Baptiste is a 79 y.o. who presents for asthma follow up. 1. Mild persistent asthma with acute exacerbation  Symptoms currently well controlled however patient with sub-optimal treatment regimen given inability to afford Symbicort. Will refer to pharmacist to aid in finding more affordable option. Follow-up and Dispositions    Return in about 1 month (around 2/12/2023) for medicare wellness test.           I have discussed the aforementioned diagnoses and plan with the patient in detail. I have provided information in person and/or in AVS. All questions answered prior to discharge.       I discussed this patient with Dr. Adrienne Tamayo (Attending Physician)   Signed By:  Linda Edmond DO    Family Medicine Resident

## 2023-02-06 DIAGNOSIS — I10 PRIMARY HYPERTENSION: Primary | ICD-10-CM

## 2023-02-06 RX ORDER — METOPROLOL TARTRATE 25 MG/1
25 TABLET, FILM COATED ORAL 2 TIMES DAILY
Qty: 180 TABLET | Refills: 1 | Status: SHIPPED | OUTPATIENT
Start: 2023-02-06

## 2023-02-06 NOTE — TELEPHONE ENCOUNTER
Received a fax from [facility] [employer]. Patient requesting a refill of metoprolol 25mg. Patient was last seen 12/21/2022 for asthma follow up.

## 2023-03-30 ENCOUNTER — TELEPHONE (OUTPATIENT)
Dept: PHARMACY | Age: 68
End: 2023-03-30

## 2023-03-30 NOTE — TELEPHONE ENCOUNTER
Pharmacy Progress Note - Telephone Call    Ms. Meg Silva 76 y.o. was contacted via an outbound telephone call regarding referral to discuss inhaler costs today. Unable to leave voicemail due to mailbox being full. Will try to connect with patient at another time. Thank you,  Meagan Patel.  Smith Garcia, BCPS  Clinical Pharmacist

## 2023-04-04 NOTE — TELEPHONE ENCOUNTER
Pharmacy Progress Note - Telephone Call     Ms. Carr Daniel 76 y.o. was contacted via an outbound telephone call regarding referral to discuss inhaler costs today. Unable to leave voicemail due to mailbox being full. Will try to connect with patient at another time. Thank you,  Roberta Oconnor.  Denae Jackson, BCPS  Clinical Pharmacist

## 2023-04-19 NOTE — PROGRESS NOTES
[address] [facility] [address] [address]   Office [phone]  Fax [phone]     Subjective   CC: Yanni Mckoy is a 76 y.o. female with hx of obesity, HTN, HLD, DM2, asthma who presents for follow up of chronic conditions. Type 2 diabetes:  - Current medication regimen - Metformin  mg BID, Glimepiride 8 mg daily   - Patient on ASA - no  - Patient on Statin - no, prescribed Simvastatin 40 mg daily but stopped taking it when she was on Paxlovid and never restarted  - Patient on ACE/ARB - yes, Losartan 100 mg daily   - Diet - some fried foods, potatoes, vegetables, beans, mostly eats at home, skips breakfast most days   - Exercise - walks a lot at work   - Last eye check - due for this, will make an appointment (Dr. Yoana Rogel)   - Last cholesterol check - September 2021, collecting today   - Last HbA1c - 8.0 in July 2022, collecting today   - Last urine microalbumin - September 2021, collecting today   - Last comprehensive foot exam - performing today   - Did patient receive pneumococcal vaccine - yes  - Denies changes in vision, changes in sensation, neuropathic pain, CP, SOB, polydipsia/polyuria, myalgias, and nausea/vomiting     HTN:   - Currently managed with Losartan 100 mg daily, Amlodipine 10 mg daily, Chlorthalidone 25 mg daily, Metoprolol tartrate 25 mg BID   - Has not yet taken her blood pressure medications today, was rushing here today   - No headache, vision changes, dizziness     ROS otherwise negative except as documented in HPI. Past Medical History  Past Medical History:   Diagnosis Date    Asthma     Diabetes ([facility] [address]     Hypertension        Current Medications  Current Outpatient Medications   Medication Sig Dispense Refill    cloNIDine HCL (CATAPRES) 0.2 mg tablet Take 1 Tablet by mouth two (2) times a day. metoprolol tartrate (LOPRESSOR) 25 mg tablet Take 1 Tablet by mouth two (2) times a day.  180 Tablet 1    chlorthalidone (HYGROTON) 25 mg tablet Take 1 Tablet by mouth daily. 30 Tablet 1    albuterol (PROVENTIL VENTOLIN) 2.5 mg /3 mL (0.083 %) nebu 3 mL by Nebulization route as needed for Wheezing or Shortness of Breath. 6 mL 1    Nebulizer & Compressor machine 1 Each by Does Not Apply route as needed for Wheezing or Shortness of Breath. 1 Each 0    metFORMIN ER (GLUCOPHAGE XR) 750 mg tablet TAKE ONE TABLET BY MOUTH TWICE A  Tablet 3    famotidine (PEPCID) 40 mg tablet TAKE ONE TABLET BY MOUTH EVERY MORNING BEFORE BREAKFAST 30 Tablet 1    glimepiride (AMARYL) 4 mg tablet TAKE TWO TABLETS BY MOUTH EVERY MORNING 180 Tablet 3    cholestyramine-aspartame (Cholestyramine Light) 4 gram packet Take 0.5 Packets by mouth two (2) times a day. 60 Each 2    amLODIPine (NORVASC) 10 mg tablet Take 1 Tablet by mouth in the morning. 90 Tablet 3    montelukast (SINGULAIR) 10 mg tablet TAKE ONE TABLET BY MOUTH ONE TIME DAILY FOR ASTHMA PREVENTION 90 Tablet 3    losartan (COZAAR) 100 mg tablet Take 1 Tablet by mouth daily. 90 Tablet 3    fluticasone propionate (FLONASE) 50 mcg/actuation nasal spray 2 Sprays by Both Nostrils route daily. 1 Each 3    albuterol (PROVENTIL HFA, VENTOLIN HFA, PROAIR HFA) 90 mcg/actuation inhaler INHALE TWO PUFFS BY MOUTH EVERY 4 HOURS AS NEEDED FOR WHEEZING 1 Each 3    budesonide-formoteroL (SYMBICORT) 80-4.5 mcg/actuation HFAA Take 2 Puffs by inhalation two (2) times a day. (Patient not taking: Reported on 1/12/2023) 10.2 g 2    simvastatin (ZOCOR) 40 mg tablet Take 1 Tablet by mouth nightly.  (Patient not taking: Reported on 4/20/2023) 90 Tablet 3       Allergies  Allergies   Allergen Reactions    Codeine Nausea and Vomiting    Tramadol Nausea and Vomiting       Social History   Social History     Socioeconomic History    Marital status: SINGLE     Spouse name: Not on file    Number of children: Not on file    Years of education: Not on file    Highest education level: Not on file   Occupational History    Not on file   Tobacco Use    Smoking status: Never Smokeless tobacco: Never   Vaping Use    Vaping Use: Never used   Substance and Sexual Activity    Alcohol use: No    Drug use: No    Sexual activity: Never   Other Topics Concern    Not on file   Social History Narrative    Not on file     Social Determinants of Health     Financial Resource Strain: Low Risk     Difficulty of Paying Living Expenses: Not hard at all   Food Insecurity: No Food Insecurity    Worried About Running Out of Food in the Last Year: Never true    Ran Out of Food in the Last Year: Never true   Transportation Needs: Not on file   Physical Activity: Not on file   Stress: Not on file   Social Connections: Not on file   Intimate Partner Violence: Not on file   Housing Stability: Not on file       Family History  Family History   Problem Relation Age of Onset    Diabetes Father     Diabetes Sister        Objective   Vital Signs  Visit Vitals  BP (!) 160/68 (BP 1 Location: Right upper arm, BP Patient Position: Sitting, BP Cuff Size: Adult)   Pulse 61   Temp 98 °F (36.7 °C) (Oral)   Resp 18   Ht 5' 3\" (1.6 m)   Wt 219 lb (99.3 kg)   LMP 09/02/2007   SpO2 98%   BMI 38.79 kg/m²   Initial /73, repeat 160/68. Physical Exam  Vitals reviewed. Constitutional:       General: She is not in acute distress. Appearance: She is obese. Cardiovascular:      Rate and Rhythm: Normal rate and regular rhythm. Pulses:           Dorsalis pedis pulses are 2+ on the right side and 2+ on the left side. Posterior tibial pulses are 2+ on the right side and 2+ on the left side. Heart sounds: Normal heart sounds. No murmur heard. Pulmonary:      Effort: Pulmonary effort is normal.      Breath sounds: Normal breath sounds. Musculoskeletal:      Right lower leg: No edema. Left lower leg: No edema. Feet:      Right foot:      Protective Sensation: 7 sites tested. 7 sites sensed. Skin integrity: Skin integrity normal.      Toenail Condition: Right toenails are abnormally thick. Left foot:      Protective Sensation: 7 sites tested. 7 sites sensed. Skin integrity: Skin integrity normal.      Toenail Condition: Left toenails are abnormally thick. Skin:     General: Skin is warm and dry. Neurological:      Mental Status: She is alert and oriented to person, place, and time. Assessment and Plan   David Terry is a 76 y.o. who is here for follow up of chronic conditions. 1. Type 2 diabetes mellitus with hyperglycemia, without long-term current use of insulin ([facility]) - Last A1c 8.0% in July 2022. Currently managed with Metformin  mg BID and Glimepiride 8 mg daily. Diabetic foot exam done today. Will schedule appointment with eye doctor for dilated eye exam. Collecting routine labs today and will adjust medications if needed. Advised that she schedule Medicare Annual Wellness visit in 3 months. Lab Results   Component Value Date/Time    Hemoglobin A1c 8.0 (H) 07/29/2022 11:50 AM    Hemoglobin A1c (POC) 8.4 03/09/2017 04:04 PM   -  DIABETES FOOT EXAM  - METABOLIC PANEL, BASIC; Future  - LIPID PANEL; Future  - HEMOGLOBIN A1C WITH EAG; Future  - MICROALBUMIN, UR, RAND W/ MICROALB/CREAT RATIO; Future    2. Primary hypertension - BP elevated today however patient has not yet taken her medications (Losartan 100 mg daily, Amlodipine 10 mg daily, Chlorthalidone 25 mg daily, Metoprolol tartrate 25 mg BID). Will have her monitor BP at home and follow up in 2 weeks for BP check. - METABOLIC PANEL, BASIC; Future  - MICROALBUMIN, UR, RAND W/ MICROALB/CREAT RATIO; Future    3. Hyperlipidemia LDL goal <70 - Previously was on Simvastatin 40 mg daily but has not taken this since she was treated with Paxlovid for COVID (was told to stop it and she never restarted). Will check lipid panel and either resume Simvastatin or adjust medication if needed. - LIPID PANEL; Future    4. Severe obesity (BMI 35.0-39. 9) with comorbidity (HCC) - BMI 38.79.  Encouraged healthy nutrition and regular physical activity. Would benefit from GLP-1 agonist if A1c is above goal.        Patient is counseled to return to the office if symptoms do not improve as expected. Urgent consultation with the nearest Emergency Department is strongly recommended if condition worsens. Patient is counseled to follow up as recommended and to inform the office if any changes in treatment are recommended.       I discussed this patient with Dr. Zahida Mendez (Attending Physician)       Signed By:  Daniella Gee DO  Family Medicine Resident

## 2023-04-20 ENCOUNTER — OFFICE VISIT (OUTPATIENT)
Dept: FAMILY MEDICINE CLINIC | Age: 68
End: 2023-04-20

## 2023-04-20 VITALS
HEIGHT: 63 IN | WEIGHT: 219 LBS | HEART RATE: 61 BPM | BODY MASS INDEX: 38.8 KG/M2 | RESPIRATION RATE: 18 BRPM | OXYGEN SATURATION: 98 % | SYSTOLIC BLOOD PRESSURE: 160 MMHG | DIASTOLIC BLOOD PRESSURE: 68 MMHG | TEMPERATURE: 98 F

## 2023-04-20 DIAGNOSIS — E78.5 HYPERLIPIDEMIA LDL GOAL <70: ICD-10-CM

## 2023-04-20 DIAGNOSIS — I10 PRIMARY HYPERTENSION: ICD-10-CM

## 2023-04-20 DIAGNOSIS — E11.65 TYPE 2 DIABETES MELLITUS WITH HYPERGLYCEMIA, WITHOUT LONG-TERM CURRENT USE OF INSULIN (HCC): Primary | ICD-10-CM

## 2023-04-20 DIAGNOSIS — E66.01 SEVERE OBESITY (BMI 35.0-39.9) WITH COMORBIDITY (HCC): ICD-10-CM

## 2023-04-20 RX ORDER — CLONIDINE HYDROCHLORIDE 0.2 MG/1
0.2 TABLET ORAL 2 TIMES DAILY
COMMUNITY
Start: 2023-03-24

## 2023-04-20 NOTE — PROGRESS NOTES
Identified pt with two pt identifiers(name and ). Reviewed record in preparation for visit and have obtained necessary documentation. Chief Complaint   Patient presents with    Follow Up Chronic Condition        Health Maintenance Due   Topic    Eye Exam Retinal or Dilated     Shingles Vaccine (1 of 2)    Colorectal Cancer Screening Combo     COVID-19 Vaccine (3 - Booster for Moderna series)    Breast Cancer Screen Mammogram     Diabetic Alb to Cr ratio (uACR) test     GFR test (Diabetes, CKD 3-4, OR last GFR 15-59)     Lipid Screen     Pneumococcal 65+ years (3 - PPSV23 if available, else PCV20)    Medicare Yearly Exam     Foot Exam Q1        Visit Vitals  BP (!) 164/73 (BP 1 Location: Right upper arm, BP Patient Position: Sitting, BP Cuff Size: Adult)   Pulse 61   Temp 98 °F (36.7 °C) (Oral)   Resp 18   Ht 5' 3\" (1.6 m)   Wt 219 lb (99.3 kg)   SpO2 98%   BMI 38.79 kg/m²         Coordination of Care Questionnaire:  :   1) Have you been to an emergency room, urgent care, or hospitalized since your last visit? If yes, where when, and reason for visit? no       2. Have seen or consulted any other health care provider since your last visit? If yes, where when, and reason for visit? NO      Patient is accompanied by self I have received verbal consent from Harrison Degroot to discuss any/all medical information while they are present in the room. Notified Physician of elevated BP.

## 2023-04-21 DIAGNOSIS — E11.65 TYPE 2 DIABETES MELLITUS WITH HYPERGLYCEMIA, WITHOUT LONG-TERM CURRENT USE OF INSULIN (HCC): Primary | ICD-10-CM

## 2023-04-21 LAB
ANION GAP SERPL CALC-SCNC: 6 MMOL/L (ref 5–15)
BUN SERPL-MCNC: 19 MG/DL (ref 6–20)
BUN/CREAT SERPL: 23 (ref 12–20)
CALCIUM SERPL-MCNC: 9.4 MG/DL (ref 8.5–10.1)
CHLORIDE SERPL-SCNC: 103 MMOL/L (ref 97–108)
CHOLEST SERPL-MCNC: 212 MG/DL
CO2 SERPL-SCNC: 28 MMOL/L (ref 21–32)
CREAT SERPL-MCNC: 0.84 MG/DL (ref 0.55–1.02)
CREAT UR-MCNC: 94.8 MG/DL
EST. AVERAGE GLUCOSE BLD GHB EST-MCNC: 183 MG/DL
GLUCOSE SERPL-MCNC: 157 MG/DL (ref 65–100)
HBA1C MFR BLD: 8 % (ref 4–5.6)
HDLC SERPL-MCNC: 132 MG/DL
HDLC SERPL: 1.6 (ref 0–5)
LDLC SERPL CALC-MCNC: 71.6 MG/DL (ref 0–100)
MICROALBUMIN UR-MCNC: 0.91 MG/DL
MICROALBUMIN/CREAT UR-RTO: 10 MG/G (ref 0–30)
POTASSIUM SERPL-SCNC: 4.3 MMOL/L (ref 3.5–5.1)
SODIUM SERPL-SCNC: 137 MMOL/L (ref 136–145)
TRIGL SERPL-MCNC: 42 MG/DL (ref ?–150)
VLDLC SERPL CALC-MCNC: 8.4 MG/DL

## 2023-05-01 DIAGNOSIS — J45.40 MODERATE PERSISTENT ASTHMA WITHOUT COMPLICATION: ICD-10-CM

## 2023-05-02 RX ORDER — ALBUTEROL SULFATE 90 UG/1
AEROSOL, METERED RESPIRATORY (INHALATION)
Qty: 1 EACH | Refills: 3 | Status: SHIPPED | OUTPATIENT
Start: 2023-05-02

## 2023-05-04 LAB — DIABETIC RETINOPATHY: NEGATIVE

## 2023-05-16 RX ORDER — LOSARTAN POTASSIUM 100 MG/1
100 TABLET ORAL DAILY
Qty: 90 TABLET | Refills: 3 | Status: SHIPPED | OUTPATIENT
Start: 2023-05-16

## 2023-05-25 ENCOUNTER — OFFICE VISIT (OUTPATIENT)
Age: 68
End: 2023-05-25
Payer: MEDICARE

## 2023-05-25 VITALS
BODY MASS INDEX: 39.05 KG/M2 | HEIGHT: 63 IN | OXYGEN SATURATION: 98 % | TEMPERATURE: 97.5 F | WEIGHT: 220.4 LBS | HEART RATE: 64 BPM | DIASTOLIC BLOOD PRESSURE: 54 MMHG | SYSTOLIC BLOOD PRESSURE: 114 MMHG

## 2023-05-25 DIAGNOSIS — J45.20 MILD INTERMITTENT ASTHMA WITHOUT COMPLICATION: ICD-10-CM

## 2023-05-25 DIAGNOSIS — I10 ESSENTIAL (PRIMARY) HYPERTENSION: Primary | ICD-10-CM

## 2023-05-25 PROCEDURE — 99213 OFFICE O/P EST LOW 20 MIN: CPT

## 2023-05-25 PROCEDURE — 3074F SYST BP LT 130 MM HG: CPT | Performed by: FAMILY MEDICINE

## 2023-05-25 PROCEDURE — 1123F ACP DISCUSS/DSCN MKR DOCD: CPT | Performed by: FAMILY MEDICINE

## 2023-05-25 PROCEDURE — 3078F DIAST BP <80 MM HG: CPT | Performed by: FAMILY MEDICINE

## 2023-05-25 RX ORDER — ALBUTEROL SULFATE 90 UG/1
2 AEROSOL, METERED RESPIRATORY (INHALATION) EVERY 4 HOURS PRN
Qty: 2 EACH | Refills: 5 | Status: SHIPPED | OUTPATIENT
Start: 2023-05-25

## 2023-05-25 RX ORDER — CHLORTHALIDONE 25 MG/1
25 TABLET ORAL DAILY
Qty: 90 TABLET | Refills: 1 | Status: SHIPPED | OUTPATIENT
Start: 2023-05-25

## 2023-05-25 RX ORDER — AMLODIPINE BESYLATE 10 MG/1
10 TABLET ORAL DAILY
Qty: 90 TABLET | Refills: 1 | Status: SHIPPED | OUTPATIENT
Start: 2023-05-25

## 2023-05-25 RX ORDER — LOSARTAN POTASSIUM 100 MG/1
100 TABLET ORAL DAILY
Qty: 90 TABLET | Refills: 3 | Status: SHIPPED | OUTPATIENT
Start: 2023-05-25

## 2023-05-25 SDOH — ECONOMIC STABILITY: INCOME INSECURITY: HOW HARD IS IT FOR YOU TO PAY FOR THE VERY BASICS LIKE FOOD, HOUSING, MEDICAL CARE, AND HEATING?: NOT HARD AT ALL

## 2023-05-25 SDOH — ECONOMIC STABILITY: FOOD INSECURITY: WITHIN THE PAST 12 MONTHS, THE FOOD YOU BOUGHT JUST DIDN'T LAST AND YOU DIDN'T HAVE MONEY TO GET MORE.: NEVER TRUE

## 2023-05-25 SDOH — ECONOMIC STABILITY: HOUSING INSECURITY
IN THE LAST 12 MONTHS, WAS THERE A TIME WHEN YOU DID NOT HAVE A STEADY PLACE TO SLEEP OR SLEPT IN A SHELTER (INCLUDING NOW)?: NO

## 2023-05-25 SDOH — ECONOMIC STABILITY: FOOD INSECURITY: WITHIN THE PAST 12 MONTHS, YOU WORRIED THAT YOUR FOOD WOULD RUN OUT BEFORE YOU GOT MONEY TO BUY MORE.: NEVER TRUE

## 2023-05-25 ASSESSMENT — PATIENT HEALTH QUESTIONNAIRE - PHQ9
SUM OF ALL RESPONSES TO PHQ QUESTIONS 1-9: 0
2. FEELING DOWN, DEPRESSED OR HOPELESS: 0
SUM OF ALL RESPONSES TO PHQ QUESTIONS 1-9: 0
SUM OF ALL RESPONSES TO PHQ QUESTIONS 1-9: 0
SUM OF ALL RESPONSES TO PHQ9 QUESTIONS 1 & 2: 0
SUM OF ALL RESPONSES TO PHQ QUESTIONS 1-9: 0
1. LITTLE INTEREST OR PLEASURE IN DOING THINGS: 0

## 2023-05-25 NOTE — PROGRESS NOTES
Chief Complaint   Patient presents with    Blood Pressure Check      Subjective   Era Lamas is an 76 y.o. female who presents for BP check. Took all meds today and BP well controlled. Home BP controlled as well when she checks after taking her medication. No symptoms of light headedness. No chest pain. Occasional shortness of breath associated with asthma. Requesting refill of inhaler. Review of Systems   Review of Systems   See HPI    Allergies   Allergies   Allergen Reactions    Codeine Nausea And Vomiting    Tramadol Nausea And Vomiting       Medications  Current Outpatient Medications   Medication Sig    albuterol sulfate HFA (PROVENTIL;VENTOLIN;PROAIR) 108 (90 Base) MCG/ACT inhaler Inhale 2 puffs into the lungs every 4 hours as needed for Wheezing or Shortness of Breath    losartan (COZAAR) 100 MG tablet Take 1 tablet by mouth daily    metoprolol tartrate (LOPRESSOR) 25 MG tablet Take 1 tablet by mouth 2 times daily    chlorthalidone (HYGROTON) 25 MG tablet Take 1 tablet by mouth daily    amLODIPine (NORVASC) 10 MG tablet Take 1 tablet by mouth daily    albuterol (PROVENTIL) (2.5 MG/3ML) 0.083% nebulizer solution Inhale 3 mLs into the lungs as needed    famotidine (PEPCID) 40 MG tablet Take 1 tablet by mouth every morning (before breakfast)    fluticasone (FLONASE) 50 MCG/ACT nasal spray 2 sprays by Nasal route daily    glimepiride (AMARYL) 4 MG tablet TAKE TWO TABLETS BY MOUTH EVERY MORNING    metFORMIN (GLUCOPHAGE-XR) 750 MG extended release tablet Take 1 tablet by mouth 2 times daily    montelukast (SINGULAIR) 10 MG tablet TAKE ONE TABLET BY MOUTH ONE TIME DAILY FOR ASTHMA PREVENTION    cholestyramine light (PREVALITE) 4 GM/DOSE powder Take 2 g by mouth 2 times daily (Patient not taking: Reported on 5/25/2023)    simvastatin (ZOCOR) 40 MG tablet Take 1 tablet by mouth nightly (Patient not taking: Reported on 5/25/2023)     No current facility-administered medications for this visit.        Medical

## 2023-05-25 NOTE — PROGRESS NOTES
Identified pt with two pt identifiers(name and ). Reviewed record in preparation for visit and have obtained necessary documentation. Chief Complaint   Patient presents with    Blood Pressure Check        Health Maintenance Due   Topic    Diabetic retinal exam     Hepatitis C screen     Colorectal Cancer Screen     Shingles vaccine (1 of 2)    DEXA (modify frequency per FRAX score)     COVID-19 Vaccine (3 - Booster for Moderna series)    Breast cancer screen     Pneumococcal 65+ years Vaccine (3 - PPSV23 if available, else PCV20)    Annual Wellness Visit (AWV)        Vitals:    23 0837 23 0839   BP: (!) 120/58 (!) 114/54   Site: Right Upper Arm    Position: Sitting    Cuff Size: Large Adult    Pulse: 64    Temp: 97.5 °F (36.4 °C)    TempSrc: Temporal    SpO2: 98%    Weight: 220 lb 6.4 oz (100 kg)    Height: 5' 3\" (1.6 m)         Coordination of Care Questionnaire:  :   1) Have you been to an emergency room, urgent care, or hospitalized since your last visit? If yes, where when, and reason for visit? No    2. Have seen or consulted any other health care provider since your last visit? If yes, where when, and reason for visit? No      3)     Patient is accompanied by self I have received verbal consent from Diandra Moreland to discuss any/all medical information while they are present in the room.

## 2023-07-19 ENCOUNTER — NURSE TRIAGE (OUTPATIENT)
Dept: OTHER | Facility: CLINIC | Age: 68
End: 2023-07-19

## 2023-07-19 NOTE — TELEPHONE ENCOUNTER
Location of patient: [address] [facility] [place] call from Fredy at [facility] with [place]. Subjective: Caller states \"My asthma is acting up\"     Current Symptoms: Wheezing on and off, no SOB. Last Albuterol used at 0645 this AM     Onset: a few days ago; unchanged    Associated Symptoms: NA    Pain Severity: 0/10; N/A; none    Temperature: denies fever     What has been tried: Albuterol    LMP: NA Pregnant: NA    Recommended disposition: See in Office Today or Tomorrow    Care advice provided, patient verbalizes understanding; denies any other questions or concerns; instructed to call back for any new or worsening symptoms. Patient/Caller agrees with recommended disposition; writer provided warm transfer to [employer] [facility] at [facility] for appointment scheduling    Attention Provider: Thank you for allowing me to participate in the care of your patient. The patient was connected to triage in response to information provided to the ECC/PSC. Please do not respond through this encounter as the response is not directed to a shared pool.     Reason for Disposition   Mild wheezing comes and goes and lasts > 3 days    Protocols used: Asthma Attack-ADULT-[place]

## 2023-07-21 ENCOUNTER — OFFICE VISIT (OUTPATIENT)
Age: 68
End: 2023-07-21
Payer: MEDICARE

## 2023-07-21 VITALS
BODY MASS INDEX: 38.45 KG/M2 | RESPIRATION RATE: 16 BRPM | SYSTOLIC BLOOD PRESSURE: 127 MMHG | OXYGEN SATURATION: 97 % | HEIGHT: 63 IN | WEIGHT: 217 LBS | DIASTOLIC BLOOD PRESSURE: 66 MMHG | HEART RATE: 66 BPM

## 2023-07-21 DIAGNOSIS — J45.31 MILD PERSISTENT ASTHMA WITH (ACUTE) EXACERBATION: Primary | ICD-10-CM

## 2023-07-21 PROCEDURE — 99213 OFFICE O/P EST LOW 20 MIN: CPT

## 2023-07-21 PROCEDURE — 1123F ACP DISCUSS/DSCN MKR DOCD: CPT

## 2023-07-21 PROCEDURE — 3074F SYST BP LT 130 MM HG: CPT

## 2023-07-21 PROCEDURE — 3078F DIAST BP <80 MM HG: CPT

## 2023-07-21 RX ORDER — PREDNISONE 20 MG/1
40 TABLET ORAL DAILY
Qty: 10 TABLET | Refills: 0 | Status: SHIPPED | OUTPATIENT
Start: 2023-07-21 | End: 2023-07-26

## 2023-07-21 NOTE — PATIENT INSTRUCTIONS
- take 40mg (two pills) of prednisone daily for the next x5 days.  Take this with food each morning  - if your symptoms worsen, you develop and fever or change in sputum (mucus) color, please call the clinic or go directly to the ER.  - continue to use your albuterol as need

## 2023-08-17 ENCOUNTER — TELEPHONE (OUTPATIENT)
Age: 68
End: 2023-08-17

## 2023-08-17 NOTE — TELEPHONE ENCOUNTER
Sasha Barcenas MD ,    Arvind Gonzalez has an appointment with you 23 and has been identified with a care gap for Statin Use in Person With Diabetes (SUPD). Per patient chart review: was previously taking Simvastatin 40 mg, but there was no indication on why this medication was stopped. OV 23 - from labs was discussed to take her metformin as prescribed- BID, and were not changing the statin dose at that time. No notes, or messages to stop this medication in the chart. ADA /  ACC Guidelines indicate the patient is a candidate for a moderate-intensity statin. 2018 ACC/AHA/  ADA Guidelines:  >/= 37-79 yo ; Patient has: Adults 36to 76years of age with diabetes mellitus, regardless of estimated 10-year ASCVD risk, moderate-intensity statin therapy is indicated if LDL >70 mg/dL. ([address] [facility] 2018). LDL Target goal: <70mg/dL- Aged 37-79 yo with Diabetes         STATIN GAP PLAN  The following are interventions that have been identified:  Statin Use in Person With Diabetes (SUPD) Gap Identified from [place] ([facility]) Records dated: 23. A target goal for: <70mg/dL- Aged 37-79 yo with Diabetes    Statin not active on medication list as \"Taking\", no fill hx for current calendar year. No refills remain/Rx . Will pend for Refills to Provider  Patient stopped medication and no notes/indications to stop in patient chart- recommend to restart simvastatin 40mg. Or retrial of a different statin. If Arvind Gonzalez has tried and failed two statins in the past from CURTIS's, this patient can be excluded from this care gap by placing a CMS approved dx code in a billable office visit. If Arvind Gonzalez cannot take a statin due to any of the following, please place a dx code in the patient's visit to have Arvind Gonzalez excluded from this care gap for .      Please submit one of the following CMS allowable diagnoses codes as a visit diagnosis:  Myopathy (G72.0, G72.89, G72.9)  Rhabdomyolysis

## 2023-08-18 ENCOUNTER — OFFICE VISIT (OUTPATIENT)
Age: 68
End: 2023-08-18

## 2023-08-18 VITALS
WEIGHT: 220 LBS | SYSTOLIC BLOOD PRESSURE: 108 MMHG | DIASTOLIC BLOOD PRESSURE: 67 MMHG | HEIGHT: 64 IN | HEART RATE: 64 BPM | OXYGEN SATURATION: 97 % | BODY MASS INDEX: 37.56 KG/M2

## 2023-08-18 DIAGNOSIS — E11.9 DIABETES MELLITUS TYPE II, NON INSULIN DEPENDENT (HCC): ICD-10-CM

## 2023-08-18 DIAGNOSIS — M25.511 CHRONIC RIGHT SHOULDER PAIN: ICD-10-CM

## 2023-08-18 DIAGNOSIS — Z00.00 ENCOUNTER FOR ANNUAL WELLNESS EXAM IN MEDICARE PATIENT: Primary | ICD-10-CM

## 2023-08-18 DIAGNOSIS — Z12.31 ENCOUNTER FOR SCREENING MAMMOGRAM FOR MALIGNANT NEOPLASM OF BREAST: ICD-10-CM

## 2023-08-18 DIAGNOSIS — G89.29 CHRONIC RIGHT SHOULDER PAIN: ICD-10-CM

## 2023-08-18 DIAGNOSIS — E11.65 TYPE 2 DIABETES MELLITUS WITH HYPERGLYCEMIA, WITHOUT LONG-TERM CURRENT USE OF INSULIN (HCC): ICD-10-CM

## 2023-08-18 DIAGNOSIS — Z12.11 COLON CANCER SCREENING: ICD-10-CM

## 2023-08-18 ASSESSMENT — PATIENT HEALTH QUESTIONNAIRE - PHQ9
SUM OF ALL RESPONSES TO PHQ QUESTIONS 1-9: 0
2. FEELING DOWN, DEPRESSED OR HOPELESS: 0
1. LITTLE INTEREST OR PLEASURE IN DOING THINGS: 0
SUM OF ALL RESPONSES TO PHQ9 QUESTIONS 1 & 2: 0

## 2023-08-18 NOTE — PROGRESS NOTES
Chief Complaint   Patient presents with    Medicare AWV     Vitals:    08/18/23 1010   BP: 108/67   Pulse: 64   SpO2: 97%

## 2023-08-18 NOTE — PATIENT INSTRUCTIONS
You can get both the new COVID bivalent vaccine and the Shingles vaccine at your pharmacy. Please call and schedule a colonoscopy. Please schedule your mammogram at [facility]. Please schedule your appointment with gynecology at [facility]. Schedule an appointment with physical therapy. Follow-up with me in 6 weeks.

## 2023-08-18 NOTE — PROGRESS NOTES
[facility]  [address] [facility], [address] [place]  [phone]           Date of visit: 8/18/2023       This is an Subsequent Medicare Annual Wellness Visit (AWV), (Performed more than 12 months after effective date of Medicare Part B enrollment and 12 months after last preventive visit)    I have reviewed the patient's medical history in detail and updated the computerized patient record. Niki Lou is a 76 y.o. female   History obtained from: patient. Concerns today   (Patient understands that medical problems addressed today may incur additional cost as this is a preventive visit). Patient has concerns regarding her right shoulder.   - Has had pain in R shoulder and R upper back for several years  - Has tried Tylenol in the past without much benefit. - Pain is most prominent on lateral aspect of shoulder, radiates to elbow at times  - Denies neck pain, numbness or weakness in R upper extremity  - Has never tried PT or injections for this pain. Has never had an x-ray. - No h/o injury to the shoulder.     History     Patient Active Problem List   Diagnosis    RAD (reactive airway disease)    Facial droop    H/O colonoscopy    Obesity, Class III, BMI 40-49.9 (morbid obesity) ([address])    Environmental and seasonal allergies    Gout    S/P MARI-BSO    Essential hypertension    OA (osteoarthritis)    Moderate persistent asthma without complication    Diabetes mellitus type II, non insulin dependent (HCC)    Diabetes mellitus without complication ([address])    Type 2 diabetes mellitus with chronic kidney disease (HCC)    Chronic renal disease, stage III ([address])    Type 2 diabetes mellitus with hyperglycemia     Past Medical History:   Diagnosis Date    Asthma     Diabetes ([address])     Hypertension       Past Surgical History:   Procedure Laterality Date    CHOLECYSTECTOMY  01/2022    HYSTERECTOMY, TOTAL ABDOMINAL (CERVIX REMOVED)  2007     Allergies   Allergen Reactions    Codeine

## 2023-08-19 LAB
EST. AVERAGE GLUCOSE BLD GHB EST-MCNC: 194 MG/DL
HBA1C MFR BLD: 8.4 % (ref 4–5.6)

## 2023-09-01 ENCOUNTER — TELEPHONE (OUTPATIENT)
Age: 68
End: 2023-09-01

## 2023-09-01 NOTE — TELEPHONE ENCOUNTER
Patient called to request a refill for the following medications:    Amlodipine - the pharmacy gave her a 3 day supply to get through the weekend    Chlorthalidone and glimepiride    I verified she is using the [facility] at [facility]

## 2023-09-07 DIAGNOSIS — I10 ESSENTIAL (PRIMARY) HYPERTENSION: ICD-10-CM

## 2023-09-07 RX ORDER — CHLORTHALIDONE 25 MG/1
25 TABLET ORAL DAILY
Qty: 90 TABLET | Refills: 1 | Status: SHIPPED | OUTPATIENT
Start: 2023-09-07

## 2023-09-07 RX ORDER — AMLODIPINE BESYLATE 10 MG/1
10 TABLET ORAL DAILY
Qty: 90 TABLET | Refills: 1 | Status: SHIPPED | OUTPATIENT
Start: 2023-09-07

## 2023-09-07 NOTE — TELEPHONE ENCOUNTER
Medication Refill Request    Niki Lou is requesting a refill of the following medication(s):   Requested Prescriptions     Pending Prescriptions Disp Refills    chlorthalidone (HYGROTON) 25 MG tablet 90 tablet 1     Sig: Take 1 tablet by mouth daily    amLODIPine (NORVASC) 10 MG tablet 90 tablet 1     Sig: Take 1 tablet by mouth daily      Last provider to prescribe medication: Sapna  Last Date of Medication Prescribed: 05/25/2023   Last Office Visit Date: 07/21/2023    Please send refill to:    [address] [phone]  [address] [place] [address]  Phone: [phone] Fax: [phone]

## 2023-09-21 ENCOUNTER — TELEPHONE (OUTPATIENT)
Dept: PHARMACY | Facility: CLINIC | Age: 68
End: 2023-09-21

## 2023-09-21 NOTE — TELEPHONE ENCOUNTER
Dr. Molly Espitia,    Edwin Torres has an appointment with you 9/22/2023 and has been identified with a care gap for Statin Use in Person With Diabetes (SUPD). Per patient chart review: was previously taking Simvastatin 40 mg , but there was no indication on why this medication was stopped. 4/20/2023 Janine Miller MD notes: Patient on Statin - no, prescribed Simvastatin 40 mg daily but stopped taking it when she was on Paxlovid and never restarted\"  Labs from 4/20/2023- to restart/ stay on same dose of statin. ADA 2023/ 2018 ACC Guidelines indicate the patient is a candidate for a moderate-intensity statin. 2018 ACC/AHA/ 2023 ADA Guidelines:  >/= 37-79 yo ; Patient has: Adults 36to 76years of age with diabetes mellitus, regardless of estimated 10-year ASCVD risk, moderate-intensity statin therapy is indicated if LDL >70 mg/dL. ([address] [facility] 2018). LDL Target goal: <70mg/dL- Aged 37-79 yo with Diabetes       STATIN GAP PLAN  The following are interventions that have been identified:  Statin Use in Person With Diabetes (SUPD) Gap Identified from [place] ([facility]) Records dated: 9/21/2023. A target LDL goal for: <70mg/dL- Aged 37-79 yo with Diabetes    Recommend restart of previous statin at same dose OR-retrial of a different statin at this office visit. If Edwin Torres has tried and failed two statins in the past from [facility]TIS's, this patient can be excluded from this care gap by placing a CMS approved dx code in a billable office visit. If Edwin Torres cannot take a statin due to any of the following, please place a dx code in the patient's visit to have Edwin Torres excluded from this care gap for 2023.      Please submit one of the following CMS allowable diagnoses codes as a visit diagnosis:  Myopathy (G72.0, G72.89, G72.9)  Rhabdomyolysis (M62.82)  Prediabetes (R73.03, R73.09)  ESRD (N18.5, N18.6, Z99.2) or Dialysis  Cirrhosis (K74.60, K70.30, K70.31, K71.7, K74.3, K74.4, K74.5, K74.69)  Polycystic Ovary Syndrome (PCOS-

## 2023-09-22 ENCOUNTER — OFFICE VISIT (OUTPATIENT)
Age: 68
End: 2023-09-22
Payer: MEDICARE

## 2023-09-22 VITALS
WEIGHT: 221 LBS | BODY MASS INDEX: 37.93 KG/M2 | HEART RATE: 66 BPM | SYSTOLIC BLOOD PRESSURE: 123 MMHG | DIASTOLIC BLOOD PRESSURE: 67 MMHG | OXYGEN SATURATION: 96 %

## 2023-09-22 DIAGNOSIS — I10 ESSENTIAL (PRIMARY) HYPERTENSION: ICD-10-CM

## 2023-09-22 DIAGNOSIS — E11.9 DIABETES MELLITUS TYPE II, NON INSULIN DEPENDENT (HCC): Primary | ICD-10-CM

## 2023-09-22 DIAGNOSIS — M25.819 SHOULDER IMPINGEMENT: ICD-10-CM

## 2023-09-22 PROCEDURE — 99214 OFFICE O/P EST MOD 30 MIN: CPT | Performed by: STUDENT IN AN ORGANIZED HEALTH CARE EDUCATION/TRAINING PROGRAM

## 2023-09-22 RX ORDER — CHLORTHALIDONE 25 MG/1
25 TABLET ORAL DAILY
Qty: 90 TABLET | Refills: 1 | Status: CANCELLED | OUTPATIENT
Start: 2023-09-22

## 2023-09-22 RX ORDER — MELOXICAM 15 MG/1
15 TABLET ORAL DAILY
Qty: 30 TABLET | Refills: 0 | Status: SHIPPED | OUTPATIENT
Start: 2023-09-22

## 2023-09-22 ASSESSMENT — PATIENT HEALTH QUESTIONNAIRE - PHQ9
SUM OF ALL RESPONSES TO PHQ QUESTIONS 1-9: 0
1. LITTLE INTEREST OR PLEASURE IN DOING THINGS: 0
SUM OF ALL RESPONSES TO PHQ QUESTIONS 1-9: 0
SUM OF ALL RESPONSES TO PHQ QUESTIONS 1-9: 0
2. FEELING DOWN, DEPRESSED OR HOPELESS: 0
SUM OF ALL RESPONSES TO PHQ QUESTIONS 1-9: 0
SUM OF ALL RESPONSES TO PHQ9 QUESTIONS 1 & 2: 0

## 2023-09-22 NOTE — PROGRESS NOTES
Chief Complaint   Patient presents with    Follow-up     Vitals:    09/22/23 0837   BP: 123/67   Pulse: 66   SpO2: 96%

## 2023-09-22 NOTE — PATIENT INSTRUCTIONS
Work on your nutrition - try the plate method (see handout). Start physical therapy. Take Meloxicam once per day with food for shoulder pain. You can still take Tylenol with this medication if needed. You can try Melatonin for sleep if needed. Follow-up in 6 weeks.

## 2023-09-22 NOTE — PROGRESS NOTES
[address]  [address] Sports Medicine      Chief Complaint:   Chief Complaint   Patient presents with    Follow-up       SUBJECTIVE:  Gutierrez Thompson is a 76 y.o. female who presents for f/u of DM2.     - Continues on Metformin  mg BID (could not tolerate higher dose due to GI side effects), Glimepiride 4 mg daily  - She has been trying to make small changes to nutrition, stopped eating as much fried foods   - Typical meals as follows:   - Breakfast: skips   - Lunch: baked chicken, soup   - Dinner: chicken, dumplings  - Last eye exam: within the past year    - Pt reports she continues to have pain in R shoulder  - Has not yet started PT  - Taking Tylenol with some relief   - Pain rates 7/10 when present       PMHx:  Past Medical History:   Diagnosis Date    Asthma     Diabetes ([address])     Hypertension        Meds:   Current Outpatient Medications   Medication Sig Dispense Refill    metoprolol tartrate (LOPRESSOR) 25 MG tablet Take 1 tablet by mouth 2 times daily 60 tablet 2    meloxicam (MOBIC) 15 MG tablet Take 1 tablet by mouth daily 30 tablet 0    chlorthalidone (HYGROTON) 25 MG tablet Take 1 tablet by mouth daily 90 tablet 1    amLODIPine (NORVASC) 10 MG tablet Take 1 tablet by mouth daily 90 tablet 1    albuterol sulfate HFA (PROVENTIL;VENTOLIN;PROAIR) 108 (90 Base) MCG/ACT inhaler Inhale 2 puffs into the lungs every 4 hours as needed for Wheezing or Shortness of Breath 2 each 5    losartan (COZAAR) 100 MG tablet Take 1 tablet by mouth daily 90 tablet 3    albuterol (PROVENTIL) (2.5 MG/3ML) 0.083% nebulizer solution Inhale 3 mLs into the lungs as needed      famotidine (PEPCID) 40 MG tablet Take 1 tablet by mouth every morning (before breakfast)      fluticasone (FLONASE) 50 MCG/ACT nasal spray 2 sprays by Nasal route daily      glimepiride (AMARYL) 4 MG tablet TAKE TWO TABLETS BY MOUTH EVERY MORNING      metFORMIN (GLUCOPHAGE-XR) 750 MG

## 2023-10-26 ENCOUNTER — TELEPHONE (OUTPATIENT)
Age: 68
End: 2023-10-26

## 2023-10-26 NOTE — TELEPHONE ENCOUNTER
----- Message from Dave Pedro sent at 10/26/2023  9:29 AM EDT -----  Subject: Appointment Request    Reason for Call: Established Patient Appointment needed: Routine Existing   Condition Follow Up    QUESTIONS    Reason for appointment request? No appointments available during search     Additional Information for Provider? Pt called in trying to change her   appt. i tried to change it but it would not allow me too. Pt needs a   follow up.  ---------------------------------------------------------------------------  --------------  [facility] INFO  [phone];  Do not leave any message, patient will call back for answer  ---------------------------------------------------------------------------  --------------  SCRIPT ANSWERS

## 2023-12-29 ENCOUNTER — OFFICE VISIT (OUTPATIENT)
Age: 68
End: 2023-12-29
Payer: MEDICARE

## 2023-12-29 VITALS
TEMPERATURE: 97.8 F | WEIGHT: 219 LBS | OXYGEN SATURATION: 97 % | RESPIRATION RATE: 18 BRPM | HEIGHT: 64 IN | DIASTOLIC BLOOD PRESSURE: 59 MMHG | BODY MASS INDEX: 37.39 KG/M2 | HEART RATE: 74 BPM | SYSTOLIC BLOOD PRESSURE: 144 MMHG

## 2023-12-29 DIAGNOSIS — S46.811A TRAPEZIUS MUSCLE STRAIN, RIGHT, INITIAL ENCOUNTER: Primary | ICD-10-CM

## 2023-12-29 PROCEDURE — 99213 OFFICE O/P EST LOW 20 MIN: CPT | Performed by: STUDENT IN AN ORGANIZED HEALTH CARE EDUCATION/TRAINING PROGRAM

## 2023-12-29 PROCEDURE — 20552 NJX 1/MLT TRIGGER POINT 1/2: CPT | Performed by: STUDENT IN AN ORGANIZED HEALTH CARE EDUCATION/TRAINING PROGRAM

## 2023-12-29 RX ORDER — BETAMETHASONE SODIUM PHOSPHATE AND BETAMETHASONE ACETATE 3; 3 MG/ML; MG/ML
6 INJECTION, SUSPENSION INTRA-ARTICULAR; INTRALESIONAL; INTRAMUSCULAR; SOFT TISSUE ONCE
Status: COMPLETED | OUTPATIENT
Start: 2023-12-29 | End: 2023-12-29

## 2023-12-29 RX ORDER — LIDOCAINE HYDROCHLORIDE 10 MG/ML
1 INJECTION, SOLUTION INFILTRATION; PERINEURAL ONCE
Status: COMPLETED | OUTPATIENT
Start: 2023-12-29 | End: 2023-12-29

## 2023-12-29 RX ORDER — LIDOCAINE HYDROCHLORIDE 10 MG/ML
2 INJECTION, SOLUTION INFILTRATION; PERINEURAL ONCE
Status: DISCONTINUED | OUTPATIENT
Start: 2023-12-29 | End: 2023-12-29

## 2023-12-29 RX ADMIN — LIDOCAINE HYDROCHLORIDE 1 ML: 10 INJECTION, SOLUTION INFILTRATION; PERINEURAL at 11:26

## 2023-12-29 RX ADMIN — BETAMETHASONE SODIUM PHOSPHATE AND BETAMETHASONE ACETATE 6 MG: 3; 3 INJECTION, SUSPENSION INTRA-ARTICULAR; INTRALESIONAL; INTRAMUSCULAR at 11:24

## 2023-12-29 NOTE — PROGRESS NOTES
[address] Sports Medicine      Chief Complaint:   Chief Complaint   Patient presents with    Shoulder Pain       History of Present Illness     Patient Identification  Destiny Dean is a 76 y.o. female complains of pain in the right shoulder pain. 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     Past Medical History:   Diagnosis Date    Asthma     Diabetes ([address])     Hypertension      Family History   Problem Relation Age of Onset    Diabetes Father     Diabetes Sister      Current Outpatient Medications   Medication Sig Dispense Refill    glimepiride (AMARYL) 4 MG tablet TAKE TWO TABLETS BY MOUTH EVERY MORNING 30 tablet 2    metoprolol tartrate (LOPRESSOR) 25 MG tablet Take 1 tablet by mouth 2 times daily 60 tablet 2    meloxicam (MOBIC) 15 MG tablet Take 1 tablet by mouth daily 30 tablet 0    chlorthalidone (HYGROTON) 25 MG tablet Take 1 tablet by mouth daily 90 tablet 1    amLODIPine (NORVASC) 10 MG tablet Take 1 tablet by mouth daily 90 tablet 1    albuterol sulfate HFA (PROVENTIL;VENTOLIN;PROAIR) 108 (90 Base) MCG/ACT inhaler Inhale 2 puffs into the lungs every 4 hours as needed for Wheezing or Shortness of Breath 2 each 5    losartan (COZAAR) 100 MG tablet Take 1 tablet by mouth daily 90 tablet 3    albuterol (PROVENTIL) (2.5 MG/3ML) 0.083% nebulizer solution Inhale 3 mLs into the lungs as needed      famotidine (PEPCID) 40 MG tablet Take 1 tablet by

## 2023-12-29 NOTE — PROGRESS NOTES
Patient here today for further evaluation of right should pain. Reports no new injuries or falls. States that shoulder has been bothering her for \"a while\" Interested in injection. States pain is worse when lying on site and while working/lifting.

## 2024-01-09 DIAGNOSIS — I10 ESSENTIAL (PRIMARY) HYPERTENSION: ICD-10-CM

## 2024-01-22 RX ORDER — AMLODIPINE BESYLATE 10 MG/1
10 TABLET ORAL DAILY
Qty: 90 TABLET | Refills: 1 | Status: SHIPPED | OUTPATIENT
Start: 2024-01-22

## 2024-02-01 ENCOUNTER — OFFICE VISIT (OUTPATIENT)
Age: 69
End: 2024-02-01
Payer: MEDICARE

## 2024-02-01 VITALS
OXYGEN SATURATION: 98 % | TEMPERATURE: 97.3 F | HEIGHT: 64 IN | WEIGHT: 223 LBS | RESPIRATION RATE: 17 BRPM | SYSTOLIC BLOOD PRESSURE: 140 MMHG | HEART RATE: 65 BPM | BODY MASS INDEX: 38.07 KG/M2 | DIASTOLIC BLOOD PRESSURE: 70 MMHG

## 2024-02-01 DIAGNOSIS — S46.811D STRAIN OF RIGHT TRAPEZIUS MUSCLE, SUBSEQUENT ENCOUNTER: ICD-10-CM

## 2024-02-01 DIAGNOSIS — M67.911 TENDINOPATHY OF RIGHT ROTATOR CUFF: Primary | ICD-10-CM

## 2024-02-01 DIAGNOSIS — Z09 HOSPITAL DISCHARGE FOLLOW-UP: ICD-10-CM

## 2024-02-01 DIAGNOSIS — M25.819 SHOULDER IMPINGEMENT: ICD-10-CM

## 2024-02-01 PROCEDURE — 99213 OFFICE O/P EST LOW 20 MIN: CPT

## 2024-02-01 RX ORDER — LIDOCAINE 4 G/G
1 PATCH TOPICAL DAILY
COMMUNITY

## 2024-02-01 RX ORDER — SENNOSIDES 8.6 MG
650 CAPSULE ORAL EVERY 8 HOURS PRN
COMMUNITY

## 2024-02-01 RX ORDER — MELOXICAM 7.5 MG/1
7.5 TABLET ORAL DAILY
Qty: 30 TABLET | Refills: 0 | Status: SHIPPED | OUTPATIENT
Start: 2024-02-01

## 2024-02-01 ASSESSMENT — PATIENT HEALTH QUESTIONNAIRE - PHQ9
SUM OF ALL RESPONSES TO PHQ QUESTIONS 1-9: 0
SUM OF ALL RESPONSES TO PHQ9 QUESTIONS 1 & 2: 0
2. FEELING DOWN, DEPRESSED OR HOPELESS: 0
SUM OF ALL RESPONSES TO PHQ QUESTIONS 1-9: 0
SUM OF ALL RESPONSES TO PHQ QUESTIONS 1-9: 0
1. LITTLE INTEREST OR PLEASURE IN DOING THINGS: 0
SUM OF ALL RESPONSES TO PHQ QUESTIONS 1-9: 0

## 2024-02-01 NOTE — PROGRESS NOTES
[facility]  [address] [place] [address] [phone]           Date of visit: 2/4/2024   Subjective:   ER follow up: R shoulder pain. lidocaine patches, arthritis strength tylenol, these are not helping. Chronic pain that just was not getting better so wanted to seek medical attention.  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 Got relief that day but then nothing after    TIIDM: Metformin 750 ER BID, Glimepiride 4mg daily  HTN: Amlodipine 10mg, Chlorthalidone 25mg, Losartan 100mg, Lopressor 25  Asthma: Abuterol, singulair 10mg  Pepcid as needed    Patient Active Problem List    Diagnosis Date Noted    Shoulder impingement 09/22/2023    Type 2 diabetes mellitus with hyperglycemia 08/18/2023    Chronic renal disease, stage III ([facility]) 07/29/2022    Environmental and seasonal allergies 03/01/2022    Moderate persistent asthma without complication 03/01/2022    Type 2 diabetes mellitus with chronic kidney disease ([facility]) 03/01/2022    Diabetes mellitus type II, non insulin dependent ([facility]) 09/23/2021    Obesity, Class III, BMI 40-49.9 (morbid obesity) ([facility]) 05/01/2018    Gout 03/01/2016    OA (osteoarthritis) 12/30/2015    H/O colonoscopy 10/01/2015    RAD (reactive airway disease) 09/02/2015    Facial droop 09/02/2015    S/P MARI-BSO 09/02/2015    Essential hypertension 09/02/2015    Diabetes mellitus without complication ([facility]) 09/02/2015     Current Outpatient Medications   Medication Sig Dispense Refill    lidocaine (HM LIDOCAINE PATCH) 4 % external patch Place 1 patch onto the skin daily      acetaminophen (TYLENOL 8 HOUR ARTHRITIS PAIN) 650 MG extended release tablet Take 1 tablet by mouth every 8 hours as needed for Pain      meloxicam (MOBIC) 7.5

## 2024-02-01 NOTE — PROGRESS NOTES
Pt roomed by first and last name and .    Chief Complaint   Patient presents with    Follow-Up from Hospital    Shoulder Pain     right        Vitals:    24 0935   BP: (!) 140/70   Pulse: 65   Resp: 17   Temp: 97.3 °F (36.3 °C)   TempSrc: Temporal   SpO2: 98%   Weight: 101.2 kg (223 lb)   Height: 1.626 m (5' 4\")        1. Have you been to the ER, urgent care clinic since your last visit?  Hospitalized since your last visit? Yes.  [facility] for shoulder pain.    2. Have you seen or consulted any other health care providers outside of the [facility] since your last visit?  Include any pap smears or colon screening. No

## 2024-02-01 NOTE — PATIENT INSTRUCTIONS
Continue to use anti-inflammatories such as tylenol as needed  Can take mobiq once per day as needed  Continue to do home exercise therapy!

## 2024-02-15 RX ORDER — GLIMEPIRIDE 4 MG/1
TABLET ORAL
Qty: 180 TABLET | Refills: 3 | Status: SHIPPED | OUTPATIENT
Start: 2024-02-15

## 2024-02-15 NOTE — TELEPHONE ENCOUNTER
Pt called to check the status of this Rx request. She stated that she has been out of the medication for 2 days and need it to be filled asap.    Thank you

## 2024-02-15 NOTE — TELEPHONE ENCOUNTER
Medication Refill Request    Precious Taylor is requesting a refill of the following medication(s):   Requested Prescriptions     Pending Prescriptions Disp Refills    glimepiride (AMARYL) 4 MG tablet [Pharmacy Med Name: GLIMEPIRIDE 4 MG TAB[*]] 180 tablet 3     Sig: TAKE TWO TABLETS BY MOUTH EVERY MORNING        Listed PCP is Karis Do,    Last provider to prescribe medication: Dr. Do  Last Date of Medication Prescribed: 12/22/2023   Date of Last Office Visit at [facility]: 02/01/2024 for hosp f/u  FUTURE APPOINTMENT:   Future Appointments   Date Time Provider Department Center   3/1/2024 11:00 AM Karis Do, DO [facility] BS AMB       Please send refill to:    [id number] - [place], [place] - [address] [place] [address] -  [phone] - F [phone] [address] [place] [address]  Phone: [phone] Fax: [phone]      Please review request and approve or deny with recommendations.

## 2024-03-08 ENCOUNTER — OFFICE VISIT (OUTPATIENT)
Age: 69
End: 2024-03-08
Payer: MEDICARE

## 2024-03-08 VITALS
BODY MASS INDEX: 37.83 KG/M2 | OXYGEN SATURATION: 99 % | SYSTOLIC BLOOD PRESSURE: 124 MMHG | DIASTOLIC BLOOD PRESSURE: 66 MMHG | HEART RATE: 60 BPM | RESPIRATION RATE: 16 BRPM | WEIGHT: 220.38 LBS

## 2024-03-08 DIAGNOSIS — Z13.220 SCREENING CHOLESTEROL LEVEL: ICD-10-CM

## 2024-03-08 DIAGNOSIS — E11.65 TYPE 2 DIABETES MELLITUS WITH HYPERGLYCEMIA, WITHOUT LONG-TERM CURRENT USE OF INSULIN (HCC): Primary | ICD-10-CM

## 2024-03-08 DIAGNOSIS — M25.811 IMPINGEMENT OF RIGHT SHOULDER: ICD-10-CM

## 2024-03-08 LAB
ANION GAP SERPL CALC-SCNC: 7 MMOL/L (ref 5–15)
BUN SERPL-MCNC: 23 MG/DL (ref 6–20)
BUN/CREAT SERPL: 26 (ref 12–20)
CALCIUM SERPL-MCNC: 9.6 MG/DL (ref 8.5–10.1)
CHLORIDE SERPL-SCNC: 103 MMOL/L (ref 97–108)
CHOLEST SERPL-MCNC: 232 MG/DL
CO2 SERPL-SCNC: 31 MMOL/L (ref 21–32)
CREAT SERPL-MCNC: 0.9 MG/DL (ref 0.55–1.02)
EST. AVERAGE GLUCOSE BLD GHB EST-MCNC: 174 MG/DL
GLUCOSE SERPL-MCNC: 134 MG/DL (ref 65–100)
HBA1C MFR BLD: 7.7 % (ref 4–5.6)
HDLC SERPL-MCNC: 127 MG/DL
HDLC SERPL: 1.8 (ref 0–5)
LDLC SERPL CALC-MCNC: 97.2 MG/DL (ref 0–100)
POTASSIUM SERPL-SCNC: 4.2 MMOL/L (ref 3.5–5.1)
SODIUM SERPL-SCNC: 141 MMOL/L (ref 136–145)
TRIGL SERPL-MCNC: 39 MG/DL
VLDLC SERPL CALC-MCNC: 7.8 MG/DL

## 2024-03-08 PROCEDURE — 99214 OFFICE O/P EST MOD 30 MIN: CPT | Performed by: STUDENT IN AN ORGANIZED HEALTH CARE EDUCATION/TRAINING PROGRAM

## 2024-03-08 RX ORDER — BETAMETHASONE SODIUM PHOSPHATE AND BETAMETHASONE ACETATE 3; 3 MG/ML; MG/ML
12 INJECTION, SUSPENSION INTRA-ARTICULAR; INTRALESIONAL; INTRAMUSCULAR; SOFT TISSUE ONCE
Status: SHIPPED | OUTPATIENT
Start: 2024-03-08

## 2024-03-08 RX ORDER — LIDOCAINE HYDROCHLORIDE 10 MG/ML
3 INJECTION, SOLUTION INFILTRATION; PERINEURAL ONCE
Status: SHIPPED | OUTPATIENT
Start: 2024-03-08

## 2024-03-08 RX ORDER — METFORMIN HYDROCHLORIDE 500 MG/1
500 TABLET, EXTENDED RELEASE ORAL
Qty: 30 TABLET | Refills: 0 | Status: SHIPPED | OUTPATIENT
Start: 2024-03-08

## 2024-03-08 ASSESSMENT — PATIENT HEALTH QUESTIONNAIRE - PHQ9
SUM OF ALL RESPONSES TO PHQ9 QUESTIONS 1 & 2: 0
SUM OF ALL RESPONSES TO PHQ QUESTIONS 1-9: 0
SUM OF ALL RESPONSES TO PHQ QUESTIONS 1-9: 0
2. FEELING DOWN, DEPRESSED OR HOPELESS: 0
1. LITTLE INTEREST OR PLEASURE IN DOING THINGS: 0
SUM OF ALL RESPONSES TO PHQ QUESTIONS 1-9: 0
SUM OF ALL RESPONSES TO PHQ QUESTIONS 1-9: 0

## 2024-03-08 NOTE — PROGRESS NOTES
Needs a diabetic eye Doctor. Her eyes are running and very water.    Chief Complaint   Patient presents with    Diabetes     Vitals:    03/08/24 1107   BP: 124/66   Pulse: 60   Resp: 16   SpO2: 99%

## 2024-03-08 NOTE — PROGRESS NOTES
[facility]   [facility]      Chief Complaint:   Chief Complaint   Patient presents with    Diabetes       HPI:  Precious Taylor is a 69 y.o. female who presents for DM f/u.     - Medication Regimen: Glipizide 4 mg, metformin 750 mg XR  - Pt does not take Metformin much due to side effects (upset stomach)  - Does not check blood sugars at home  - Nutrition/Diet: eating a lot of chips lately  - Exercise: no regimen  - Denies foot pain and/or paresthesias in feet  - Last saw eye doctor summer of 2023, will be due in summer 2024    - Last Hgb A1C:   Hemoglobin A1C   Date Value Ref Range Status   08/18/2023 8.4 (H) 4.0 - 5.6 % Final     Comment:     NEW METHOD  PLEASE NOTE NEW REFERENCE RANGE  (NOTE)  HbA1C Interpretive Ranges  <5.7              Normal  5.7 - 6.4         Consider Prediabetes  >6.5              Consider Diabetes       - Last lipid panel:   Lab Results   Component Value Date    CHOL 212 (H) 04/20/2023    TRIG 42 04/20/2023     04/20/2023    LDLCALC 71.6 04/20/2023    CHOLHDLRATIO 1.6 04/20/2023       - Flu shot: did not receive this season  - PCV: UTD  - COVID: had primary series   - Tdap: UTD    Past Medical History:   Diagnosis Date    Asthma     Diabetes (HCC)     Hypertension        Current Outpatient Medications:     metFORMIN (GLUCOPHAGE-XR) 500 MG extended release tablet, Take 1 tablet by mouth daily (with breakfast), Disp: 30 tablet, Rfl: 0    glimepiride (AMARYL) 4 MG tablet, TAKE TWO TABLETS BY MOUTH EVERY MORNING, Disp: 180 tablet, Rfl: 3    lidocaine (HM LIDOCAINE PATCH) 4 % external patch, Place 1 patch onto the skin daily, Disp: , Rfl:     acetaminophen (TYLENOL 8 HOUR ARTHRITIS PAIN) 650 MG extended release tablet, Take 1 tablet by mouth every 8 hours as needed for Pain, Disp: , Rfl:     meloxicam (MOBIC) 7.5 MG tablet, Take 1 tablet by mouth daily, Disp: 30 tablet, Rfl: 0    amLODIPine (NORVASC) 10 MG

## 2024-03-08 NOTE — PATIENT INSTRUCTIONS
Start physical therapy for your shoulder.    I will call you with lab results and discuss any needed medication changes.

## 2024-03-22 DIAGNOSIS — E11.65 TYPE 2 DIABETES MELLITUS WITH HYPERGLYCEMIA, WITHOUT LONG-TERM CURRENT USE OF INSULIN (HCC): ICD-10-CM

## 2024-03-29 RX ORDER — METFORMIN HYDROCHLORIDE 500 MG/1
TABLET, EXTENDED RELEASE ORAL
Qty: 30 TABLET | Refills: 0 | Status: SHIPPED | OUTPATIENT
Start: 2024-03-29

## 2024-04-07 PROBLEM — Z13.220 SCREENING CHOLESTEROL LEVEL: Status: RESOLVED | Noted: 2024-03-08 | Resolved: 2024-04-07

## 2024-05-08 DIAGNOSIS — I10 ESSENTIAL (PRIMARY) HYPERTENSION: ICD-10-CM

## 2024-05-10 RX ORDER — LOSARTAN POTASSIUM 100 MG/1
100 TABLET ORAL DAILY
Qty: 90 TABLET | Refills: 3 | Status: SHIPPED | OUTPATIENT
Start: 2024-05-10

## 2024-06-11 ENCOUNTER — ENROLLMENT (OUTPATIENT)
Dept: PHARMACY | Facility: CLINIC | Age: 69
End: 2024-06-11

## 2024-06-20 DIAGNOSIS — J45.20 MILD INTERMITTENT ASTHMA WITHOUT COMPLICATION: ICD-10-CM

## 2024-06-20 RX ORDER — ALBUTEROL SULFATE 90 UG/1
AEROSOL, METERED RESPIRATORY (INHALATION)
Qty: 17 G | Refills: 5 | Status: SHIPPED | OUTPATIENT
Start: 2024-06-20

## 2024-07-06 DIAGNOSIS — I10 ESSENTIAL (PRIMARY) HYPERTENSION: ICD-10-CM

## 2024-08-07 DIAGNOSIS — I10 ESSENTIAL (PRIMARY) HYPERTENSION: ICD-10-CM

## 2024-08-09 DIAGNOSIS — I10 ESSENTIAL (PRIMARY) HYPERTENSION: ICD-10-CM

## 2024-08-09 RX ORDER — LOSARTAN POTASSIUM 100 MG/1
100 TABLET ORAL DAILY
Qty: 90 TABLET | Refills: 3 | Status: SHIPPED | OUTPATIENT
Start: 2024-08-09

## 2024-08-12 RX ORDER — LOSARTAN POTASSIUM 100 MG/1
100 TABLET ORAL DAILY
Qty: 90 TABLET | Refills: 3 | Status: SHIPPED | OUTPATIENT
Start: 2024-08-12

## 2024-09-27 ENCOUNTER — OFFICE VISIT (OUTPATIENT)
Age: 69
End: 2024-09-27
Payer: MEDICARE

## 2024-09-27 VITALS
HEIGHT: 63 IN | RESPIRATION RATE: 18 BRPM | DIASTOLIC BLOOD PRESSURE: 66 MMHG | WEIGHT: 226.6 LBS | OXYGEN SATURATION: 98 % | TEMPERATURE: 98.3 F | HEART RATE: 70 BPM | SYSTOLIC BLOOD PRESSURE: 138 MMHG | BODY MASS INDEX: 40.15 KG/M2

## 2024-09-27 DIAGNOSIS — J45.20 MILD INTERMITTENT ASTHMA WITHOUT COMPLICATION: ICD-10-CM

## 2024-09-27 DIAGNOSIS — Z12.11 COLON CANCER SCREENING: ICD-10-CM

## 2024-09-27 DIAGNOSIS — I10 ESSENTIAL (PRIMARY) HYPERTENSION: Primary | ICD-10-CM

## 2024-09-27 DIAGNOSIS — E11.9 DIABETES MELLITUS TYPE II, NON INSULIN DEPENDENT (HCC): ICD-10-CM

## 2024-09-27 DIAGNOSIS — E11.65 TYPE 2 DIABETES MELLITUS WITH HYPERGLYCEMIA, WITHOUT LONG-TERM CURRENT USE OF INSULIN (HCC): ICD-10-CM

## 2024-09-27 PROCEDURE — 99213 OFFICE O/P EST LOW 20 MIN: CPT | Performed by: STUDENT IN AN ORGANIZED HEALTH CARE EDUCATION/TRAINING PROGRAM

## 2024-09-27 RX ORDER — METFORMIN HCL 500 MG
500 TABLET, EXTENDED RELEASE 24 HR ORAL
Qty: 30 TABLET | Refills: 2 | Status: SHIPPED | OUTPATIENT
Start: 2024-09-27

## 2024-09-27 RX ORDER — FLUTICASONE PROPIONATE AND SALMETEROL 100; 50 UG/1; UG/1
1 POWDER RESPIRATORY (INHALATION) EVERY 12 HOURS
Qty: 60 EACH | Refills: 2 | Status: SHIPPED | OUTPATIENT
Start: 2024-09-27 | End: 2024-12-26

## 2024-09-27 RX ORDER — CHLORTHALIDONE 25 MG/1
25 TABLET ORAL DAILY
Qty: 90 TABLET | Refills: 1 | Status: SHIPPED | OUTPATIENT
Start: 2024-09-27

## 2024-10-04 ENCOUNTER — OFFICE VISIT (OUTPATIENT)
Age: 69
End: 2024-10-04
Payer: MEDICARE

## 2024-10-04 VITALS
RESPIRATION RATE: 18 BRPM | TEMPERATURE: 98.3 F | HEIGHT: 64 IN | BODY MASS INDEX: 39.34 KG/M2 | WEIGHT: 230.4 LBS | OXYGEN SATURATION: 95 % | DIASTOLIC BLOOD PRESSURE: 62 MMHG | SYSTOLIC BLOOD PRESSURE: 147 MMHG | HEART RATE: 75 BPM

## 2024-10-04 DIAGNOSIS — Z23 NEED FOR INFLUENZA VACCINATION: ICD-10-CM

## 2024-10-04 DIAGNOSIS — J45.40 MODERATE PERSISTENT ASTHMA WITHOUT COMPLICATION: Primary | ICD-10-CM

## 2024-10-04 DIAGNOSIS — E11.22 TYPE 2 DIABETES MELLITUS WITH STAGE 3A CHRONIC KIDNEY DISEASE, WITHOUT LONG-TERM CURRENT USE OF INSULIN (HCC): ICD-10-CM

## 2024-10-04 DIAGNOSIS — Z12.31 BREAST CANCER SCREENING BY MAMMOGRAM: ICD-10-CM

## 2024-10-04 DIAGNOSIS — I10 ESSENTIAL HYPERTENSION: ICD-10-CM

## 2024-10-04 DIAGNOSIS — N18.31 TYPE 2 DIABETES MELLITUS WITH STAGE 3A CHRONIC KIDNEY DISEASE, WITHOUT LONG-TERM CURRENT USE OF INSULIN (HCC): ICD-10-CM

## 2024-10-04 LAB
CREAT UR-MCNC: 107 MG/DL
MICROALBUMIN UR-MCNC: 0.52 MG/DL
MICROALBUMIN/CREAT UR-RTO: 5 MG/G (ref 0–30)
T4 FREE SERPL-MCNC: 1 NG/DL (ref 0.8–1.5)
TSH SERPL DL<=0.05 MIU/L-ACNC: 2.13 UIU/ML (ref 0.36–3.74)

## 2024-10-04 PROCEDURE — 99213 OFFICE O/P EST LOW 20 MIN: CPT | Performed by: STUDENT IN AN ORGANIZED HEALTH CARE EDUCATION/TRAINING PROGRAM

## 2024-10-04 PROCEDURE — 90653 IIV ADJUVANT VACCINE IM: CPT | Performed by: STUDENT IN AN ORGANIZED HEALTH CARE EDUCATION/TRAINING PROGRAM

## 2024-10-04 RX ORDER — ROSUVASTATIN CALCIUM 20 MG/1
20 TABLET, COATED ORAL DAILY
Qty: 90 TABLET | Refills: 1 | Status: SHIPPED | OUTPATIENT
Start: 2024-10-04

## 2024-10-04 SDOH — ECONOMIC STABILITY: INCOME INSECURITY: HOW HARD IS IT FOR YOU TO PAY FOR THE VERY BASICS LIKE FOOD, HOUSING, MEDICAL CARE, AND HEATING?: NOT HARD AT ALL

## 2024-10-04 SDOH — ECONOMIC STABILITY: FOOD INSECURITY: WITHIN THE PAST 12 MONTHS, YOU WORRIED THAT YOUR FOOD WOULD RUN OUT BEFORE YOU GOT MONEY TO BUY MORE.: NEVER TRUE

## 2024-10-04 SDOH — ECONOMIC STABILITY: FOOD INSECURITY: WITHIN THE PAST 12 MONTHS, THE FOOD YOU BOUGHT JUST DIDN'T LAST AND YOU DIDN'T HAVE MONEY TO GET MORE.: NEVER TRUE

## 2024-10-04 NOTE — PROGRESS NOTES
Pt here today for a follow up. Pt states she is feeling better but she is still wheezing a little.     Identified pt with two pt identifiers(name and ). Reviewed record in preparation for visit and have obtained necessary documentation.  Chief Complaint   Patient presents with    Follow-up        Vitals:    10/04/24 0808   BP: (!) 147/62   Site: Right Upper Arm   Position: Sitting   Cuff Size: Large Adult   Pulse: 75   Resp: 18   Temp: 98.3 °F (36.8 °C)   TempSrc: Oral   SpO2: 95%   Weight: 104.5 kg (230 lb 6.4 oz)   Height: 1.626 m (5' 4\")         Coordination of Care Questionnaire:  :     \"Have you been to the ER, urgent care clinic since your last visit?  Hospitalized since your last visit?\"    NO    “Have you seen or consulted any other health care providers outside of [facility] since your last visit?”    NO    Have you had a mammogram?”   NO    Date of last Mammogram: 2020         “Have you had a colorectal cancer screening such as a colonoscopy/FIT/Cologuard?    NO    No colonoscopy on file  No cologuard on file  No FIT/FOBT on file   No flexible sigmoidoscopy on file         Click Here for Release of Records Request   
evaluations. Will provide referral for podiatry, ophthalmology. Will obtain repeat A1c, urine microalbumin/creatinine. ASCVD risk 36.4%. Will prescribe Rosuvastatin 20mg QD.     #Need for flu vaccination  - Will administer Fluad today. Recommend patient go to pharmacy to receive Shingles and RSV vaccinations.       #Breast cancer screening  - Due. Screening mammogram ordered.     Bone density scan in the in past with [facility], states unsure of results. Advised to have results faxed over to the office.      Case discussed with the attending (Dr. Corrigan) who agrees with the plan

## 2024-10-05 LAB
ALBUMIN SERPL-MCNC: 3.5 G/DL (ref 3.5–5)
ALBUMIN/GLOB SERPL: 1 (ref 1.1–2.2)
ALP SERPL-CCNC: 98 U/L (ref 45–117)
ALT SERPL-CCNC: 11 U/L (ref 12–78)
ANION GAP SERPL CALC-SCNC: 1 MMOL/L (ref 2–12)
AST SERPL-CCNC: 6 U/L (ref 15–37)
BASOPHILS # BLD: 0 K/UL (ref 0–0.1)
BASOPHILS NFR BLD: 0 % (ref 0–1)
BILIRUB SERPL-MCNC: 0.3 MG/DL (ref 0.2–1)
BUN SERPL-MCNC: 20 MG/DL (ref 6–20)
BUN/CREAT SERPL: 23 (ref 12–20)
CALCIUM SERPL-MCNC: 9.6 MG/DL (ref 8.5–10.1)
CHLORIDE SERPL-SCNC: 101 MMOL/L (ref 97–108)
CHOLEST SERPL-MCNC: 223 MG/DL
CO2 SERPL-SCNC: 33 MMOL/L (ref 21–32)
CREAT SERPL-MCNC: 0.87 MG/DL (ref 0.55–1.02)
DIFFERENTIAL METHOD BLD: ABNORMAL
EOSINOPHIL # BLD: 0.1 K/UL (ref 0–0.4)
EOSINOPHIL NFR BLD: 1 % (ref 0–7)
ERYTHROCYTE [DISTWIDTH] IN BLOOD BY AUTOMATED COUNT: 15.7 % (ref 11.5–14.5)
EST. AVERAGE GLUCOSE BLD GHB EST-MCNC: 177 MG/DL
GLOBULIN SER CALC-MCNC: 3.6 G/DL (ref 2–4)
GLUCOSE SERPL-MCNC: 150 MG/DL (ref 65–100)
HBA1C MFR BLD: 7.8 % (ref 4–5.6)
HCT VFR BLD AUTO: 32.6 % (ref 35–47)
HDLC SERPL-MCNC: 127 MG/DL
HDLC SERPL: 1.8 (ref 0–5)
HGB BLD-MCNC: 9.3 G/DL (ref 11.5–16)
IMM GRANULOCYTES # BLD AUTO: 0 K/UL (ref 0–0.04)
IMM GRANULOCYTES NFR BLD AUTO: 0 % (ref 0–0.5)
LDLC SERPL CALC-MCNC: 87.4 MG/DL (ref 0–100)
LYMPHOCYTES # BLD: 1.1 K/UL (ref 0.8–3.5)
LYMPHOCYTES NFR BLD: 20 % (ref 12–49)
MCH RBC QN AUTO: 23.5 PG (ref 26–34)
MCHC RBC AUTO-ENTMCNC: 28.5 G/DL (ref 30–36.5)
MCV RBC AUTO: 82.3 FL (ref 80–99)
MONOCYTES # BLD: 0.3 K/UL (ref 0–1)
MONOCYTES NFR BLD: 6 % (ref 5–13)
NEUTS SEG # BLD: 4.2 K/UL (ref 1.8–8)
NEUTS SEG NFR BLD: 73 % (ref 32–75)
NRBC # BLD: 0 K/UL (ref 0–0.01)
NRBC BLD-RTO: 0 PER 100 WBC
PLATELET # BLD AUTO: 360 K/UL (ref 150–400)
PLATELET COMMENT: ABNORMAL
PMV BLD AUTO: 10.9 FL (ref 8.9–12.9)
POTASSIUM SERPL-SCNC: 4.3 MMOL/L (ref 3.5–5.1)
PROT SERPL-MCNC: 7.1 G/DL (ref 6.4–8.2)
RBC # BLD AUTO: 3.96 M/UL (ref 3.8–5.2)
RBC MORPH BLD: ABNORMAL
RBC MORPH BLD: ABNORMAL
SODIUM SERPL-SCNC: 135 MMOL/L (ref 136–145)
TRIGL SERPL-MCNC: 43 MG/DL
VLDLC SERPL CALC-MCNC: 8.6 MG/DL
WBC # BLD AUTO: 5.7 K/UL (ref 3.6–11)

## 2024-10-07 DIAGNOSIS — I10 ESSENTIAL (PRIMARY) HYPERTENSION: ICD-10-CM

## 2024-10-07 RX ORDER — AMLODIPINE BESYLATE 10 MG/1
10 TABLET ORAL DAILY
Qty: 90 TABLET | Refills: 1 | Status: SHIPPED | OUTPATIENT
Start: 2024-10-07

## 2024-10-22 ENCOUNTER — TELEPHONE (OUTPATIENT)
Age: 69
End: 2024-10-22

## 2024-10-22 NOTE — TELEPHONE ENCOUNTER
----- Message from MEHRAN CASTELLON MA sent at 10/22/2024  1:13 PM EDT -----  Good afternoon,    Can we pls reach out to pt and schedule a f/u appt for her yo see Dr. Smith.    Thank you   Emily  ----- Message -----  From: Vahid Smith DO  Sent: 10/21/2024   5:58 PM EDT  To: Emily Bruno MA    Please have the patient make a follow up appointment with me in 2-3 weeks. Thank you.

## 2024-11-03 DIAGNOSIS — I10 ESSENTIAL (PRIMARY) HYPERTENSION: ICD-10-CM

## 2024-11-04 RX ORDER — METOPROLOL TARTRATE 25 MG/1
25 TABLET, FILM COATED ORAL 2 TIMES DAILY
Qty: 60 TABLET | Refills: 2 | Status: SHIPPED | OUTPATIENT
Start: 2024-11-04

## 2024-12-20 ENCOUNTER — OFFICE VISIT (OUTPATIENT)
Age: 69
End: 2024-12-20
Payer: MEDICARE

## 2024-12-20 VITALS
DIASTOLIC BLOOD PRESSURE: 69 MMHG | TEMPERATURE: 98.3 F | HEIGHT: 64 IN | RESPIRATION RATE: 18 BRPM | HEART RATE: 77 BPM | WEIGHT: 227.2 LBS | OXYGEN SATURATION: 97 % | SYSTOLIC BLOOD PRESSURE: 127 MMHG | BODY MASS INDEX: 38.79 KG/M2

## 2024-12-20 DIAGNOSIS — Z13.820 ENCOUNTER FOR OSTEOPOROSIS SCREENING IN ASYMPTOMATIC POSTMENOPAUSAL PATIENT: ICD-10-CM

## 2024-12-20 DIAGNOSIS — E66.01 MORBID (SEVERE) OBESITY DUE TO EXCESS CALORIES: ICD-10-CM

## 2024-12-20 DIAGNOSIS — Z00.00 MEDICARE ANNUAL WELLNESS VISIT, SUBSEQUENT: Primary | ICD-10-CM

## 2024-12-20 DIAGNOSIS — J45.40 MODERATE PERSISTENT ASTHMA WITHOUT COMPLICATION: ICD-10-CM

## 2024-12-20 DIAGNOSIS — Z12.11 SCREENING FOR COLON CANCER: ICD-10-CM

## 2024-12-20 DIAGNOSIS — E11.22 TYPE 2 DIABETES MELLITUS WITH CHRONIC KIDNEY DISEASE, WITHOUT LONG-TERM CURRENT USE OF INSULIN, UNSPECIFIED CKD STAGE (HCC): ICD-10-CM

## 2024-12-20 DIAGNOSIS — Z78.0 ENCOUNTER FOR OSTEOPOROSIS SCREENING IN ASYMPTOMATIC POSTMENOPAUSAL PATIENT: ICD-10-CM

## 2024-12-20 DIAGNOSIS — I10 ESSENTIAL (PRIMARY) HYPERTENSION: ICD-10-CM

## 2024-12-20 LAB — GLUCOSE, POC: 159 MG/DL

## 2024-12-20 PROCEDURE — 82962 GLUCOSE BLOOD TEST: CPT | Performed by: STUDENT IN AN ORGANIZED HEALTH CARE EDUCATION/TRAINING PROGRAM

## 2024-12-20 RX ORDER — BENZONATATE 100 MG/1
100 CAPSULE ORAL 3 TIMES DAILY PRN
Qty: 30 CAPSULE | Refills: 0 | Status: SHIPPED | OUTPATIENT
Start: 2024-12-20 | End: 2024-12-30

## 2024-12-20 RX ORDER — PREDNISONE 10 MG/1
10 TABLET ORAL DAILY
Qty: 10 TABLET | Refills: 0 | Status: SHIPPED | OUTPATIENT
Start: 2024-12-20 | End: 2024-12-30

## 2024-12-20 ASSESSMENT — PATIENT HEALTH QUESTIONNAIRE - PHQ9
2. FEELING DOWN, DEPRESSED OR HOPELESS: NOT AT ALL
SUM OF ALL RESPONSES TO PHQ QUESTIONS 1-9: 0
SUM OF ALL RESPONSES TO PHQ9 QUESTIONS 1 & 2: 0
SUM OF ALL RESPONSES TO PHQ QUESTIONS 1-9: 0
1. LITTLE INTEREST OR PLEASURE IN DOING THINGS: NOT AT ALL

## 2024-12-20 ASSESSMENT — LIFESTYLE VARIABLES
HOW OFTEN DO YOU HAVE A DRINK CONTAINING ALCOHOL: NEVER
HOW MANY STANDARD DRINKS CONTAINING ALCOHOL DO YOU HAVE ON A TYPICAL DAY: PATIENT DOES NOT DRINK

## 2024-12-20 NOTE — PATIENT INSTRUCTIONS
the questions below to help you get started.  Who do you want to make decisions about your medical care if you are not able to?  What life-support measures do you want if you have a serious illness that gets worse over time or can't be cured?  What are you most afraid of that might happen? (Maybe you're afraid of having pain, losing your independence, or being kept alive by machines.)  Where would you prefer to die? (Your home? A hospital? A nursing home?)  Do you want to donate your organs when you die?  Do you want certain Cong[employer] practices performed before you die?  When should you call for help?  Be sure to contact your doctor if you have any questions.  Where can you learn more?  Go to https://www.[employer].net/patientEd and enter R264 to learn more about \"Advance Directives: Care Instructions.\"  Current as of: November 16, 2023  Content Version: 14.2  © 2024 [employer].   Care instructions adapted under license by [employer]. If you have questions about a medical condition or this instruction, always ask your healthcare professional. [employer] disclaims any warranty or liability for your use of this information.           A Healthy Heart: Care Instructions  Overview     Coronary artery disease, also called heart disease, occurs when a substance called plaque builds up in the vessels that supply oxygen-rich blood to your heart muscle. This can narrow the blood vessels and reduce blood flow. A heart attack happens when blood flow is completely blocked. A high-fat diet, smoking, and other factors increase the risk of heart disease.  Your doctor has found that you have a chance of having heart disease. A heart-healthy lifestyle can help keep your heart healthy and prevent heart disease. This lifestyle includes eating healthy, being active, staying at a weight that's healthy for you, and not smoking or using tobacco. It also includes taking medicines as directed, managing other

## 2024-12-20 NOTE — PROGRESS NOTES
[facility] Family [facility] Res[facility]   [facility]      Chief Complaint:   Chief Complaint   Patient presents with    Medicare AWV       SUBJECTIVE:  Precious Taylor is a 69 y.o. female who presents for medicare annual wellness. Rhinorrhea, cough, chest congestion x1 week. No sick contacts.     HTN: Has not been checking BP at home. States compliance with BP meds.     DM: Has not checked finger stick at home. States compliance with medications.     Asthma: On advair and proventil. States symptoms well controlled on these medications. Recently developed wheezing associated with URI for the past 1 week.     Health maintenance:  - Pap smear: States years ago prior to total hysterectomy in 2007.  States no abnormalities on previously.   - Mammogram: States done 2 years ago with no abnormalities per patient.   - Colonoscopy: States more than 10 years ago. Previously referred.   - Dexa scan: States done previously at [facility], had some abnormality on her right hip.   - Diabetic foot exam: Has not had   - Diabetic eye exam: states done earlier this year with Dr. Le, no abnormalities per patient.      Immunizations:  - Flu: 10/4/24  - Tdap: 9/8/2015  - Shingles:  Has not received  - Pneumococcal:  Received in 2021.       PMHx:  Past Medical History:   Diagnosis Date    Asthma     Diabetes (HCC)     Hypertension          Smoker:  Social History     Tobacco Use   Smoking Status Never   Smokeless Tobacco Never       ETOH:   Social History     Substance and Sexual Activity   Alcohol Use No       FH:   Family History   Problem Relation Age of Onset    Diabetes Father     Diabetes Sister        ROS:  General/Constitutional:   No headache, fever, fatigue, weight loss or weight gain       Eyes:   No visual changes      Ears:    No changes      Nose: rhinorrhea   Neck:   No pain, or limited movement     Cardiac:    No chest pain      Respiratory:   Cough, wheezing. No  No

## 2024-12-20 NOTE — PROGRESS NOTES
Identified pt with two pt identifiers(name and ). Reviewed record in preparation for visit and have obtained necessary documentation.  Chief Complaint   Patient presents with    Medicare AWV        Vitals:    24 0910 24 0953   BP: (!) 162/69 127/69   Site: Right Upper Arm Left Upper Arm   Position: Sitting Sitting   Cuff Size: Large Adult Large Adult   Pulse: 77    Resp: 18    Temp: 98.3 °F (36.8 °C)    TempSrc: Oral    SpO2: 97%    Weight: 103.1 kg (227 lb 3.2 oz)    Height: 1.626 m (5' 4\")          Coordination of Care Questionnaire:  :     \"Have you been to the ER, urgent care clinic since your last visit?  Hospitalized since your last visit?\"    NO    “Have you seen or consulted any other health care providers outside of [facility] since your last visit?”    NO    Have you had a mammogram?”   NO    Date of last Mammogram: 2020         “Have you had a colorectal cancer screening such as a colonoscopy/FIT/Cologuard?    NO    No colonoscopy on file  No cologuard on file  No FIT/FOBT on file   No flexible sigmoidoscopy on file         Click Here for Release of Records Request

## 2025-02-26 RX ORDER — GLIMEPIRIDE 4 MG/1
TABLET ORAL
Qty: 180 TABLET | Refills: 3 | Status: SHIPPED | OUTPATIENT
Start: 2025-02-26

## 2025-03-21 ENCOUNTER — OFFICE VISIT (OUTPATIENT)
Age: 70
End: 2025-03-21
Payer: MEDICARE

## 2025-03-21 ENCOUNTER — ANCILLARY PROCEDURE (OUTPATIENT)
Age: 70
End: 2025-03-21
Payer: MEDICARE

## 2025-03-21 VITALS
DIASTOLIC BLOOD PRESSURE: 70 MMHG | SYSTOLIC BLOOD PRESSURE: 150 MMHG | HEART RATE: 70 BPM | BODY MASS INDEX: 39.51 KG/M2 | RESPIRATION RATE: 16 BRPM | TEMPERATURE: 98.8 F | WEIGHT: 230.2 LBS | OXYGEN SATURATION: 98 %

## 2025-03-21 DIAGNOSIS — Z11.59 NEED FOR HEPATITIS C SCREENING TEST: ICD-10-CM

## 2025-03-21 DIAGNOSIS — Z12.11 ENCOUNTER FOR SCREENING COLONOSCOPY: ICD-10-CM

## 2025-03-21 DIAGNOSIS — E11.22 TYPE 2 DIABETES MELLITUS WITH CHRONIC KIDNEY DISEASE, WITHOUT LONG-TERM CURRENT USE OF INSULIN, UNSPECIFIED CKD STAGE (HCC): Primary | ICD-10-CM

## 2025-03-21 DIAGNOSIS — G89.29 CHRONIC PAIN OF RIGHT KNEE: ICD-10-CM

## 2025-03-21 DIAGNOSIS — M25.561 CHRONIC PAIN OF RIGHT KNEE: ICD-10-CM

## 2025-03-21 DIAGNOSIS — Z12.31 ENCOUNTER FOR SCREENING MAMMOGRAM FOR BREAST CANCER: ICD-10-CM

## 2025-03-21 DIAGNOSIS — Z13.820 SCREENING FOR OSTEOPOROSIS: ICD-10-CM

## 2025-03-21 DIAGNOSIS — I10 ESSENTIAL (PRIMARY) HYPERTENSION: ICD-10-CM

## 2025-03-21 LAB
ALBUMIN SERPL-MCNC: 3.4 G/DL (ref 3.5–5)
ALBUMIN/GLOB SERPL: 0.9 (ref 1.1–2.2)
ALP SERPL-CCNC: 92 U/L (ref 45–117)
ALT SERPL-CCNC: 14 U/L (ref 12–78)
ANION GAP SERPL CALC-SCNC: 5 MMOL/L (ref 2–12)
AST SERPL-CCNC: 16 U/L (ref 15–37)
BASOPHILS # BLD: 0.01 K/UL (ref 0–0.1)
BASOPHILS NFR BLD: 0.2 % (ref 0–1)
BILIRUB SERPL-MCNC: 0.4 MG/DL (ref 0.2–1)
BUN SERPL-MCNC: 13 MG/DL (ref 6–20)
BUN/CREAT SERPL: 17 (ref 12–20)
CALCIUM SERPL-MCNC: 9.5 MG/DL (ref 8.5–10.1)
CHLORIDE SERPL-SCNC: 103 MMOL/L (ref 97–108)
CO2 SERPL-SCNC: 28 MMOL/L (ref 21–32)
CREAT SERPL-MCNC: 0.76 MG/DL (ref 0.55–1.02)
CREAT UR-MCNC: 122 MG/DL
DIFFERENTIAL METHOD BLD: ABNORMAL
EOSINOPHIL # BLD: 0.03 K/UL (ref 0–0.4)
EOSINOPHIL NFR BLD: 0.7 % (ref 0–7)
ERYTHROCYTE [DISTWIDTH] IN BLOOD BY AUTOMATED COUNT: 18.2 % (ref 11.5–14.5)
EST. AVERAGE GLUCOSE BLD GHB EST-MCNC: 180 MG/DL
GLOBULIN SER CALC-MCNC: 3.9 G/DL (ref 2–4)
GLUCOSE SERPL-MCNC: 129 MG/DL (ref 65–100)
HBA1C MFR BLD: 7.9 % (ref 4–5.6)
HCT VFR BLD AUTO: 32.3 % (ref 35–47)
HCV AB SER IA-ACNC: 0.1 INDEX
HCV AB SERPL QL IA: NONREACTIVE
HGB BLD-MCNC: 8.8 G/DL (ref 11.5–16)
IMM GRANULOCYTES # BLD AUTO: 0.01 K/UL (ref 0–0.04)
IMM GRANULOCYTES NFR BLD AUTO: 0.2 % (ref 0–0.5)
LYMPHOCYTES # BLD: 1.27 K/UL (ref 0.8–3.5)
LYMPHOCYTES NFR BLD: 28.2 % (ref 12–49)
MCH RBC QN AUTO: 21.3 PG (ref 26–34)
MCHC RBC AUTO-ENTMCNC: 27.2 G/DL (ref 30–36.5)
MCV RBC AUTO: 78 FL (ref 80–99)
MICROALBUMIN UR-MCNC: 0.53 MG/DL
MICROALBUMIN/CREAT UR-RTO: 4 MG/G (ref 0–30)
MONOCYTES # BLD: 0.34 K/UL (ref 0–1)
MONOCYTES NFR BLD: 7.6 % (ref 5–13)
NEUTS SEG # BLD: 2.84 K/UL (ref 1.8–8)
NEUTS SEG NFR BLD: 63.1 % (ref 32–75)
NRBC # BLD: 0 K/UL (ref 0–0.01)
NRBC BLD-RTO: 0 PER 100 WBC
PLATELET # BLD AUTO: 373 K/UL (ref 150–400)
PMV BLD AUTO: 10.8 FL (ref 8.9–12.9)
POTASSIUM SERPL-SCNC: 4.4 MMOL/L (ref 3.5–5.1)
PROT SERPL-MCNC: 7.3 G/DL (ref 6.4–8.2)
RBC # BLD AUTO: 4.14 M/UL (ref 3.8–5.2)
RBC MORPH BLD: ABNORMAL
SODIUM SERPL-SCNC: 136 MMOL/L (ref 136–145)
WBC # BLD AUTO: 4.5 K/UL (ref 3.6–11)

## 2025-03-21 PROCEDURE — 73562 X-RAY EXAM OF KNEE 3: CPT

## 2025-03-21 PROCEDURE — 99214 OFFICE O/P EST MOD 30 MIN: CPT | Performed by: STUDENT IN AN ORGANIZED HEALTH CARE EDUCATION/TRAINING PROGRAM

## 2025-03-21 SDOH — ECONOMIC STABILITY: FOOD INSECURITY: WITHIN THE PAST 12 MONTHS, YOU WORRIED THAT YOUR FOOD WOULD RUN OUT BEFORE YOU GOT MONEY TO BUY MORE.: PATIENT DECLINED

## 2025-03-21 SDOH — ECONOMIC STABILITY: FOOD INSECURITY: WITHIN THE PAST 12 MONTHS, THE FOOD YOU BOUGHT JUST DIDN'T LAST AND YOU DIDN'T HAVE MONEY TO GET MORE.: PATIENT DECLINED

## 2025-03-21 ASSESSMENT — PATIENT HEALTH QUESTIONNAIRE - PHQ9
2. FEELING DOWN, DEPRESSED OR HOPELESS: NOT AT ALL
SUM OF ALL RESPONSES TO PHQ QUESTIONS 1-9: 0
1. LITTLE INTEREST OR PLEASURE IN DOING THINGS: NOT AT ALL
SUM OF ALL RESPONSES TO PHQ QUESTIONS 1-9: 0

## 2025-03-21 NOTE — PROGRESS NOTES
Room 1    Patient is accompanied by self.     I have received verbal consent from Precious Taylor to discuss any/all medical information while they are present in the room.    Identified pt with two pt identifiers(name and ). Reviewed record in preparation for visit and have obtained necessary documentation.  Chief Complaint   Patient presents with    Knee Pain     Location: Right        Health Maintenance Due   Topic    Hepatitis C screen     Colorectal Cancer Screen     Shingles vaccine (1 of 2)    DEXA (modify frequency per FRAX score)     Respiratory Syncytial Virus (RSV) Pregnant or age 60 yrs+ (1 - Risk 60-74 years 1-dose series)    Breast cancer screen     Diabetic foot exam     COVID-19 Vaccine (3 - 2024-25 season)    Annual Wellness Visit (Medicare Advantage)        Vitals:    25 0911 25 0915 25 1007   BP: (!) 157/76 (!) 145/73 (!) 150/70   BP Site: Left Upper Arm Left Upper Arm Left Upper Arm   Patient Position: Sitting Sitting Sitting   BP Cuff Size: Medium Adult Medium Adult Medium Adult   Pulse: 70     Resp: 16     Temp: 98.8 °F (37.1 °C)     TempSrc: Oral     SpO2: 98%     Weight: 104.4 kg (230 lb 3.2 oz)           Coordination of Care Questionnaire:       \"Have you been to the ER, urgent care clinic since your last visit?  Hospitalized since your last visit?\"    NO    “Have you seen or consulted any other health care providers outside of [facility] since your last visit?”    NO    Have you had a mammogram?”   NO    Date of last Mammogram: 2020         “Have you had a colorectal cancer screening such as a colonoscopy/FIT/Cologuard?    NO    No colonoscopy on file  No cologuard on file  No FIT/FOBT on file   No flexible sigmoidoscopy on file         Click Here for Release of Records Request   
missing some days of her medications. Encouraged medication compliance. Recommend reducing salt intake and exercising at a moderate-high intensity for at least 30 minutes per day for 5 days per week.     #Screening mammogram:  - Due. 2020, BIRADS 1. Previously ordered however due to life circumstances patient was not able to have it done. Mammogram ordered today.     #Screening for osteoporosis  - Patient states having DEXA scan performed years ago at [facility] and was told there was some abnormality on her right hip. DEXA scan ordered today.     #Screening colonoscopy   - Patient states having colonoscopy more than 10 years ago. Previously given referral however due to life circumstances she was not able to go to the referral. GI referral provided today.     #R knee pain:  - Chronic right knee pain for several years worsened a few weeks ago. Presentation consistent with right knee osteoarthritis. Discussed diagnosis, treatment, imaging and prognosis with the patient. Treatment options include oral/topical analgesics, CSI, applying heat/ice, home exercises and physical therapy. Patient chooses to proceed with oral analgesics at this time. Follow up in 6 weeks for evaluation of symptom progression.     Case discussed with the attending (Dr. Marques) who agrees with the plan.

## 2025-04-04 ENCOUNTER — OFFICE VISIT (OUTPATIENT)
Age: 70
End: 2025-04-04
Payer: MEDICARE

## 2025-04-04 VITALS
RESPIRATION RATE: 18 BRPM | WEIGHT: 228.4 LBS | HEART RATE: 70 BPM | HEIGHT: 64 IN | TEMPERATURE: 97.5 F | BODY MASS INDEX: 38.99 KG/M2 | SYSTOLIC BLOOD PRESSURE: 152 MMHG | DIASTOLIC BLOOD PRESSURE: 68 MMHG | OXYGEN SATURATION: 97 %

## 2025-04-04 DIAGNOSIS — I10 HYPERTENSION, UNSPECIFIED TYPE: ICD-10-CM

## 2025-04-04 DIAGNOSIS — E11.22 TYPE 2 DIABETES MELLITUS WITH STAGE 3A CHRONIC KIDNEY DISEASE, WITHOUT LONG-TERM CURRENT USE OF INSULIN (HCC): ICD-10-CM

## 2025-04-04 DIAGNOSIS — E66.01 MORBID (SEVERE) OBESITY DUE TO EXCESS CALORIES: ICD-10-CM

## 2025-04-04 DIAGNOSIS — D64.9 ANEMIA, UNSPECIFIED TYPE: Primary | ICD-10-CM

## 2025-04-04 DIAGNOSIS — N18.31 TYPE 2 DIABETES MELLITUS WITH STAGE 3A CHRONIC KIDNEY DISEASE, WITHOUT LONG-TERM CURRENT USE OF INSULIN (HCC): ICD-10-CM

## 2025-04-04 PROCEDURE — 99397 PER PM REEVAL EST PAT 65+ YR: CPT | Performed by: STUDENT IN AN ORGANIZED HEALTH CARE EDUCATION/TRAINING PROGRAM

## 2025-04-04 ASSESSMENT — PATIENT HEALTH QUESTIONNAIRE - PHQ9
SUM OF ALL RESPONSES TO PHQ QUESTIONS 1-9: 0
2. FEELING DOWN, DEPRESSED OR HOPELESS: NOT AT ALL
SUM OF ALL RESPONSES TO PHQ QUESTIONS 1-9: 0
1. LITTLE INTEREST OR PLEASURE IN DOING THINGS: NOT AT ALL

## 2025-04-04 NOTE — PROGRESS NOTES
[facility]  [facility] Family Medicine Residency   [facility] Sports Medicine      Chief Complaint:   Chief Complaint   Patient presents with    Follow-up     B/P follow up.       SUBJECTIVE:  Precious Taylor is a 70 y.o. female who presents for hypertension follow up and lab review. States not checking BP at home. States taking medications as directed but sometimes forgetting doses. Denies any complaints at this time.     PMHx:  Past Medical History:   Diagnosis Date    Asthma     Diabetes (HCC)     Hypertension        Meds:   Current Outpatient Medications   Medication Sig Dispense Refill    glimepiride (AMARYL) 4 MG tablet TAKE TWO TABLETS BY MOUTH EVERY MORNING 180 tablet 3    empagliflozin (JARDIANCE) 10 MG tablet Take 1 tablet by mouth daily 90 tablet 1    metoprolol tartrate (LOPRESSOR) 25 MG tablet TAKE ONE TABLET BY MOUTH TWICE A DAY 60 tablet 2    amLODIPine (NORVASC) 10 MG tablet Take 1 tablet by mouth daily 90 tablet 1    rosuvastatin (CRESTOR) 20 MG tablet Take 1 tablet by mouth daily 90 tablet 1    chlorthalidone (HYGROTON) 25 MG tablet Take 1 tablet by mouth daily 90 tablet 1    metFORMIN (GLUCOPHAGE-XR) 500 MG extended release tablet Take 1 tablet by mouth daily (with breakfast) 30 tablet 2    losartan (COZAAR) 100 MG tablet Take 1 tablet by mouth daily 90 tablet 3    albuterol sulfate HFA (PROVENTIL;VENTOLIN;PROAIR) 108 (90 Base) MCG/ACT inhaler INHALE TWO PUFFS BY MOUTH EVERY 4 HOURS AS NEEDED FOR SHORTNESS OF BREATH OR FOR WHEEZING 17 g 5    lidocaine (HM LIDOCAINE PATCH) 4 % external patch Place 1 patch onto the skin daily      acetaminophen (TYLENOL 8 HOUR ARTHRITIS PAIN) 650 MG extended release tablet Take 1 tablet by mouth every 8 hours as needed for Pain      fluticasone (FLONASE) 50 MCG/ACT nasal spray 2 sprays by Nasal route daily      fluticasone-salmeterol (ADVAIR) 100-50 MCG/ACT AEPB diskus inhaler Inhale 1 puff into the lungs in the morning and 1 puff in the

## 2025-04-04 NOTE — PROGRESS NOTES
Identified pt with two pt identifiers(name and ). Reviewed record in preparation for visit and have obtained necessary documentation.  Chief Complaint   Patient presents with    Follow-up     B/P follow up.        Vitals:    25 0933 25 0934   BP: (!) 161/65 (!) 152/68   BP Site: Right Upper Arm Left Upper Arm   Patient Position: Sitting Sitting   BP Cuff Size: Large Adult Large Adult   Pulse: 70    Resp: 18    Temp: 97.5 °F (36.4 °C)    TempSrc: Temporal    SpO2: 97%    Weight: 103.6 kg (228 lb 6.4 oz)    Height: 1.626 m (5' 4\")          Coordination of Care Questionnaire:  :     \"Have you been to the ER, urgent care clinic since your last visit?  Hospitalized since your last visit?\"    NO    “Have you seen or consulted any other health care providers outside of [facility] since your last visit?”    NO        “Have you had a colorectal cancer screening such as a colonoscopy/FIT/Cologuard?    NO    No colonoscopy on file  No cologuard on file  No FIT/FOBT on file   No flexible sigmoidoscopy on file         Click Here for Release of Records Request

## 2025-04-10 ENCOUNTER — LAB (OUTPATIENT)
Age: 70
End: 2025-04-10

## 2025-04-10 DIAGNOSIS — I10 HYPERTENSION, UNSPECIFIED TYPE: ICD-10-CM

## 2025-04-10 DIAGNOSIS — D64.9 ANEMIA, UNSPECIFIED TYPE: ICD-10-CM

## 2025-04-11 LAB
BASOPHILS # BLD: 0 K/UL (ref 0–0.1)
BASOPHILS NFR BLD: 0 % (ref 0–1)
DIFFERENTIAL METHOD BLD: ABNORMAL
EOSINOPHIL # BLD: 0.14 K/UL (ref 0–0.4)
EOSINOPHIL NFR BLD: 3 % (ref 0–7)
ERYTHROCYTE [DISTWIDTH] IN BLOOD BY AUTOMATED COUNT: 17.9 % (ref 11.5–14.5)
FERRITIN SERPL-MCNC: 6 NG/ML (ref 26–388)
HCT VFR BLD AUTO: 30.9 % (ref 35–47)
HGB BLD-MCNC: 8.3 G/DL (ref 11.5–16)
IMM GRANULOCYTES # BLD AUTO: 0 K/UL
IMM GRANULOCYTES NFR BLD AUTO: 0 %
IRON SATN MFR SERPL: 7 % (ref 20–50)
IRON SERPL-MCNC: 32 UG/DL (ref 35–150)
LYMPHOCYTES # BLD: 1.63 K/UL (ref 0.8–3.5)
LYMPHOCYTES NFR BLD: 34 % (ref 12–49)
MCH RBC QN AUTO: 20.8 PG (ref 26–34)
MCHC RBC AUTO-ENTMCNC: 26.9 G/DL (ref 30–36.5)
MCV RBC AUTO: 77.3 FL (ref 80–99)
MONOCYTES # BLD: 0.19 K/UL (ref 0–1)
MONOCYTES NFR BLD: 4 % (ref 5–13)
NEUTS SEG # BLD: 2.84 K/UL (ref 1.8–8)
NEUTS SEG NFR BLD: 59 % (ref 32–75)
NRBC # BLD: 0 K/UL (ref 0–0.01)
NRBC BLD-RTO: 0 PER 100 WBC
PLATELET # BLD AUTO: 344 K/UL (ref 150–400)
PMV BLD AUTO: 11.2 FL (ref 8.9–12.9)
RBC # BLD AUTO: 4 M/UL (ref 3.8–5.2)
RBC MORPH BLD: ABNORMAL
TIBC SERPL-MCNC: 478 UG/DL (ref 250–450)
WBC # BLD AUTO: 4.8 K/UL (ref 3.6–11)
WBC MORPH BLD: ABNORMAL

## 2025-04-14 ENCOUNTER — TELEPHONE (OUTPATIENT)
Age: 70
End: 2025-04-14

## 2025-04-14 LAB — ALDOST SERPL-MCNC: 2.9 NG/DL (ref 0–30)

## 2025-04-14 NOTE — TELEPHONE ENCOUNTER
Discussed results of most recent lab results. Hemoglobin 8.3, MCV 77.3, Ferritin 6, Iron 32, TIBC 478. Ferrous sulfate 325mg QD prescribed. Advised to follow up in 2-3 months for repeat labs to monitor response to treatment. All questions answered to the patient's satisfaction.

## 2025-04-16 DIAGNOSIS — I10 ESSENTIAL (PRIMARY) HYPERTENSION: ICD-10-CM

## 2025-04-18 RX ORDER — METOPROLOL TARTRATE 25 MG/1
25 TABLET, FILM COATED ORAL 2 TIMES DAILY
Qty: 60 TABLET | Refills: 2 | OUTPATIENT
Start: 2025-04-18

## 2025-04-18 RX ORDER — AMLODIPINE BESYLATE 10 MG/1
10 TABLET ORAL DAILY
Qty: 90 TABLET | Refills: 1 | Status: SHIPPED | OUTPATIENT
Start: 2025-04-18

## 2025-04-21 LAB
ALDOST SERPL-MCNC: 2.9 NG/DL (ref 0–30)
ALDOST/RENIN PLAS-RTO: 2.6 (ref 0–30)
RENIN PLAS-CCNC: 1.13 NG/ML/HR (ref 0.17–5.38)

## 2025-05-08 ENCOUNTER — TELEPHONE (OUTPATIENT)
Age: 70
End: 2025-05-08

## 2025-05-08 NOTE — TELEPHONE ENCOUNTER
Lobo Garces,    Patient is asking for a refill on metoprolol tartrate (LOPRESSOR) 25 MG tablet  I see that it was discontinued on 4/18/2025 but patient wasn't why. She stated she takes both metoprolol and amlodipine.    Please send to:    Pharmacy    [id number] - [place], [place] - [address] [place] [address] - P [phone] - F [phone]  [address] [place] [id number]  Phone: [phone]  Fax: [phone]

## 2025-05-09 DIAGNOSIS — I10 ESSENTIAL (PRIMARY) HYPERTENSION: ICD-10-CM

## 2025-05-09 RX ORDER — METOPROLOL TARTRATE 25 MG/1
25 TABLET, FILM COATED ORAL 2 TIMES DAILY
Qty: 90 TABLET | Refills: 1 | Status: SHIPPED | OUTPATIENT
Start: 2025-05-09

## 2025-05-16 ENCOUNTER — OFFICE VISIT (OUTPATIENT)
Age: 70
End: 2025-05-16
Payer: MEDICARE

## 2025-05-16 VITALS
HEART RATE: 70 BPM | OXYGEN SATURATION: 99 % | BODY MASS INDEX: 39.44 KG/M2 | WEIGHT: 231 LBS | SYSTOLIC BLOOD PRESSURE: 137 MMHG | HEIGHT: 64 IN | RESPIRATION RATE: 17 BRPM | DIASTOLIC BLOOD PRESSURE: 67 MMHG | TEMPERATURE: 97.6 F

## 2025-05-16 DIAGNOSIS — J06.9 VIRAL URI: ICD-10-CM

## 2025-05-16 DIAGNOSIS — J45.901 MILD ASTHMA EXACERBATION: Primary | ICD-10-CM

## 2025-05-16 DIAGNOSIS — J45.20 MILD INTERMITTENT ASTHMA WITHOUT COMPLICATION: ICD-10-CM

## 2025-05-16 PROCEDURE — 99213 OFFICE O/P EST LOW 20 MIN: CPT | Performed by: STUDENT IN AN ORGANIZED HEALTH CARE EDUCATION/TRAINING PROGRAM

## 2025-05-16 RX ORDER — BENZONATATE 100 MG/1
100 CAPSULE ORAL 3 TIMES DAILY PRN
Qty: 30 CAPSULE | Refills: 0 | Status: SHIPPED | OUTPATIENT
Start: 2025-05-16 | End: 2025-05-26

## 2025-05-16 RX ORDER — PREDNISONE 20 MG/1
20 TABLET ORAL DAILY
Qty: 5 TABLET | Refills: 0 | Status: SHIPPED | OUTPATIENT
Start: 2025-05-16 | End: 2025-05-21

## 2025-05-16 RX ORDER — ALBUTEROL SULFATE 90 UG/1
2 INHALANT RESPIRATORY (INHALATION) EVERY 4 HOURS PRN
Qty: 17 G | Refills: 5 | Status: SHIPPED | OUTPATIENT
Start: 2025-05-16 | End: 2025-08-14

## 2025-05-16 NOTE — PROGRESS NOTES
[facility]  [facility] Family Medicine Residency   [facility] Sports Medicine      Chief Complaint:   Chief Complaint   Patient presents with    Shortness of Breath    Cough     Times one week       SUBJECTIVE:  Precious Taylor is a 70 y.o. female who presents for productive cough with yellow sputum, body aches, shortness of breath, rhinorrhea, congestion x1 week. Denies fever. Denies known sick contacts. 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 States using albuterol inhaler with improvement of shortness of breath. Denies fever, chills, abdominal pain, nausea, vomiting, diarrhea. She received the flu vaccine 10/2024.     PMHx:  Past Medical History:   Diagnosis Date    Asthma     Diabetes (HCC)     Hypertension        Meds:   Current Outpatient Medications   Medication Sig Dispense Refill    metoprolol tartrate (LOPRESSOR) 25 MG tablet Take 1 tablet by mouth 2 times daily 90 tablet 1    amLODIPine (NORVASC) 10 MG tablet TAKE ONE TABLET BY MOUTH ONE TIME DAILY 90 tablet 1    ferrous sulfate (IRON 325) 325 (65 Fe) MG tablet Take 1 tablet by mouth daily (with breakfast) 90 tablet 0    glimepiride (AMARYL) 4 MG tablet TAKE TWO TABLETS BY MOUTH EVERY MORNING 180 tablet 3    empagliflozin (JARDIANCE) 10 MG tablet Take 1 tablet by mouth daily 90 tablet 1    rosuvastatin (CRESTOR) 20 MG tablet Take 1 tablet by mouth daily 90 tablet 1    chlorthalidone (HYGROTON) 25 MG tablet Take 1 tablet by mouth daily 90 tablet 1    fluticasone-salmeterol (ADVAIR) 100-50 MCG/ACT AEPB diskus inhaler Inhale 1 puff into the lungs in the morning and 1 puff in the evening. 60 each 2    metFORMIN (GLUCOPHAGE-XR) 500 MG extended release tablet Take 1 tablet by mouth daily (with breakfast) 30 tablet 2    losartan (COZAAR) 100 MG tablet Take 1 tablet by mouth daily 90 tablet 3    albuterol sulfate HFA (PROVENTIL;VENTOLIN;PROAIR) 108 (90 Base) MCG/ACT inhaler INHALE TWO PUFFS BY MOUTH EVERY 4 HOURS AS NEEDED FOR SHORTNESS OF

## 2025-05-16 NOTE — PROGRESS NOTES
Roomed by name and .    Chief Complaint   Patient presents with    Shortness of Breath    Cough     Times one week        Vitals:    25 1111   BP: 137/67   Pulse: 70   Resp: 17   Temp: 97.6 °F (36.4 °C)   TempSrc: Temporal   SpO2: 99%   Weight: 104.8 kg (231 lb)   Height: 1.626 m (5' 4\")          \"Have you been to the ER, urgent care clinic since your last visit?  Hospitalized since your last visit?\"    NO    “Have you seen or consulted any other health care providers outside of [facility] since your last visit?”    NO                          Click Here for Release of Records Request

## 2025-05-19 RX ORDER — ROSUVASTATIN CALCIUM 20 MG/1
20 TABLET, COATED ORAL DAILY
Qty: 90 TABLET | Refills: 1 | Status: SHIPPED | OUTPATIENT
Start: 2025-05-19

## 2025-05-23 ENCOUNTER — OFFICE VISIT (OUTPATIENT)
Age: 70
End: 2025-05-23
Payer: MEDICARE

## 2025-05-23 VITALS
RESPIRATION RATE: 16 BRPM | WEIGHT: 233.6 LBS | DIASTOLIC BLOOD PRESSURE: 73 MMHG | SYSTOLIC BLOOD PRESSURE: 149 MMHG | HEART RATE: 68 BPM | HEIGHT: 64 IN | OXYGEN SATURATION: 98 % | BODY MASS INDEX: 39.88 KG/M2 | TEMPERATURE: 97.8 F

## 2025-05-23 DIAGNOSIS — J06.9 VIRAL URI: Primary | ICD-10-CM

## 2025-05-23 DIAGNOSIS — I10 ESSENTIAL (PRIMARY) HYPERTENSION: ICD-10-CM

## 2025-05-23 PROCEDURE — 3077F SYST BP >= 140 MM HG: CPT | Performed by: STUDENT IN AN ORGANIZED HEALTH CARE EDUCATION/TRAINING PROGRAM

## 2025-05-23 PROCEDURE — 99213 OFFICE O/P EST LOW 20 MIN: CPT | Performed by: STUDENT IN AN ORGANIZED HEALTH CARE EDUCATION/TRAINING PROGRAM

## 2025-05-23 PROCEDURE — 1159F MED LIST DOCD IN RCRD: CPT | Performed by: STUDENT IN AN ORGANIZED HEALTH CARE EDUCATION/TRAINING PROGRAM

## 2025-05-23 PROCEDURE — 1123F ACP DISCUSS/DSCN MKR DOCD: CPT | Performed by: STUDENT IN AN ORGANIZED HEALTH CARE EDUCATION/TRAINING PROGRAM

## 2025-05-23 PROCEDURE — 1126F AMNT PAIN NOTED NONE PRSNT: CPT | Performed by: STUDENT IN AN ORGANIZED HEALTH CARE EDUCATION/TRAINING PROGRAM

## 2025-05-23 PROCEDURE — 3078F DIAST BP <80 MM HG: CPT | Performed by: STUDENT IN AN ORGANIZED HEALTH CARE EDUCATION/TRAINING PROGRAM

## 2025-05-23 ASSESSMENT — PATIENT HEALTH QUESTIONNAIRE - PHQ9
1. LITTLE INTEREST OR PLEASURE IN DOING THINGS: NOT AT ALL
SUM OF ALL RESPONSES TO PHQ QUESTIONS 1-9: 0
2. FEELING DOWN, DEPRESSED OR HOPELESS: NOT AT ALL
SUM OF ALL RESPONSES TO PHQ QUESTIONS 1-9: 0

## 2025-05-23 NOTE — PROGRESS NOTES
I discussed the findings, assessment and plan with the resident and agree with the resident's findings and plan as documented in the resident's note.      
Precious Taylor is a 70 y.o. female      Chief Complaint   Patient presents with    Shortness of Breath     1 week follow up   - SOB has improved  - still coughing       \"Have you been to the ER, urgent care clinic since your last visit?  Hospitalized since your last visit?\"    NO    “Have you seen or consulted any other health care providers outside of [facility] since your last visit?”    NO    “Have you had a colorectal cancer screening such as a colonoscopy/FIT/Cologuard?    NO    No colonoscopy on file  No cologuard on file  No FIT/FOBT on file   No flexible sigmoidoscopy on file        Have you had a mammogram?”   NO    Date of last Mammogram: 7/1/2020            Vitals:    05/23/25 0803 05/23/25 0819   BP: (!) 153/69 (!) 149/73   BP Site: Left Upper Arm Left Upper Arm   Patient Position: Sitting Sitting   BP Cuff Size: Large Adult Large Adult   Pulse: 68    Resp: 16    Temp: 97.8 °F (36.6 °C)    TempSrc: Temporal    SpO2: 98%    Weight: 106 kg (233 lb 9.6 oz)    Height: 1.626 m (5' 4\")             Health Maintenance Due   Topic Date Due    Colorectal Cancer Screen  Never done    Shingles vaccine (1 of 2) Never done    DEXA (modify frequency per FRAX score)  Never done    Respiratory Syncytial Virus (RSV) Pregnant or age 60 yrs+ (1 - Risk 60-74 years 1-dose series) Never done    Breast cancer screen  07/01/2022    Diabetic foot exam  04/20/2024    Diabetic retinal exam  05/04/2024    COVID-19 Vaccine (3 - 2024-25 season) 09/01/2024    Annual Wellness Visit (Medicare Advantage)  01/01/2025         Medication Reconciliation completed, changes noted.  Please  Update medication list.    
tablet Take 1 tablet by mouth every 8 hours as needed for Pain      fluticasone (FLONASE) 50 MCG/ACT nasal spray 2 sprays by Nasal route daily      fluticasone-salmeterol (ADVAIR) 100-50 MCG/ACT AEPB diskus inhaler Inhale 1 puff into the lungs in the morning and 1 puff in the evening. 60 each 2     Current Facility-Administered Medications   Medication Dose Route Frequency Provider Last Rate Last Admin    betamethasone acetate-betamethasone sodium phosphate (CELESTONE) injection 12 mg  12 mg IntraMUSCular Once Karis Do D, DO        lidocaine 1 % injection 3 mL  3 mL IntraDERmal Once Karis Do D, DO           Allergies:   Allergies   Allergen Reactions    Codeine Nausea And Vomiting    Tramadol Nausea And Vomiting       Smoker:  Social History     Tobacco Use   Smoking Status Never   Smokeless Tobacco Never       ETOH:   Social History     Substance and Sexual Activity   Alcohol Use No       FH:   Family History   Problem Relation Age of Onset    Diabetes Father     Diabetes Sister        ROS:  General/Constitutional:   No headache, fever, fatigue, weight loss or weight gain       Eyes:   No visual changes      Ears:    No changes      Neck:   No pain, or limited movement     Cardiac:    No chest pain      Respiratory:   Cough. No shortness of breath     GI:   No nausea/vomiting, diarrhea, abdominal pain       :   No dysuria or  hematuria    Neurological:   No problems with balance, or unilateral weakness     Skin: No rash     Physical Exam:  BP (!) 153/69 (BP Site: Left Upper Arm, Patient Position: Sitting, BP Cuff Size: Large Adult)   Pulse 68   Temp 97.8 °F (36.6 °C) (Temporal)   Resp 16   Ht 1.626 m (5' 4\")   Wt 106 kg (233 lb 9.6 oz)   SpO2 98%   BMI 40.10 kg/m²     Physical Exam  Constitutional:       General: She is not in acute distress.     Appearance: She is not ill-appearing.   HENT:      Head: Normocephalic.      Nose: Nose normal.      Mouth/Throat:      Mouth: Mucous membranes are

## 2025-07-14 RX ORDER — FERROUS SULFATE 325(65) MG
TABLET ORAL
Qty: 90 TABLET | Refills: 0 | Status: SHIPPED | OUTPATIENT
Start: 2025-07-14

## 2025-08-14 DIAGNOSIS — I10 ESSENTIAL (PRIMARY) HYPERTENSION: ICD-10-CM

## 2025-08-15 ENCOUNTER — OFFICE VISIT (OUTPATIENT)
Age: 70
End: 2025-08-15
Payer: MEDICARE

## 2025-08-15 VITALS
TEMPERATURE: 97.8 F | WEIGHT: 235.6 LBS | HEART RATE: 66 BPM | OXYGEN SATURATION: 99 % | DIASTOLIC BLOOD PRESSURE: 62 MMHG | SYSTOLIC BLOOD PRESSURE: 128 MMHG | BODY MASS INDEX: 40.44 KG/M2

## 2025-08-15 DIAGNOSIS — E11.22 TYPE 2 DIABETES MELLITUS WITH STAGE 3A CHRONIC KIDNEY DISEASE, WITHOUT LONG-TERM CURRENT USE OF INSULIN (HCC): ICD-10-CM

## 2025-08-15 DIAGNOSIS — D64.9 ANEMIA, UNSPECIFIED TYPE: ICD-10-CM

## 2025-08-15 DIAGNOSIS — I87.2 CHRONIC VENOUS INSUFFICIENCY: Primary | ICD-10-CM

## 2025-08-15 DIAGNOSIS — I10 ESSENTIAL (PRIMARY) HYPERTENSION: ICD-10-CM

## 2025-08-15 DIAGNOSIS — N18.31 TYPE 2 DIABETES MELLITUS WITH STAGE 3A CHRONIC KIDNEY DISEASE, WITHOUT LONG-TERM CURRENT USE OF INSULIN (HCC): ICD-10-CM

## 2025-08-15 PROCEDURE — 99214 OFFICE O/P EST MOD 30 MIN: CPT

## 2025-08-15 RX ORDER — FERROUS SULFATE 325(65) MG
325 TABLET ORAL 2 TIMES DAILY
Qty: 90 TABLET | Refills: 3 | Status: SHIPPED | OUTPATIENT
Start: 2025-08-15

## 2025-08-15 ASSESSMENT — PATIENT HEALTH QUESTIONNAIRE - PHQ9
1. LITTLE INTEREST OR PLEASURE IN DOING THINGS: NOT AT ALL
SUM OF ALL RESPONSES TO PHQ QUESTIONS 1-9: 0
2. FEELING DOWN, DEPRESSED OR HOPELESS: NOT AT ALL

## 2025-08-17 RX ORDER — METOPROLOL TARTRATE 25 MG/1
25 TABLET, FILM COATED ORAL 2 TIMES DAILY
Qty: 90 TABLET | Refills: 1 | Status: SHIPPED | OUTPATIENT
Start: 2025-08-17

## 2025-08-27 DIAGNOSIS — N18.31 TYPE 2 DIABETES MELLITUS WITH STAGE 3A CHRONIC KIDNEY DISEASE, WITHOUT LONG-TERM CURRENT USE OF INSULIN (HCC): ICD-10-CM

## 2025-08-27 DIAGNOSIS — E11.22 TYPE 2 DIABETES MELLITUS WITH STAGE 3A CHRONIC KIDNEY DISEASE, WITHOUT LONG-TERM CURRENT USE OF INSULIN (HCC): ICD-10-CM

## 2025-09-02 DIAGNOSIS — I10 ESSENTIAL (PRIMARY) HYPERTENSION: ICD-10-CM

## 2025-09-06 RX ORDER — LOSARTAN POTASSIUM 100 MG/1
100 TABLET ORAL DAILY
Qty: 90 TABLET | Refills: 3 | Status: SHIPPED | OUTPATIENT
Start: 2025-09-06